# Patient Record
Sex: FEMALE | Race: WHITE | Employment: OTHER | URBAN - METROPOLITAN AREA
[De-identification: names, ages, dates, MRNs, and addresses within clinical notes are randomized per-mention and may not be internally consistent; named-entity substitution may affect disease eponyms.]

---
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
## 2017-05-30 PROBLEM — M25.562 LEFT KNEE PAIN: Status: ACTIVE | Noted: 2017-05-30

## 2017-05-30 PROBLEM — R26.89 LOSS OF BALANCE: Status: ACTIVE | Noted: 2017-05-30

## 2017-05-30 PROBLEM — Z98.1 S/P LAMINECTOMY WITH SPINAL FUSION: Status: ACTIVE | Noted: 2017-05-30

## 2017-08-31 ENCOUNTER — HOSPITAL ENCOUNTER (OUTPATIENT)
Dept: PHYSICAL THERAPY | Age: 77
Discharge: HOME | End: 2017-08-31
Payer: MEDICARE

## 2017-08-31 PROCEDURE — G8984 CARRY CURRENT STATUS: HCPCS | Performed by: PHYSICAL THERAPIST

## 2017-08-31 PROCEDURE — 97110 THERAPEUTIC EXERCISES: CPT | Performed by: PHYSICAL THERAPIST

## 2017-08-31 PROCEDURE — G8985 CARRY GOAL STATUS: HCPCS | Performed by: PHYSICAL THERAPIST

## 2017-08-31 PROCEDURE — 97161 PT EVAL LOW COMPLEX 20 MIN: CPT | Performed by: PHYSICAL THERAPIST

## 2017-08-31 NOTE — PROGRESS NOTES
Danette Barnes [place]  : 1940   [address]. [facility] [address], [facility], [address]  [phone]   [phone]         OUTPATIENT PHYSICAL THERAPY:Initial Assessment 2017    ICD-10: Treatment Diagnosis: (M54.2) Cervicalgia; (M62.81) Muscle weakness; (Z98.1) Arthrodesis L-spine,   Precautions/Allergies:   Review of patient's allergies indicates no known allergies. Fall Risk Score: 1 (? 5 = High Risk)  MD Orders: Evaluate and Treat current cervicalgia and core strengthening s/p arthrodesis MEDICAL/REFERRING DIAGNOSIS:  Arthrodesis status [Z98.1]  Cervicalgia [M54.2]   DATE OF ONSET: 2016  REFERRING PHYSICIAN: Deidre العلي MD  RETURN PHYSICIAN APPOINTMENT: None Scheduled     INITIAL ASSESSMENT:  Ms. Gustavo Fatima presents today with neck pain that she's had off and on for several years due to poor posture from chest/pec tightening s/p mastectomy and reconstruction, as well as, clavicular fracture and generalized OA/DDD. She has difficulty reaching/lifting overhead due to decreased cervical/thoracic mobility, neck pain and poor posture. Pt is anxious to begin therapy and learn exercises to improve her posture and decrease her pain. Pt reports she no longer has LBP since her surgery in , however, she would like to learn exercises that will help improve her strength and safety with performing house hold and yard work activities. Pt will benefit from skilled PT to address the following deficits. PROBLEM LIST (Impacting functional limitations):  1. Decreased Strength  2. Increased Pain  3. Decreased Pacing Skills  4. Decreased Flexibility/Joint Mobility  5. Decreased Knowledge of Precautions  6. Decreased [place] with Home Exercise Program INTERVENTIONS PLANNED:  1. Balance Exercise  2. Bed Mobility  3. Cold  4. Cryotherapy  5. Electrical Stimulation  6. Heat  7. Home Exercise Program (HEP)  8.  Manual Therapy  9. Neuromuscular Re-education/Strengthening  10. Range of Motion (ROM)  11. Therapeutic Exercise/Strengthening  12. Transcutaneous Electrical Nerve Stimulation (TENS)  13. Ultrasound (US)  14. Kinesiotaping   TREATMENT PLAN:  Effective Dates: 8/31/17 TO 11/23/17. Frequency/Duration: 2 times a week for 12 weeks  GOALS: (Goals have been discussed and agreed upon with patient.)  Short-Term Functional Goals: Time Frame: 4-6 weeks (10/12/17)  1. Pt will be compliant and independent with HEP and demonstrating correct, erect posture and log rolling without an increase in pain. 2. Pt will improve NDI score to 3/50 to increase pt's overall functional mobility. 3. Pt will improve B shoulder flex/scapt/ABD AROM to 145deg with pain 3-4/10 @ worst to increase pt's ease with reaching/lifting overhead. 4. Pt to subjectively report a decrease in pain to 3-4/10 @ worst to increase pt's ease with driving. 5. Pt will improve Cervical AROM to Flex 75%, B SB; 25%; B ROT 25% and Ext: 50% with pain 3-4/10 @ worst to increase pt's ease and safety with driving. 6. Pt will subjectively report less pain and greater ease with driving, washing dishes, doing yard work and taking care of her animals. 7. Pt will improve cervical strength to 3/5 throughout to aide with pt having correct erect posture with less pain. 8. Pt will improve core and hip strength to > 3/5 throughout to prevent LBP and improve balance. Discharge Goals: Time Frame: 8-12 weeks (11/23/17)  1. Pt will improve NDI score to 2/50 to increase pt's overall functional mobility. 2. Pt will improve FLR from  CI to G 8985 CI to increase pt's overall functional mobility. 3. Pt will improve cervical AROM to Flex 80%, B SB 50%, B ROT 50% and Ext 75% with manageable pain to increase pt's ease with driving and yard work. 9. Pt will improve B shoulder flex/scapt/ABD AROM to 155deg with manageable pain to allow pt greater ease with [place] activities.    4. Pt will subjectively report a decrease in pain to 2-3/10 @ worst to allow pt greater ease with reaching overhead. 5. Pt will no longer need to wrap a towel around her neck for support while performing yard work, cooking, cleaning and washing dishes. 6. Pt will improve core and hip strength to 3+/5 throughout to increase pt's stability, balance and ease of performing yard work and caring for her animals. 7. Pt will improve cervical strength to 3+/5 throughout to further decrease pt's pain while improving posture. 8. Pt will be discharged from PT to [facility]. Rehabilitation Potential For Stated Goals: Good  Regarding Katiana Michael Ponce's therapy, I certify that the treatment plan above will be carried out by a therapist or under their direction. Thank you for this referral,  Adiel Henriquez PT     Referring Physician Signature: Juni James MD              Date                    The information in this section was collected on 8/31/17 (except where otherwise noted). HISTORY:   History of Present Injury/Illness (Reason for Referral):  Pt reports she's had neck and back pain for years, and she had a lumbar laminectomy /arthrodesis July 2016. She states she's trying to avoid neck surgery. She reports her neck pain/pressure worsens with all of her house/yard work, ADLs, driving, and caring for her animals. She denies taking pain meds and has not had an ANA. She states she uses a towel tied very tight around her neck to aide with unloading and supporting it. She denies having had PT after her lumbar surgery, and would like to learn exercises that will help improve her posture, neck pain and aide with preventing further injury to her back. Past Medical History/Comorbidities:   Ms. Daryle Coins  has a past medical history of Arrhythmia; Blindness of right eye; Cancer ([facility] [address] (1978); Cardiac murmur; Disc degeneration, lumbar; Fibrocystic disease of breast; GERD (gastroesophageal reflux disease); Hyperlipidemia;  Hypertension; Imbalance; Osteoarthritis; Osteoporosis; Sciatica; Spinal stenosis; Squamous cell carcinoma of scalp (2014); TIA (transient ischemic attack) (2008); and Vertigo. She also has no past medical history of Adverse effect of anesthesia; Difficult intubation; Malignant hyperthermia due to anesthesia; Nausea & vomiting; or Pseudocholinesterase deficiency. Ms. Eric Kimble  has a past surgical history that includes hysterectomy (1969); other surgical (2015); knee replacement (Left, 2007); orthopaedic (Right, 2008); knee replacement (Right, 2009); orthopaedic (2016); mastectomy (Bilateral, 1978); and cataract removal (Bilateral, 2009). Social History/Living Environment:     Pt reports she lives alone in a mobile home on .[place] acres. She states she does her own house/yard work and cares for her animals, but has neck pain. She states difficulty driving and reaching/lifting overhead due to neck and upper back pain. Prior Level of Function/Work/Activity:  Pt states she's completely independent with all activities and wants to keep it that way, but does have pain. Dominant Side:         LEFT    Current Medications:       Current Outpatient Prescriptions:     quinapril (ACCUPRIL) 10 mg tablet, TAKE 1 TAB BY MOUTH EVERY MORNING. INDICATIONS: HYPERTENSION, Disp: 90 Tab, Rfl: 3    metoprolol tartrate (LOPRESSOR) 50 mg tablet, Take 1 Tab by mouth two (2) times a day., Disp: 180 Tab, Rfl: 3    lovastatin (MEVACOR) 20 mg tablet, Take 20 mg by mouth nightly., Disp: , Rfl:     aspirin (ASPIRIN) 325 mg tablet, Take 325 mg by mouth every morning. Stopped 7/8/16, Disp: , Rfl:    Date Last Reviewed:  8/31/17    Number of Personal Factors/Comorbidities that affect the Plan of Care:  0: LOW COMPLEXITY   EXAMINATION:   Observation/Orthostatic Postural Assessment:          Pt sits/stands with very forward head and rounded shoulders and posterior pelvic tilt.    Palpation:          Pt with increased tightness/tenderness noted in B cervical paraspinals, B UT, B levator scap, B anterior scalenes, B SCM and B pec major/minor contributing to her thoracic and cervical pain and lack of mobility. Trigger points: + in B UT (post metcalf), B levator scap  Flexibility: moderate to severe tightness noted in B cervical paraspinals, B UT, B levator scap, B anterior scalenes, B SCM and B pec major/minor. Cervical AROM: % of normal motion in standing  Cervical spine Date:  8/31/17 Date:   Date:     Direction  Parameters Parameters Parameters   Flexion  50% w/ pain     Extension  25% w/ pain     Rotation  R: 10% w/ pain  L: 20% w/ pain     Side bending  R: 10% w/ pain  L:20% w/ pain     Shoulder Flex/Scapt R:135deg  L:135deg     Shoulder ABD R:135deg  L:135deg     Shoulder ER/IR R:WNL B  L:WNL B         Strength Date:  8/31/17 Date:   Date:     Cervical/Shoulder Parameters Parameters Parameters   Cervical 2+ to 3-/5 due to poor posture     Shoulder Flex/Scapt R:3+/5  L:3+/5     Shoulder ABD R:3/5  L:3/5        Myotomes: Normal and equal B throughout  Diaphragm (C4):  Deltoid/Biceps (C5):  Wrist Extensors (C6):  Triceps (C7):  Flexor Profundus (C8):    Sensation: Normal and equal B throughout  Biceps (C5):   Carbone Radial (C6-C7):  Carbone Ulner (C8-T1):    Special Test/Function:  Compression:+ w/ pain at C5-C6  Distraction:+ w/ decreased pain/heaviness throughout her c-spine  Spurling's maneuver:-B  Accessory mobility:restricted throughout, especially C4-C7      Body Structures Involved:  1. Nerves  2. Bones  3. Joints  4. Muscles  5. Ligaments Body Functions Affected:  1. Sensory/Pain  2. Neuromusculoskeletal  3. Movement Related  Activities and Participation Affected:  1. Mobility  2. Self Care  3. Domestic Life  4.  Community, Social and [place]    Number of elements (examined above) that affect the Plan of Care:  1-2: LOW COMPLEXITY   CLINICAL PRESENTATION:    Presentation:  Stable and uncomplicated: LOW COMPLEXITY   CLINICAL DECISION MAKING:   Outcome Measure: Tool Used: Neck Disability Index (NDI)  Score:  Initial: 4/50 =8% disability Most Recent: X/50 (Date: -- )   Interpretation of Score: The Neck Disability Index is a revised form of the Oswestry Low Back Pain Index and is designed to measure the activities of daily living in person's with neck pain. Each section is scored on a 0-5 scale, 5 representing the greatest disability. The scores of each section are added together for a total score of 50. Score 0 1-10 11-20 21-30 31-40 41-49 50   Modifier CH CI CJ CK CL CM CN     ? Carrying, Moving, and Handling Objects:     - CURRENT STATUS: CI - 1%-19% impaired, limited or restricted    - GOAL STATUS: CI - 1%-19% impaired, limited or restricted    - D/C STATUS:  ---------------To be determined---------------       Medical Necessity:   · Patient is expected to demonstrate progress in strength, range of motion, balance, coordination and functional technique to improve safety during driving and decrease pain, as well as, increase ease with lifting/reaching overhead to increase pt's ease with house work, yard work and caring for her animals. .  Reason for Services/Other Comments:  · Patient reports she lives alone in a bad neighborhood. She reports she's completely independent with all of her ADLS, house hold and work activities. She states she has pain and difficulty completing them. She reports she currently ties a towel around her neck for support, and it helps, however she appears weak to her rough neighbors. She states she needs assistance in \"getting rid of\" her neck pain, as well as, education on how to get stronger and improve her posture so that she doesn't appear vulnerable.  .     Use of outcome tool(s) and clinical judgement create a POC that gives a: Clear prediction of patient's progress: LOW COMPLEXITY            TREATMENT:   (In addition to Assessment/Re-Assessment sessions the following treatments were rendered)  Pre-treatment Symptoms/Complaints:  Pt c/o neck pain/heaviness. Pain: Initial:     5-6/10 Post Session:  4-5/10 after traction/exercises     THERAPEUTIC EXERCISE: (30minutes minutes):  Exercises per grid below to improve mobility, strength, balance and coordination. Required moderate visual, verbal and tactile cues to promote proper body alignment, promote proper body posture, promote proper body mechanics and promote proper body breathing techniques. Progressed resistance, range, repetitions and complexity of movement as indicated. Re-assessment was performed today (see above assessment section). Separate time was dedicated today for this re-assessment. 30minutes   Date:  8/31/17 Date:   Date:     Activity/Exercise Parameters Parameters Parameters   Shoulder shrugs 10x     Posterior shoulder rolls 10x     Scapular retraction 10x     Chin tucks (sitting/supine) 10 x 5sec     Cervical rotation to the left 5x     Cervical flexion 5x     Seated UT stretch 2 x 20sec     Stdg drwy pec stretch 1 x 20sec     LTR 10x     TAs w/ pelvic tilt 10x 5sec     Cervical Traction/SOC release 5\"     Education HEP, scapular retraction/depression/TAs       Treatment/Session Assessment:  See above  · Response to Treatment:  Pt did well with above exercises, therefore Iadded those to her HEP. She subjectively reported less pain and minimally improved movement after manual cervical traction and SOC release  · Compliance with Program/Exercises: Will assess as treatment progresses. Recommendations/Intent for next treatment session: \"Next visit will focus on advancements to more challenging activities, reduction in assistance provided and manual therapy to address soft tissue, as well as, joint restrictions   \". Future Appointments  Date Time Provider Sunny Burden   9/11/2017 2:00 PM Chucho Acosta PT [facility]   9/13/2017 2:00 PM KEAGAN Tompkins [facility]   9/18/2017 2:00 PM Chucho Acosta PT [facility] 9/21/2017 2:00 PM Ruthine Shires, PT [facility]   9/26/2017 2:00 PM Ruthine Shires, PT [facility]   9/28/2017 2:00 PM Ruthine Shires, PT [facility]   10/4/2017 2:00 PM Ruthine Shires, PT [facility]   10/5/2017 10:45 AM Effie Villegas [facility]   10/9/2017 2:00 PM Ruthine Shires, PT [facility]   10/11/2017 2:00 PM Ruthine Shires, PT [facility]   10/18/2017 2:00 PM Ruthine Shires, PT [facility]   10/23/2017 2:00 PM Ruthine Shires, PT [facility]   10/25/2017 2:00 PM Ruthine Shires, PT [facility]     Total Treatment Duration:  PT Patient Time In/Time Out  Time In: 1325  Time Out: 1435    Ruthine Shires, PT

## 2017-08-31 NOTE — PROGRESS NOTES
Ambulatory/Rehab Services H2 Model Falls Risk Assessment    Risk Factor Pts. ·   Confusion/Disorientation/Impulsivity  []    4 ·   Symptomatic Depression  []   2 ·   Altered Elimination  []   1 ·   Dizziness/Vertigo  [x]   1 ·   Gender (Male)  []   1 ·   Any administered antiepileptics (anticonvulsants):  []   2 ·   Any administered benzodiazepines:  []   1 ·   Visual Impairment (specify):  []   1 ·   Portable Oxygen Use  []   1 ·   Orthostatic ? BP  []   1 ·   History of Recent Falls (within 3 mos.)  []   5     Ability to Rise from Chair (choose one) Pts. ·   Ability to rise in a single movement  [x]   0 ·   Pushes up, successful in one attempt  []   1 ·   Multiple attempts, but successful  []   3 ·   Unable to rise without assistance  []   4   Total: (5 or greater = High Risk) 1     Falls Prevention Plan:   []                Physical Limitations to Exercise (specify):   []                Mobility Assistance Device (type):   []                Exercise/Equipment Adaptation (specify):    ©2010 [facility] Patent #[id number].  Federal Law prohibits the replication, distribution or use without written permission from [facility] [employer]

## 2017-09-11 ENCOUNTER — APPOINTMENT (OUTPATIENT)
Dept: PHYSICAL THERAPY | Age: 77
End: 2017-09-11
Payer: MEDICARE

## 2017-09-13 ENCOUNTER — HOSPITAL ENCOUNTER (OUTPATIENT)
Dept: PHYSICAL THERAPY | Age: 77
Discharge: HOME | End: 2017-09-13
Payer: MEDICARE

## 2017-09-13 PROCEDURE — 97110 THERAPEUTIC EXERCISES: CPT | Performed by: PHYSICAL THERAPIST

## 2017-09-13 PROCEDURE — 97035 APP MDLTY 1+ULTRASOUND EA 15: CPT | Performed by: PHYSICAL THERAPIST

## 2017-09-13 PROCEDURE — 97140 MANUAL THERAPY 1/> REGIONS: CPT | Performed by: PHYSICAL THERAPIST

## 2017-09-13 NOTE — PROGRESS NOTES
Hilario Oxford Ponce  : 1940   [address]. [address], [place], [address] [address]  [phone]   [phone]         OUTPATIENT PHYSICAL THERAPY:Daily Note 2017    ICD-10: Treatment Diagnosis: (M54.2) Cervicalgia; (M62.81) Muscle weakness; (Z98.1) Arthrodesis L-spine,   Precautions/Allergies:   Review of patient's allergies indicates no known allergies. Fall Risk Score: 1 (? 5 = High Risk)  MD Orders: Evaluate and Treat current cervicalgia and core strengthening s/p arthrodesis MEDICAL/REFERRING DIAGNOSIS:  Arthrodesis status [Z98.1]  Cervicalgia [M54.2]   DATE OF ONSET: 2016  REFERRING PHYSICIAN: Fabian Ballard MD  RETURN PHYSICIAN APPOINTMENT: None Scheduled     INITIAL ASSESSMENT:  Ms. Janis Luther presents today with neck pain that she's had off and on for several years due to poor posture from chest/pec tightening s/p mastectomy and reconstruction, as well as, clavicular fracture and generalized OA/DDD. She has difficulty reaching/lifting overhead due to decreased cervical/thoracic mobility, neck pain and poor posture. Pt is anxious to begin therapy and learn exercises to improve her posture and decrease her pain. Pt reports she no longer has LBP since her surgery in , however, she would like to learn exercises that will help improve her strength and safety with performing house hold and yard work activities. Pt will benefit from skilled PT to address the following deficits. PROBLEM LIST (Impacting functional limitations):  1. Decreased Strength  2. Increased Pain  3. Decreased Pacing Skills  4. Decreased Flexibility/Joint Mobility  5. Decreased Knowledge of Precautions  6. Decreased Morris with Home Exercise Program INTERVENTIONS PLANNED:  1. Balance Exercise  2. Bed Mobility  3. Cold  4. Cryotherapy  5. Electrical Stimulation  6. Heat  7. Home Exercise Program (HEP)  8. Manual Therapy  9.  Neuromuscular Re-education/Strengthening  10. Range of Motion (ROM)  11. Therapeutic Exercise/Strengthening  12. Transcutaneous Electrical Nerve Stimulation (TENS)  13. Ultrasound (US)  14. Kinesiotaping   TREATMENT PLAN:  Effective Dates: 8/31/17 TO 11/23/17. Frequency/Duration: 2 times a week for 12 weeks  GOALS: (Goals have been discussed and agreed upon with patient.)  Short-Term Functional Goals: Time Frame: 4-6 weeks (10/12/17)  1. Pt will be compliant and independent with HEP and demonstrating correct, erect posture and log rolling without an increase in pain. 2. Pt will improve NDI score to 3/50 to increase pt's overall functional mobility. 3. Pt will improve B shoulder flex/scapt/ABD AROM to 145deg with pain 3-4/10 @ worst to increase pt's ease with reaching/lifting overhead. 4. Pt to subjectively report a decrease in pain to 3-4/10 @ worst to increase pt's ease with driving. 5. Pt will improve Cervical AROM to Flex 75%, B SB; 25%; B ROT 25% and Ext: 50% with pain 3-4/10 @ worst to increase pt's ease and safety with driving. 6. Pt will subjectively report less pain and greater ease with driving, washing dishes, doing yard work and taking care of her animals. 7. Pt will improve cervical strength to 3/5 throughout to aide with pt having correct erect posture with less pain. 8. Pt will improve core and hip strength to > 3/5 throughout to prevent LBP and improve balance. Discharge Goals: Time Frame: 8-12 weeks (11/23/17)  1. Pt will improve NDI score to 2/50 to increase pt's overall functional mobility. 2. Pt will improve FLR from  CI to G 8985 CI to increase pt's overall functional mobility. 3. Pt will improve cervical AROM to Flex 80%, B SB 50%, B ROT 50% and Ext 75% with manageable pain to increase pt's ease with driving and yard work. 9. Pt will improve B shoulder flex/scapt/ABD AROM to 155deg with manageable pain to allow pt greater ease with [place] activities.    4. Pt will subjectively report a decrease in pain to 2-3/10 @ worst to allow pt greater ease with reaching overhead. 5. Pt will no longer need to wrap a towel around her neck for support while performing yard work, cooking, cleaning and washing dishes. 6. Pt will improve core and hip strength to 3+/5 throughout to increase pt's stability, balance and ease of performing yard work and caring for her animals. 7. Pt will improve cervical strength to 3+/5 throughout to further decrease pt's pain while improving posture. 8. Pt will be discharged from PT to [facility]. Rehabilitation Potential For Stated Goals: Good  Regarding [facility] [place]'s therapy, I certify that the treatment plan above will be carried out by a therapist or under their direction. Thank you for this referral,  Temo Monaco PT     Referring Physician Signature: Deidre العلي MD              Date                    The information in this section was collected on 8/31/17 (except where otherwise noted). HISTORY:   History of Present Injury/Illness (Reason for Referral):  Pt reports she's had neck and back pain for years, and she had a lumbar laminectomy /arthrodesis July 2016. She states she's trying to avoid neck surgery. She reports her neck pain/pressure worsens with all of her house/yard work, ADLs, driving, and caring for her animals. She denies taking pain meds and has not had an ANA. She states she uses a towel tied very tight around her neck to aide with unloading and supporting it. She denies having had PT after her lumbar surgery, and would like to learn exercises that will help improve her posture, neck pain and aide with preventing further injury to her back. Past Medical History/Comorbidities:   Ms. Gustavo Fatima  has a past medical history of Arrhythmia; Blindness of right eye; Cancer ([facility] [address] (1978); Cardiac murmur; Disc degeneration, lumbar; Fibrocystic disease of breast; GERD (gastroesophageal reflux disease); Hyperlipidemia;  Hypertension; Imbalance; Osteoarthritis; Osteoporosis; Sciatica; Spinal stenosis; Squamous cell carcinoma of scalp (2014); TIA (transient ischemic attack) (2008); and Vertigo. She also has no past medical history of Adverse effect of anesthesia; Difficult intubation; Malignant hyperthermia due to anesthesia; Nausea & vomiting; or Pseudocholinesterase deficiency. Ms. Charla Colby  has a past surgical history that includes hysterectomy (1969); other surgical (2015); knee replacement (Left, 2007); orthopaedic (Right, 2008); knee replacement (Right, 2009); orthopaedic (2016); mastectomy (Bilateral, 1978); and cataract removal (Bilateral, 2009). Social History/Living Environment:     Pt reports she lives alone in a mobile home on .[place] acres. She states she does her own house/yard work and cares for her animals, but has neck pain. She states difficulty driving and reaching/lifting overhead due to neck and upper back pain. Prior Level of Function/Work/Activity:  Pt states she's completely independent with all activities and wants to keep it that way, but does have pain. Dominant Side:         LEFT    Current Medications:       Current Outpatient Prescriptions:     quinapril (ACCUPRIL) 10 mg tablet, TAKE 1 TAB BY MOUTH EVERY MORNING. INDICATIONS: HYPERTENSION, Disp: 90 Tab, Rfl: 3    metoprolol tartrate (LOPRESSOR) 50 mg tablet, Take 1 Tab by mouth two (2) times a day., Disp: 180 Tab, Rfl: 3    lovastatin (MEVACOR) 20 mg tablet, Take 20 mg by mouth nightly., Disp: , Rfl:     aspirin (ASPIRIN) 325 mg tablet, Take 325 mg by mouth every morning. Stopped 7/8/16, Disp: , Rfl:    Date Last Reviewed:  8/31/17   EXAMINATION:   Observation/Orthostatic Postural Assessment:          Pt sits/stands with very forward head and rounded shoulders and posterior pelvic tilt.    Palpation:          Pt with increased tightness/tenderness noted in B cervical paraspinals, B UT, B levator scap, B anterior scalenes, B SCM and B pec major/minor contributing to her thoracic and cervical pain and lack of mobility. Trigger points: + in B UT (post metcalf), B levator scap  Flexibility: moderate to severe tightness noted in B cervical paraspinals, B UT, B levator scap, B anterior scalenes, B SCM and B pec major/minor. Cervical AROM: % of normal motion in standing  Cervical spine Date:  8/31/17 Date:   Date:     Direction  Parameters Parameters Parameters   Flexion  50% w/ pain     Extension  25% w/ pain     Rotation  R: 10% w/ pain  L: 20% w/ pain     Side bending  R: 10% w/ pain  L:20% w/ pain     Shoulder Flex/Scapt R:135deg  L:135deg     Shoulder ABD R:135deg  L:135deg     Shoulder ER/IR R:WNL B  L:WNL B         Strength Date:  8/31/17 Date:   Date:     Cervical/Shoulder Parameters Parameters Parameters   Cervical 2+ to 3-/5 due to poor posture     Shoulder Flex/Scapt R:3+/5  L:3+/5     Shoulder ABD R:3/5  L:3/5        Myotomes: Normal and equal B throughout  Diaphragm (C4):  Deltoid/Biceps (C5):  Wrist Extensors (C6):  Triceps (C7):  Flexor Profundus (C8):    Sensation: Normal and equal B throughout  Biceps (C5):   Carbone Radial (C6-C7):  Carbone Ulner (C8-T1):    Special Test/Function:  Compression:+ w/ pain at C5-C6  Distraction:+ w/ decreased pain/heaviness throughout her c-spine  Spurling's maneuver:-B  Accessory mobility:restricted throughout, especially C4-C7     CLINICAL PRESENTATION:   CLINICAL DECISION MAKING:   Outcome Measure: Tool Used: Neck Disability Index (NDI)  Score:  Initial: 4/50 =8% disability Most Recent: X/50 (Date: -- )   Interpretation of Score: The Neck Disability Index is a revised form of the Oswestry Low Back Pain Index and is designed to measure the activities of daily living in person's with neck pain. Each section is scored on a 0-5 scale, 5 representing the greatest disability. The scores of each section are added together for a total score of 50. Score 0 1-10 11-20 21-30 31-40 41-49 50   Modifier CH CI CJ CK CL CM CN     ?  Carrying, Moving, and Handling Objects:     - CURRENT STATUS: CI - 1%-19% impaired, limited or restricted    - GOAL STATUS: CI - 1%-19% impaired, limited or restricted    - D/C STATUS:  ---------------To be determined---------------       Medical Necessity:   · Patient is expected to demonstrate progress in strength, range of motion, balance, coordination and functional technique to improve safety during driving and decrease pain, as well as, increase ease with lifting/reaching overhead to increase pt's ease with house work, yard work and caring for her animals. .  Reason for Services/Other Comments:  · Patient reports she lives alone in a bad neighborhood. She reports she's completely independent with all of her ADLS, house hold and work activities. She states she has pain and difficulty completing them. She reports she currently ties a towel around her neck for support, and it helps, however she appears weak to her rough neighbors. She states she needs assistance in \"getting rid of\" her neck pain, as well as, education on how to get stronger and improve her posture so that she doesn't appear vulnerable. .            TREATMENT:   (In addition to Assessment/Re-Assessment sessions the following treatments were rendered)  Pre-treatment Symptoms/Complaints:  Pt reports she still has neck pain and stiffness, but states she's had less incidence of headaches since beginning her exercises. Pain: Initial:     5/10 Post Session:  3/10 after traction/exercises     US: (10minutes): @ 1MHz, 100% . 9w/cm2 to B cervical paraspinals, B UT and B levator scap to aide with decreasing pain and tightness, followed by    MANUAL THERAPY: (30minutes): Consisting of STM/MFR to B cervical paraspinals, B UT, B levator scap and B anterior scalenes to aide with improving tissue mobility while decreasing pain. Pt also received manual cervical traction, as well as, SOC release to aide with improving joint mobility.      THERAPEUTIC EXERCISE: (20 minutes minutes):  Exercises per grid below to improve mobility, strength, balance and coordination. Required moderate visual, verbal and tactile cues to promote proper body alignment, promote proper body posture, promote proper body mechanics and promote proper body breathing techniques. Progressed resistance, range, repetitions and complexity of movement as indicated. Date:  8/31/17 Date:  9/13/17 Date:     Activity/Exercise Parameters Parameters Parameters   Shoulder shrugs 10x     Posterior shoulder rolls 10x 10x    Scapular retraction 10x 10x    Chin tucks (sitting/supine) 10 x 5sec 20 x 5 sec    Cervical rotation to the left 5x     Cervical flexion 5x     Seated UT stretch 2 x 20sec     Stdg drwy pec stretch 1 x 20sec 2 x 20sec    LTR 10x 20x    TAs w/ pelvic tilt 10x 5sec 20 x 5sec    Bridging w/ TAs, pelvic tilt and add sq  20x    Supine hip ABD w/ tband  OTB, 20x          STM/MFR to C-spine/UT  done    Cervical Traction/SOC release 5\" done    Education HEP, scapular retraction/depression/TAs TAs/glute sq w/ all standing/lifting; proper lifting techniques      Treatment/Session Assessment:  Pt with improved cervical AROM after manual therapy. Increased tightness, tissue density and positive trigger points noted in R UT and levator scap. Pt encouraged to stretch pecs daily as I believe pec tigthness is contributing to her forward head and shoulders which is increasing her pain. · Response to Treatment:  Pt with less cervical pain and her low grade headache subsided with manual cerivcal traction and SOC. · Compliance with Program/Exercises: Pt reports compliance with HEP 2-3x daily as long as she hasn't done a lot of house work, otherwise 1x daily. Recommendations/Intent for next treatment session: \"Continue with PT to improve cervical and B UE AROM and strength, as well as, core strength to decrease cervical and lumbar pain.  Manual techniques and modalities should be utilized if pt continues to respond well to them. Progress mid back and abdominal strengthening as pt is able. \".   Future Appointments  Date Time Provider Sunny Burden   9/18/2017 2:00 PM Francesco Martinez, PT [facility]   9/21/2017 2:00 PM Francesco Martinez, PT [facility]   9/26/2017 2:00 PM Francesco Martinez, PT [facility]   9/28/2017 2:00 PM Francesco Martinez, PT [facility]   10/4/2017 2:00 PM Francesco Martinez, PT [facility]   10/5/2017 10:45 AM Ronda Lehman MD [facility]   10/9/2017 2:00 PM Francesco Martinez, PT [facility]   10/11/2017 2:00 PM Francesco Martinez, PT [facility]   10/18/2017 2:00 PM Francesco Martinez, PT [facility]   10/23/2017 2:00 PM Francesco Martinez, PT [facility]   10/25/2017 2:00 PM Francesco Martinez, PT [facility]     Total Treatment Duration:  PT Patient Time In/Time Out  Time In: 1400  Time Out: Shazia [address], PT

## 2017-09-18 ENCOUNTER — HOSPITAL ENCOUNTER (OUTPATIENT)
Dept: PHYSICAL THERAPY | Age: 77
Discharge: HOME | End: 2017-09-18
Payer: MEDICARE

## 2017-09-18 PROCEDURE — 97140 MANUAL THERAPY 1/> REGIONS: CPT | Performed by: PHYSICAL THERAPIST

## 2017-09-18 PROCEDURE — 97110 THERAPEUTIC EXERCISES: CPT | Performed by: PHYSICAL THERAPIST

## 2017-09-18 PROCEDURE — 97035 APP MDLTY 1+ULTRASOUND EA 15: CPT | Performed by: PHYSICAL THERAPIST

## 2017-09-18 NOTE — PROGRESS NOTES
[place]  : 1940   Go[facility] [address] at [facility]. [address], [place], [address] [address]  [phone]   [phone]         OUTPATIENT PHYSICAL THERAPY:Daily Note 2017    ICD-10: Treatment Diagnosis: (M54.2) Cervicalgia; (M62.81) Muscle weakness; (Z98.1) Arthrodesis L-spine,   Precautions/Allergies:   Review of patient's allergies indicates no known allergies. Fall Risk Score: 1 (? 5 = High Risk)  MD Orders: Evaluate and Treat current cervicalgia and core strengthening s/p arthrodesis MEDICAL/REFERRING DIAGNOSIS:  Arthrodesis status [Z98.1]  Cervicalgia [M54.2]   DATE OF ONSET: 2016  REFERRING PHYSICIAN: Eleonora Garcia MD  RETURN PHYSICIAN APPOINTMENT: None Scheduled     INITIAL ASSESSMENT:  Ms. Talita Cotto presents today with neck pain that she's had off and on for several years due to poor posture from chest/pec tightening s/p mastectomy and reconstruction, as well as, clavicular fracture and generalized OA/DDD. She has difficulty reaching/lifting overhead due to decreased cervical/thoracic mobility, neck pain and poor posture. Pt is anxious to begin therapy and learn exercises to improve her posture and decrease her pain. Pt reports she no longer has LBP since her surgery in , however, she would like to learn exercises that will help improve her strength and safety with performing house hold and yard work activities. Pt will benefit from skilled PT to address the following deficits. PROBLEM LIST (Impacting functional limitations):  1. Decreased Strength  2. Increased Pain  3. Decreased Pacing Skills  4. Decreased Flexibility/Joint Mobility  5. Decreased Knowledge of Precautions  6. Decreased [place] with Home Exercise Program INTERVENTIONS PLANNED:  1. Balance Exercise  2. Bed Mobility  3. Cold  4. Cryotherapy  5. Electrical Stimulation  6. Heat  7. Home Exercise Program (HEP)  8. Manual Therapy  9.  Neuromuscular Re-education/Strengthening  10. Range of Motion (ROM)  11. Therapeutic Exercise/Strengthening  12. Transcutaneous Electrical Nerve Stimulation (TENS)  13. Ultrasound (US)  14. Kinesiotaping   TREATMENT PLAN:  Effective Dates: 8/31/17 TO 11/23/17. Frequency/Duration: 2 times a week for 12 weeks  GOALS: (Goals have been discussed and agreed upon with patient.)  Short-Term Functional Goals: Time Frame: 4-6 weeks (10/12/17)  1. Pt will be compliant and independent with HEP and demonstrating correct, erect posture and log rolling without an increase in pain. 2. Pt will improve NDI score to 3/50 to increase pt's overall functional mobility. 3. Pt will improve B shoulder flex/scapt/ABD AROM to 145deg with pain 3-4/10 @ worst to increase pt's ease with reaching/lifting overhead. 4. Pt to subjectively report a decrease in pain to 3-4/10 @ worst to increase pt's ease with driving. 5. Pt will improve Cervical AROM to Flex 75%, B SB; 25%; B ROT 25% and Ext: 50% with pain 3-4/10 @ worst to increase pt's ease and safety with driving. 6. Pt will subjectively report less pain and greater ease with driving, washing dishes, doing yard work and taking care of her animals. 7. Pt will improve cervical strength to 3/5 throughout to aide with pt having correct erect posture with less pain. 8. Pt will improve core and hip strength to > 3/5 throughout to prevent LBP and improve balance. Discharge Goals: Time Frame: 8-12 weeks (11/23/17)  1. Pt will improve NDI score to 2/50 to increase pt's overall functional mobility. 2. Pt will improve FLR from  CI to G 8985 CI to increase pt's overall functional mobility. 3. Pt will improve cervical AROM to Flex 80%, B SB 50%, B ROT 50% and Ext 75% with manageable pain to increase pt's ease with driving and yard work. 9. Pt will improve B shoulder flex/scapt/ABD AROM to 155deg with manageable pain to allow pt greater ease with [place] activities.    4. Pt will subjectively report a decrease in pain to 2-3/10 @ worst to allow pt greater ease with reaching overhead. 5. Pt will no longer need to wrap a towel around her neck for support while performing yard work, cooking, cleaning and washing dishes. 6. Pt will improve core and hip strength to 3+/5 throughout to increase pt's stability, balance and ease of performing yard work and caring for her animals. 7. Pt will improve cervical strength to 3+/5 throughout to further decrease pt's pain while improving posture. 8. Pt will be discharged from PT to [facility]. Rehabilitation Potential For Stated Goals: Good  Regarding [facility]'s therapy, I certify that the treatment plan above will be carried out by a therapist or under their direction. Thank you for this referral,  Nora Henderson, PT     Referring Physician Signature: Laird Litten, MD              Date                    The information in this section was collected on 8/31/17 (except where otherwise noted). HISTORY:   History of Present Injury/Illness (Reason for Referral):  Pt reports she's had neck and back pain for years, and she had a lumbar laminectomy /arthrodesis July 2016. She states she's trying to avoid neck surgery. She reports her neck pain/pressure worsens with all of her house/yard work, ADLs, driving, and caring for her animals. She denies taking pain meds and has not had an ANA. She states she uses a towel tied very tight around her neck to aide with unloading and supporting it. She denies having had PT after her lumbar surgery, and would like to learn exercises that will help improve her posture, neck pain and aide with preventing further injury to her back. Past Medical History/Comorbidities:   Ms. Calin Webster  has a past medical history of Arrhythmia; Blindness of right eye; Cancer ([facility] [address] (1978); Cardiac murmur; Disc degeneration, lumbar; Fibrocystic disease of breast; GERD (gastroesophageal reflux disease); Hyperlipidemia;  Hypertension; Imbalance; Osteoarthritis; Osteoporosis; Sciatica; Spinal stenosis; Squamous cell carcinoma of scalp (2014); TIA (transient ischemic attack) (2008); and Vertigo. She also has no past medical history of Adverse effect of anesthesia; Difficult intubation; Malignant hyperthermia due to anesthesia; Nausea & vomiting; or Pseudocholinesterase deficiency. Ms. Talita Cotto  has a past surgical history that includes hysterectomy (1969); other surgical (2015); knee replacement (Left, 2007); orthopaedic (Right, 2008); knee replacement (Right, 2009); orthopaedic (2016); mastectomy (Bilateral, 1978); and cataract removal (Bilateral, 2009). Social History/Living Environment:     Pt reports she lives alone in a mobile home on .[place] acres. She states she does her own house/yard work and cares for her animals, but has neck pain. She states difficulty driving and reaching/lifting overhead due to neck and upper back pain. Prior Level of Function/Work/Activity:  Pt states she's completely independent with all activities and wants to keep it that way, but does have pain. Dominant Side:         LEFT    Current Medications:       Current Outpatient Prescriptions:     quinapril (ACCUPRIL) 10 mg tablet, TAKE 1 TAB BY MOUTH EVERY MORNING. INDICATIONS: HYPERTENSION, Disp: 90 Tab, Rfl: 3    metoprolol tartrate (LOPRESSOR) 50 mg tablet, Take 1 Tab by mouth two (2) times a day., Disp: 180 Tab, Rfl: 3    lovastatin (MEVACOR) 20 mg tablet, Take 20 mg by mouth nightly., Disp: , Rfl:     aspirin (ASPIRIN) 325 mg tablet, Take 325 mg by mouth every morning. Stopped 7/8/16, Disp: , Rfl:    Date Last Reviewed:  8/31/17   EXAMINATION:   Observation/Orthostatic Postural Assessment:          Pt sits/stands with very forward head and rounded shoulders and posterior pelvic tilt.    Palpation:          Pt with increased tightness/tenderness noted in B cervical paraspinals, B UT, B levator scap, B anterior scalenes, B SCM and B pec major/minor contributing to her thoracic and cervical pain and lack of mobility. Trigger points: + in B UT (post metcalf), B levator scap  Flexibility: moderate to severe tightness noted in B cervical paraspinals, B UT, B levator scap, B anterior scalenes, B SCM and B pec major/minor. Cervical AROM: % of normal motion in standing  Cervical spine Date:  8/31/17 Date:   Date:     Direction  Parameters Parameters Parameters   Flexion  50% w/ pain     Extension  25% w/ pain     Rotation  R: 10% w/ pain  L: 20% w/ pain     Side bending  R: 10% w/ pain  L:20% w/ pain     Shoulder Flex/Scapt R:135deg  L:135deg     Shoulder ABD R:135deg  L:135deg     Shoulder ER/IR R:WNL B  L:WNL B         Strength Date:  8/31/17 Date:   Date:     Cervical/Shoulder Parameters Parameters Parameters   Cervical 2+ to 3-/5 due to poor posture     Shoulder Flex/Scapt R:3+/5  L:3+/5     Shoulder ABD R:3/5  L:3/5        Myotomes: Normal and equal B throughout  Diaphragm (C4):  Deltoid/Biceps (C5):  Wrist Extensors (C6):  Triceps (C7):  Flexor Profundus (C8):    Sensation: Normal and equal B throughout  Biceps (C5):   Carbone Radial (C6-C7):  Carbone Ulner (C8-T1):    Special Test/Function:  Compression:+ w/ pain at C5-C6  Distraction:+ w/ decreased pain/heaviness throughout her c-spine  Spurling's maneuver:-B  Accessory mobility:restricted throughout, especially C4-C7     CLINICAL PRESENTATION:   CLINICAL DECISION MAKING:   Outcome Measure: Tool Used: Neck Disability Index (NDI)  Score:  Initial: 4/50 =8% disability Most Recent: X/50 (Date: -- )   Interpretation of Score: The Neck Disability Index is a revised form of the Oswestry Low Back Pain Index and is designed to measure the activities of daily living in person's with neck pain. Each section is scored on a 0-5 scale, 5 representing the greatest disability. The scores of each section are added together for a total score of 50. Score 0 1-10 11-20 21-30 31-40 41-49 50   Modifier CH CI CJ CK CL CM CN     ?  Carrying, Moving, and Handling Objects:     - CURRENT STATUS: CI - 1%-19% impaired, limited or restricted    - GOAL STATUS: CI - 1%-19% impaired, limited or restricted    - D/C STATUS:  ---------------To be determined---------------       Medical Necessity:   · Patient is expected to demonstrate progress in strength, range of motion, balance, coordination and functional technique to improve safety during driving and decrease pain, as well as, increase ease with lifting/reaching overhead to increase pt's ease with house work, yard work and caring for her animals. .  Reason for Services/Other Comments:  · Patient reports she lives alone in a bad neighborhood. She reports she's completely independent with all of her ADLS, house hold and work activities. She states she has pain and difficulty completing them. She reports she currently ties a towel around her neck for support, and it helps, however she appears weak to her rough neighbors. She states she needs assistance in \"getting rid of\" her neck pain, as well as, education on how to get stronger and improve her posture so that she doesn't appear vulnerable. .            TREATMENT:   (In addition to Assessment/Re-Assessment sessions the following treatments were rendered)  Pre-treatment Symptoms/Complaints:  Pt reports her neck pain and stiffness continues, especially today after having to care for 8 puppies over the weekend. She admits that she did notice a significant decrease in neck pain and tightness for several days after her last visit. Pain: Initial:     3-4/10 Post Session:  2/10 after traction/exercises     US: (10minutes): @ 1MHz, 100% . 9w/cm2 to B cervical paraspinals, B UT and B levator scap to aide with decreasing pain and tightness, followed by    MANUAL THERAPY: (30minutes): Consisting of STM/MFR to B cervical paraspinals, B UT, B levator scap and B anterior scalenes to aide with improving tissue mobility while decreasing pain.  Pt also received manual cervical traction, as well as, SOC release to aide with improving joint mobility. THERAPEUTIC EXERCISE: (15 minutes minutes):  Exercises per grid below to improve mobility, strength, balance and coordination. Required moderate visual, verbal and tactile cues to promote proper body alignment, promote proper body posture, promote proper body mechanics and promote proper body breathing techniques. Progressed resistance, range, repetitions and complexity of movement as indicated. Date:  8/31/17 Date:  9/13/17 Date:  9/18/17   Activity/Exercise Parameters Parameters Parameters   Shoulder shrugs 10x     Posterior shoulder rolls 10x 10x    Scapular retraction 10x 10x    Chin tucks (sitting/supine) 10 x 5sec 20 x 5 sec    Cervical rotation to the left 5x     Cervical flexion 5x     Seated UT stretch 2 x 20sec     Stdg drwy pec stretch 1 x 20sec 2 x 20sec    LTR 10x 20x    TAs w/ pelvic tilt 10x 5sec 20 x 5sec    Bridging w/ TAs, pelvic tilt and add sq  20x    Supine hip ABD w/ tband  OTB, 20x    Stdg B shoulder extension w/tband   YTB, 20x   Stdg B rowing w/ tband   YTB, 20x   Stdg B horizontal ABD w/tbans   YTB, 20x         STM/MFR to C-spine/UT  done done   Cervical Traction/SOC release 5\" done done   Education HEP, scapular retraction/depression/TAs TAs/glute sq w/ all standing/lifting; proper lifting techniques HEP for proper posture with scap retraction and depression     Treatment/Session Assessment:  Increased tightness and tissue density remain along B cervical paraspinals, B UT, B levator scap and B anterior scalenes today. She was tender along R side that improved with increased STM/MFR. She required verbal, visual and tactile cueing to avoid shoulder elevation. · Response to Treatment:  Pt with mild cervical pain with manual traction that decreased after SOC. She fatigued quickly with postural exercises.    · Compliance with Program/Exercises: Pt reports noncompliance with HEP since her last visit due to being so busy with the 8 puppies. Recommendations/Intent for next treatment session: \"Continue with PT to improve cervical and B UE AROM and strength, as well as, core strength to decrease cervical and lumbar pain. Manual techniques and modalities should be utilized if pt continues to respond well to them. Progress mid back and abdominal strengthening as pt is able. \".   Future Appointments  Date Time Provider Sunny Burden   9/21/2017 2:00 PM Denise Morris PT [facility]   9/26/2017 2:00 PM Denise Morris PT [facility]   9/28/2017 2:00 PM Denise Morris PT [facility]   10/4/2017 2:00 PM Denise Morris, PT [facility]   10/5/2017 10:45 AM Bebeto Taveras MD [facility]   10/9/2017 2:00 PM Denise Morris PT [facility]   10/11/2017 2:00 PM Denise Morris PT [facility]   10/18/2017 2:00 PM Denise Morris, PT [facility]   10/23/2017 2:00 PM Denise Morris, PT [facility]   10/25/2017 2:00 PM Denise Morris, PT [facility]     Total Treatment Duration:  PT Patient Time In/Time Out  Time In: 1400  Time Out: [address], PT

## 2017-09-21 ENCOUNTER — HOSPITAL ENCOUNTER (OUTPATIENT)
Dept: PHYSICAL THERAPY | Age: 77
Discharge: HOME | End: 2017-09-21
Payer: MEDICARE

## 2017-09-21 PROCEDURE — 97035 APP MDLTY 1+ULTRASOUND EA 15: CPT | Performed by: PHYSICAL THERAPIST

## 2017-09-21 PROCEDURE — 97110 THERAPEUTIC EXERCISES: CPT | Performed by: PHYSICAL THERAPIST

## 2017-09-21 PROCEDURE — 97140 MANUAL THERAPY 1/> REGIONS: CPT | Performed by: PHYSICAL THERAPIST

## 2017-09-21 NOTE — PROGRESS NOTES
Luis Morales Ponce  : 1940   [address]. [address] [place] [address], [facility], [address] [place] [address] Road  [phone]   [phone]         OUTPATIENT PHYSICAL THERAPY:Daily Note 2017    ICD-10: Treatment Diagnosis: (M54.2) Cervicalgia; (M62.81) Muscle weakness; (Z98.1) Arthrodesis L-spine,   Precautions/Allergies:   Review of patient's allergies indicates no known allergies. Fall Risk Score: 1 (? 5 = High Risk)  MD Orders: Evaluate and Treat current cervicalgia and core strengthening s/p arthrodesis MEDICAL/REFERRING DIAGNOSIS:  Arthrodesis status [Z98.1]  Cervicalgia [M54.2]   DATE OF ONSET: 2016  REFERRING PHYSICIAN: Freddy Stearns MD  RETURN PHYSICIAN APPOINTMENT: None Scheduled     INITIAL ASSESSMENT:  Ms. Vanessa Gutierrez presents today with neck pain that she's had off and on for several years due to poor posture from chest/pec tightening s/p mastectomy and reconstruction, as well as, clavicular fracture and generalized OA/DDD. She has difficulty reaching/lifting overhead due to decreased cervical/thoracic mobility, neck pain and poor posture. Pt is anxious to begin therapy and learn exercises to improve her posture and decrease her pain. Pt reports she no longer has LBP since her surgery in , however, she would like to learn exercises that will help improve her strength and safety with performing house hold and yard work activities. Pt will benefit from skilled PT to address the following deficits. PROBLEM LIST (Impacting functional limitations):  1. Decreased Strength  2. Increased Pain  3. Decreased Pacing Skills  4. Decreased Flexibility/Joint Mobility  5. Decreased Knowledge of Precautions  6. Decreased [place] with Home Exercise Program INTERVENTIONS PLANNED:  1. Balance Exercise  2. Bed Mobility  3. Cold  4. Cryotherapy  5. Electrical Stimulation  6. Heat  7. Home Exercise Program (HEP)  8. Manual Therapy  9.  Neuromuscular Re-education/Strengthening  10. Range of Motion (ROM)  11. Therapeutic Exercise/Strengthening  12. Transcutaneous Electrical Nerve Stimulation (TENS)  13. Ultrasound (US)  14. Kinesiotaping   TREATMENT PLAN:  Effective Dates: 8/31/17 TO 11/23/17. Frequency/Duration: 2 times a week for 12 weeks  GOALS: (Goals have been discussed and agreed upon with patient.)  Short-Term Functional Goals: Time Frame: 4-6 weeks (10/12/17)  1. Pt will be compliant and independent with HEP and demonstrating correct, erect posture and log rolling without an increase in pain. 2. Pt will improve NDI score to 3/50 to increase pt's overall functional mobility. 3. Pt will improve B shoulder flex/scapt/ABD AROM to 145deg with pain 3-4/10 @ worst to increase pt's ease with reaching/lifting overhead. 4. Pt to subjectively report a decrease in pain to 3-4/10 @ worst to increase pt's ease with driving. 5. Pt will improve Cervical AROM to Flex 75%, B SB; 25%; B ROT 25% and Ext: 50% with pain 3-4/10 @ worst to increase pt's ease and safety with driving. 6. Pt will subjectively report less pain and greater ease with driving, washing dishes, doing yard work and taking care of her animals. 7. Pt will improve cervical strength to 3/5 throughout to aide with pt having correct erect posture with less pain. 8. Pt will improve core and hip strength to > 3/5 throughout to prevent LBP and improve balance. Discharge Goals: Time Frame: 8-12 weeks (11/23/17)  1. Pt will improve NDI score to 2/50 to increase pt's overall functional mobility. 2. Pt will improve FLR from  CI to G 8985 CI to increase pt's overall functional mobility. 3. Pt will improve cervical AROM to Flex 80%, B SB 50%, B ROT 50% and Ext 75% with manageable pain to increase pt's ease with driving and yard work. 9. Pt will improve B shoulder flex/scapt/ABD AROM to 155deg with manageable pain to allow pt greater ease with [place] activities.    4. Pt will subjectively report a decrease in pain to 2-3/10 @ worst to allow pt greater ease with reaching overhead. 5. Pt will no longer need to wrap a towel around her neck for support while performing yard work, cooking, cleaning and washing dishes. 6. Pt will improve core and hip strength to 3+/5 throughout to increase pt's stability, balance and ease of performing yard work and caring for her animals. 7. Pt will improve cervical strength to 3+/5 throughout to further decrease pt's pain while improving posture. 8. Pt will be discharged from PT to [facility]. Rehabilitation Potential For Stated Goals: Good  Regarding Glynn Ponce's therapy, I certify that the treatment plan above will be carried out by a therapist or under their direction. Thank you for this referral,  Alivia Ibrahim PT     Referring Physician Signature: Kiley Sosa MD              Date                    The information in this section was collected on 8/31/17 (except where otherwise noted). HISTORY:   History of Present Injury/Illness (Reason for Referral):  Pt reports she's had neck and back pain for years, and she had a lumbar laminectomy /arthrodesis July 2016. She states she's trying to avoid neck surgery. She reports her neck pain/pressure worsens with all of her house/yard work, ADLs, driving, and caring for her animals. She denies taking pain meds and has not had an ANA. She states she uses a towel tied very tight around her neck to aide with unloading and supporting it. She denies having had PT after her lumbar surgery, and would like to learn exercises that will help improve her posture, neck pain and aide with preventing further injury to her back. Past Medical History/Comorbidities:   Ms. Eleonora Hernandez  has a past medical history of Arrhythmia; Blindness of right eye; Cancer ([facility] [address] (1978); Cardiac murmur; Disc degeneration, lumbar; Fibrocystic disease of breast; GERD (gastroesophageal reflux disease); Hyperlipidemia;  Hypertension; Imbalance; Osteoarthritis; Osteoporosis; Sciatica; Spinal stenosis; Squamous cell carcinoma of scalp (2014); TIA (transient ischemic attack) (2008); and Vertigo. She also has no past medical history of Adverse effect of anesthesia; Difficult intubation; Malignant hyperthermia due to anesthesia; Nausea & vomiting; or Pseudocholinesterase deficiency. Ms. Ivy Garcia  has a past surgical history that includes hysterectomy (1969); other surgical (2015); knee replacement (Left, 2007); orthopaedic (Right, 2008); knee replacement (Right, 2009); orthopaedic (2016); mastectomy (Bilateral, 1978); and cataract removal (Bilateral, 2009). Social History/Living Environment:     Pt reports she lives alone in a mobile home on .[place] acres. She states she does her own house/yard work and cares for her animals, but has neck pain. She states difficulty driving and reaching/lifting overhead due to neck and upper back pain. Prior Level of Function/Work/Activity:  Pt states she's completely independent with all activities and wants to keep it that way, but does have pain. Dominant Side:         LEFT    Current Medications:       Current Outpatient Prescriptions:     quinapril (ACCUPRIL) 10 mg tablet, TAKE 1 TAB BY MOUTH EVERY MORNING. INDICATIONS: HYPERTENSION, Disp: 90 Tab, Rfl: 3    metoprolol tartrate (LOPRESSOR) 50 mg tablet, Take 1 Tab by mouth two (2) times a day., Disp: 180 Tab, Rfl: 3    lovastatin (MEVACOR) 20 mg tablet, Take 20 mg by mouth nightly., Disp: , Rfl:     aspirin (ASPIRIN) 325 mg tablet, Take 325 mg by mouth every morning. Stopped 7/8/16, Disp: , Rfl:    Date Last Reviewed:  8/31/17   EXAMINATION:   Observation/Orthostatic Postural Assessment:          Pt sits/stands with very forward head and rounded shoulders and posterior pelvic tilt.    Palpation:          Pt with increased tightness/tenderness noted in B cervical paraspinals, B UT, B levator scap, B anterior scalenes, B SCM and B pec major/minor contributing to her thoracic and cervical pain and lack of mobility. Trigger points: + in B UT (post metcalf), B levator scap  Flexibility: moderate to severe tightness noted in B cervical paraspinals, B UT, B levator scap, B anterior scalenes, B SCM and B pec major/minor. Cervical AROM: % of normal motion in standing  Cervical spine Date:  8/31/17 Date:   Date:     Direction  Parameters Parameters Parameters   Flexion  50% w/ pain     Extension  25% w/ pain     Rotation  R: 10% w/ pain  L: 20% w/ pain     Side bending  R: 10% w/ pain  L:20% w/ pain     Shoulder Flex/Scapt R:135deg  L:135deg     Shoulder ABD R:135deg  L:135deg     Shoulder ER/IR R:WNL B  L:WNL B         Strength Date:  8/31/17 Date:   Date:     Cervical/Shoulder Parameters Parameters Parameters   Cervical 2+ to 3-/5 due to poor posture     Shoulder Flex/Scapt R:3+/5  L:3+/5     Shoulder ABD R:3/5  L:3/5        Myotomes: Normal and equal B throughout  Diaphragm (C4):  Deltoid/Biceps (C5):  Wrist Extensors (C6):  Triceps (C7):  Flexor Profundus (C8):    Sensation: Normal and equal B throughout  Biceps (C5):   Carbone Radial (C6-C7):  Carbone Ulner (C8-T1):    Special Test/Function:  Compression:+ w/ pain at C5-C6  Distraction:+ w/ decreased pain/heaviness throughout her c-spine  Spurling's maneuver:-B  Accessory mobility:restricted throughout, especially C4-C7     CLINICAL PRESENTATION:   CLINICAL DECISION MAKING:   Outcome Measure: Tool Used: Neck Disability Index (NDI)  Score:  Initial: 4/50 =8% disability Most Recent: X/50 (Date: -- )   Interpretation of Score: The Neck Disability Index is a revised form of the Oswestry Low Back Pain Index and is designed to measure the activities of daily living in person's with neck pain. Each section is scored on a 0-5 scale, 5 representing the greatest disability. The scores of each section are added together for a total score of 50. Score 0 1-10 11-20 21-30 31-40 41-49 50   Modifier CH CI CJ CK CL CM CN     ?  Carrying, Moving, and Handling Objects:     - CURRENT STATUS: CI - 1%-19% impaired, limited or restricted    - GOAL STATUS: CI - 1%-19% impaired, limited or restricted    - D/C STATUS:  ---------------To be determined---------------       Medical Necessity:   · Patient is expected to demonstrate progress in strength, range of motion, balance, coordination and functional technique to improve safety during driving and decrease pain, as well as, increase ease with lifting/reaching overhead to increase pt's ease with house work, yard work and caring for her animals. .  Reason for Services/Other Comments:  · Patient reports she lives alone in a bad neighborhood. She reports she's completely independent with all of her ADLS, house hold and work activities. She states she has pain and difficulty completing them. She reports she currently ties a towel around her neck for support, and it helps, however she appears weak to her rough neighbors. She states she needs assistance in \"getting rid of\" her neck pain, as well as, education on how to get stronger and improve her posture so that she doesn't appear vulnerable. .            TREATMENT:   (In addition to Assessment/Re-Assessment sessions the following treatments were rendered)  Pre-treatment Symptoms/Complaints:  Pt reports less neck pain and tightness this week, and after mowing her large back yard. Pain: Initial:     2-3/10 Post Session:  1/10 after traction/exercises     US: (10minutes): @ 1MHz, 100% . 9w/cm2 to B cervical paraspinals, B UT and B levator scap to aide with decreasing pain and tightness, followed by    MANUAL THERAPY: (30minutes): Consisting of STM/MFR to B cervical paraspinals, B UT, B levator scap and B anterior scalenes to aide with improving tissue mobility while decreasing pain. Pt also received manual cervical traction, as well as, SOC release to aide with improving joint mobility.      THERAPEUTIC EXERCISE: (15 minutes minutes):  Exercises per grid below to improve mobility, strength, balance and coordination. Required moderate visual, verbal and tactile cues to promote proper body alignment, promote proper body posture, promote proper body mechanics and promote proper body breathing techniques. Progressed resistance, range, repetitions and complexity of movement as indicated. Date:  8/31/17 Date:  9/13/17 Date:  9/18/17 Date:   9/21/17   Activity/Exercise Parameters Parameters Parameters    Shoulder shrugs 10x   10x   Posterior shoulder rolls 10x 10x  10x   Scapular retraction 10x 10x  10x   Chin tucks (sitting/supine) 10 x 5sec 20 x 5 sec     Cervical rotation to the left 5x      Cervical flexion 5x      Seated UT stretch 2 x 20sec      Stdg drwy pec stretch 1 x 20sec 2 x 20sec     LTR 10x 20x     TAs w/ pelvic tilt 10x 5sec 20 x 5sec     Bridging w/ TAs, pelvic tilt and add sq  20x     Supine hip ABD w/ tband  OTB, 20x     Stdg B shoulder extension w/tband   YTB, 20x YTB, 20x   Stdg B rowing w/ tband   YTB, 20x YTB, 20x   Stdg B horizontal ABD w/tbans   YTB, 20x YTB, 20x          STM/MFR to C-spine/UT  done done done   Cervical Traction/SOC release 5\" done done done   Education HEP, scapular retraction/depression/TAs TAs/glute sq w/ all standing/lifting; proper lifting techniques HEP for proper posture with scap retraction and depression HEP for proper posture w/ scap retraction and depression     Treatment/Session Assessment: B cervical paraspinal, B UT and B levator scap tightness is improving with improved cervical AROM today. She was much more tolerant of manual therapy today. · Response to Treatment:  Pt did well with above tband exercises, therefore I added those to her HEP. · Compliance with Program/Exercises: Pt reports compliance with HEP 1x daily since last PT visit. Recommendations/Intent for next treatment session: \"Continue with PT to improve cervical and B UE AROM and strength, as well as, core strength to decrease cervical and lumbar pain.  Manual techniques and modalities should be utilized if pt continues to respond well to them. Progress mid back and abdominal strengthening as pt is able. \".   Future Appointments  Date Time Provider Sunny Burden   9/26/2017 2:00 PM [address], [facility]   9/28/2017 2:00 PM [address], [facility]   10/4/2017 2:00 PM [address], [facility]   10/5/2017 10:45 AM Jimmy Burns MD [facility]   10/9/2017 2:00 PM [address], [facility]   10/11/2017 2:00 PM [address], [facility]   10/18/2017 2:00 PM [address], [facility]   10/23/2017 2:00 PM [address], [facility]   10/25/2017 2:00 PM [address], [facility]     Total Treatment Duration:  PT Patient Time In/Time Out  Time In: 1400  Time Out: [address], PT

## 2017-09-26 ENCOUNTER — HOSPITAL ENCOUNTER (OUTPATIENT)
Dept: PHYSICAL THERAPY | Age: 77
Discharge: HOME | End: 2017-09-26
Payer: MEDICARE

## 2017-09-26 PROCEDURE — 97035 APP MDLTY 1+ULTRASOUND EA 15: CPT | Performed by: PHYSICAL THERAPIST

## 2017-09-26 PROCEDURE — 97140 MANUAL THERAPY 1/> REGIONS: CPT | Performed by: PHYSICAL THERAPIST

## 2017-09-26 NOTE — PROGRESS NOTES
Gabriella Alonso Ponce  : 1940   [address]. [address] [place] [address], [facility], [address] [place] [address] Road  [phone]   [phone]         OUTPATIENT PHYSICAL THERAPY:Daily Note 2017    ICD-10: Treatment Diagnosis: (M54.2) Cervicalgia; (M62.81) Muscle weakness; (Z98.1) Arthrodesis L-spine,   Precautions/Allergies:   Review of patient's allergies indicates no known allergies. Fall Risk Score: 1 (? 5 = High Risk)  MD Orders: Evaluate and Treat current cervicalgia and core strengthening s/p arthrodesis MEDICAL/REFERRING DIAGNOSIS:  Arthrodesis status [Z98.1]  Cervicalgia [M54.2]   DATE OF ONSET: 2016  REFERRING PHYSICIAN: Shelly Jain MD  RETURN PHYSICIAN APPOINTMENT: None Scheduled     INITIAL ASSESSMENT:  Ms. Ivory Martinez presents today with neck pain that she's had off and on for several years due to poor posture from chest/pec tightening s/p mastectomy and reconstruction, as well as, clavicular fracture and generalized OA/DDD. She has difficulty reaching/lifting overhead due to decreased cervical/thoracic mobility, neck pain and poor posture. Pt is anxious to begin therapy and learn exercises to improve her posture and decrease her pain. Pt reports she no longer has LBP since her surgery in , however, she would like to learn exercises that will help improve her strength and safety with performing house hold and yard work activities. Pt will benefit from skilled PT to address the following deficits. PROBLEM LIST (Impacting functional limitations):  1. Decreased Strength  2. Increased Pain  3. Decreased Pacing Skills  4. Decreased Flexibility/Joint Mobility  5. Decreased Knowledge of Precautions  6. Decreased [place] with Home Exercise Program INTERVENTIONS PLANNED:  1. Balance Exercise  2. Bed Mobility  3. Cold  4. Cryotherapy  5. Electrical Stimulation  6. Heat  7. Home Exercise Program (HEP)  8. Manual Therapy  9.  Neuromuscular Re-education/Strengthening  10. Range of Motion (ROM)  11. Therapeutic Exercise/Strengthening  12. Transcutaneous Electrical Nerve Stimulation (TENS)  13. Ultrasound (US)  14. Kinesiotaping   TREATMENT PLAN:  Effective Dates: 8/31/17 TO 11/23/17. Frequency/Duration: 2 times a week for 12 weeks  GOALS: (Goals have been discussed and agreed upon with patient.)  Short-Term Functional Goals: Time Frame: 4-6 weeks (10/12/17)  1. Pt will be compliant and independent with HEP and demonstrating correct, erect posture and log rolling without an increase in pain. 2. Pt will improve NDI score to 3/50 to increase pt's overall functional mobility. 3. Pt will improve B shoulder flex/scapt/ABD AROM to 145deg with pain 3-4/10 @ worst to increase pt's ease with reaching/lifting overhead. 4. Pt to subjectively report a decrease in pain to 3-4/10 @ worst to increase pt's ease with driving. 5. Pt will improve Cervical AROM to Flex 75%, B SB; 25%; B ROT 25% and Ext: 50% with pain 3-4/10 @ worst to increase pt's ease and safety with driving. 6. Pt will subjectively report less pain and greater ease with driving, washing dishes, doing yard work and taking care of her animals. 7. Pt will improve cervical strength to 3/5 throughout to aide with pt having correct erect posture with less pain. 8. Pt will improve core and hip strength to > 3/5 throughout to prevent LBP and improve balance. Discharge Goals: Time Frame: 8-12 weeks (11/23/17)  1. Pt will improve NDI score to 2/50 to increase pt's overall functional mobility. 2. Pt will improve FLR from  CI to G 8985 CI to increase pt's overall functional mobility. 3. Pt will improve cervical AROM to Flex 80%, B SB 50%, B ROT 50% and Ext 75% with manageable pain to increase pt's ease with driving and yard work. 9. Pt will improve B shoulder flex/scapt/ABD AROM to 155deg with manageable pain to allow pt greater ease with [place] activities.    4. Pt will subjectively report a decrease in pain to 2-3/10 @ worst to allow pt greater ease with reaching overhead. 5. Pt will no longer need to wrap a towel around her neck for support while performing yard work, cooking, cleaning and washing dishes. 6. Pt will improve core and hip strength to 3+/5 throughout to increase pt's stability, balance and ease of performing yard work and caring for her animals. 7. Pt will improve cervical strength to 3+/5 throughout to further decrease pt's pain while improving posture. 8. Pt will be discharged from PT to [facility]. Rehabilitation Potential For Stated Goals: Good  Regarding Glynn Ponce's therapy, I certify that the treatment plan above will be carried out by a therapist or under their direction. Thank you for this referral,  Alivia Ibrahim PT     Referring Physician Signature: Kiley Sosa MD              Date                    The information in this section was collected on 8/31/17 (except where otherwise noted). HISTORY:   History of Present Injury/Illness (Reason for Referral):  Pt reports she's had neck and back pain for years, and she had a lumbar laminectomy /arthrodesis July 2016. She states she's trying to avoid neck surgery. She reports her neck pain/pressure worsens with all of her house/yard work, ADLs, driving, and caring for her animals. She denies taking pain meds and has not had an ANA. She states she uses a towel tied very tight around her neck to aide with unloading and supporting it. She denies having had PT after her lumbar surgery, and would like to learn exercises that will help improve her posture, neck pain and aide with preventing further injury to her back. Past Medical History/Comorbidities:   Ms. Eleonora Hernandez  has a past medical history of Arrhythmia; Blindness of right eye; Cancer ([facility] [address] (1978); Cardiac murmur; Disc degeneration, lumbar; Fibrocystic disease of breast; GERD (gastroesophageal reflux disease); Hyperlipidemia;  Hypertension; Imbalance; Osteoarthritis; Osteoporosis; Sciatica; Spinal stenosis; Squamous cell carcinoma of scalp (2014); TIA (transient ischemic attack) (2008); and Vertigo. She also has no past medical history of Adverse effect of anesthesia; Difficult intubation; Malignant hyperthermia due to anesthesia; Nausea & vomiting; or Pseudocholinesterase deficiency. Ms. Keli Jones  has a past surgical history that includes hysterectomy (1969); other surgical (2015); knee replacement (Left, 2007); orthopaedic (Right, 2008); knee replacement (Right, 2009); orthopaedic (2016); mastectomy (Bilateral, 1978); and cataract removal (Bilateral, 2009). Social History/Living Environment:     Pt reports she lives alone in a mobile home on .[place] acres. She states she does her own house/yard work and cares for her animals, but has neck pain. She states difficulty driving and reaching/lifting overhead due to neck and upper back pain. Prior Level of Function/Work/Activity:  Pt states she's completely independent with all activities and wants to keep it that way, but does have pain. Dominant Side:         LEFT    Current Medications:       Current Outpatient Prescriptions:     quinapril (ACCUPRIL) 10 mg tablet, TAKE 1 TAB BY MOUTH EVERY MORNING. INDICATIONS: HYPERTENSION, Disp: 90 Tab, Rfl: 3    metoprolol tartrate (LOPRESSOR) 50 mg tablet, Take 1 Tab by mouth two (2) times a day., Disp: 180 Tab, Rfl: 3    lovastatin (MEVACOR) 20 mg tablet, Take 20 mg by mouth nightly., Disp: , Rfl:     aspirin (ASPIRIN) 325 mg tablet, Take 325 mg by mouth every morning. Stopped 7/8/16, Disp: , Rfl:    Date Last Reviewed:  8/31/17   EXAMINATION:   Observation/Orthostatic Postural Assessment:          Pt sits/stands with very forward head and rounded shoulders and posterior pelvic tilt.    Palpation:          Pt with increased tightness/tenderness noted in B cervical paraspinals, B UT, B levator scap, B anterior scalenes, B SCM and B pec major/minor contributing to her thoracic and cervical pain and lack of mobility. Trigger points: + in B UT (post metcalf), B levator scap  Flexibility: moderate to severe tightness noted in B cervical paraspinals, B UT, B levator scap, B anterior scalenes, B SCM and B pec major/minor. Cervical AROM: % of normal motion in standing  Cervical spine Date:  8/31/17 Date:   Date:     Direction  Parameters Parameters Parameters   Flexion  50% w/ pain     Extension  25% w/ pain     Rotation  R: 10% w/ pain  L: 20% w/ pain     Side bending  R: 10% w/ pain  L:20% w/ pain     Shoulder Flex/Scapt R:135deg  L:135deg     Shoulder ABD R:135deg  L:135deg     Shoulder ER/IR R:WNL B  L:WNL B         Strength Date:  8/31/17 Date:   Date:     Cervical/Shoulder Parameters Parameters Parameters   Cervical 2+ to 3-/5 due to poor posture     Shoulder Flex/Scapt R:3+/5  L:3+/5     Shoulder ABD R:3/5  L:3/5        Myotomes: Normal and equal B throughout  Diaphragm (C4):  Deltoid/Biceps (C5):  Wrist Extensors (C6):  Triceps (C7):  Flexor Profundus (C8):    Sensation: Normal and equal B throughout  Biceps (C5):   Carbone Radial (C6-C7):  Carbone Ulner (C8-T1):    Special Test/Function:  Compression:+ w/ pain at C5-C6  Distraction:+ w/ decreased pain/heaviness throughout her c-spine  Spurling's maneuver:-B  Accessory mobility:restricted throughout, especially C4-C7     CLINICAL PRESENTATION:   CLINICAL DECISION MAKING:   Outcome Measure: Tool Used: Neck Disability Index (NDI)  Score:  Initial: 4/50 =8% disability Most Recent: X/50 (Date: -- )   Interpretation of Score: The Neck Disability Index is a revised form of the Oswestry Low Back Pain Index and is designed to measure the activities of daily living in person's with neck pain. Each section is scored on a 0-5 scale, 5 representing the greatest disability. The scores of each section are added together for a total score of 50. Score 0 1-10 11-20 21-30 31-40 41-49 50   Modifier CH CI CJ CK CL CM CN     ?  Carrying, Moving, and Handling Objects:     - CURRENT STATUS: CI - 1%-19% impaired, limited or restricted    - GOAL STATUS: CI - 1%-19% impaired, limited or restricted    - D/C STATUS:  ---------------To be determined---------------       Medical Necessity:   · Patient is expected to demonstrate progress in strength, range of motion, balance, coordination and functional technique to improve safety during driving and decrease pain, as well as, increase ease with lifting/reaching overhead to increase pt's ease with house work, yard work and caring for her animals. .  Reason for Services/Other Comments:  · Patient reports she lives alone in a bad neighborhood. She reports she's completely independent with all of her ADLS, house hold and work activities. She states she has pain and difficulty completing them. She reports she currently ties a towel around her neck for support, and it helps, however she appears weak to her rough neighbors. She states she needs assistance in \"getting rid of\" her neck pain, as well as, education on how to get stronger and improve her posture so that she doesn't appear vulnerable. .            TREATMENT:   (In addition to Assessment/Re-Assessment sessions the following treatments were rendered)  Pre-treatment Symptoms/Complaints:  Pt reports her neck pain and tightness continues to improve, however, she's a little more tight today due to over doing it with her HEP using bands over the weekend. Pain: Initial:     3/10 Post Session:  1-2/10 after traction/exercises     US: (10minutes): @ 1MHz, 100% . 9w/cm2 to B cervical paraspinals, B UT and B levator scap to aide with decreasing pain and tightness, followed by    MANUAL THERAPY: (45minutes): Consisting of STM/MFR to B cervical paraspinals, B UT, B levator scap and B anterior scalenes to aide with improving tissue mobility while decreasing pain. Pt also received manual cervical traction, as well as, SOC release to aide with improving joint mobility. Kinesiotape to B UT for inhibition (\"I\" strips). THERAPEUTIC EXERCISE: (0 minutes minutes):  Exercises per grid below to improve mobility, strength, balance and coordination. Required moderate visual, verbal and tactile cues to promote proper body alignment, promote proper body posture, promote proper body mechanics and promote proper body breathing techniques. Progressed resistance, range, repetitions and complexity of movement as indicated. Date:  8/31/17 Date:  9/13/17 Date:  9/18/17 Date:   9/21/17 Date:  9/26/17   Activity/Exercise Parameters Parameters Parameters     Shoulder shrugs 10x   10x    Posterior shoulder rolls 10x 10x  10x    Scapular retraction 10x 10x  10x    Chin tucks (sitting/supine) 10 x 5sec 20 x 5 sec      Cervical rotation to the left 5x       Cervical flexion 5x       Seated UT stretch 2 x 20sec       Stdg drwy pec stretch 1 x 20sec 2 x 20sec      LTR 10x 20x      TAs w/ pelvic tilt 10x 5sec 20 x 5sec      Bridging w/ TAs, pelvic tilt and add sq  20x      Supine hip ABD w/ tband  OTB, 20x      Stdg B shoulder extension w/tband   YTB, 20x YTB, 20x    Stdg B rowing w/ tband   YTB, 20x YTB, 20x    Stdg B horizontal ABD w/tbans   YTB, 20x YTB, 20x    Kinesiotape     done   STM/MFR to C-spine/UT  done done done done   Cervical Traction/SOC release 5\" done done done done   Education HEP, scapular retraction/depression/TAs TAs/glute sq w/ all standing/lifting; proper lifting techniques HEP for proper posture with scap retraction and depression HEP for proper posture w/ scap retraction and depression HEP for posture     Treatment/Session Assessment: R cervical paraspinal and UT tightness and tenderness remains present today due to a mechanical rotation at C5-C6. She subjectively reported less pain and tightness after manual traction SOC. · Response to Treatment:  Min increase in tenderness after STM/MFR, but it decreased after manual traction and SOC.    · Compliance with Program/Exercises: Pt reports compliance with HEP 1x daily since last PT visit. Recommendations/Intent for next treatment session: \"Continue with PT to improve cervical and B UE AROM and strength, as well as, core strength to decrease cervical and lumbar pain. Manual techniques and modalities should be utilized if pt continues to respond well to them. Progress mid back and abdominal strengthening as pt is able. \".   Future Appointments  Date Time Provider Sunny Burden   9/28/2017 2:00 PM [address], PT [facility]   10/4/2017 2:00 PM [address], PT [facility]   10/5/2017 10:45 AM Mariel Rodrigez MD [facility]   10/9/2017 2:00 PM [address], PT [facility]   10/11/2017 2:00 PM [address], PT [facility]   10/18/2017 2:00 PM [address], PT [facility]   10/23/2017 2:00 PM [address], PT [facility]   10/25/2017 2:00 PM [address], PT [facility]     Total Treatment Duration:  PT Patient Time In/Time Out  Time In: 1410  Time Out: [address], PT

## 2017-09-28 ENCOUNTER — HOSPITAL ENCOUNTER (OUTPATIENT)
Dept: PHYSICAL THERAPY | Age: 77
Discharge: HOME | End: 2017-09-28
Payer: MEDICARE

## 2017-09-28 PROCEDURE — 97035 APP MDLTY 1+ULTRASOUND EA 15: CPT | Performed by: PHYSICAL THERAPIST

## 2017-09-28 PROCEDURE — 97110 THERAPEUTIC EXERCISES: CPT | Performed by: PHYSICAL THERAPIST

## 2017-09-28 PROCEDURE — 97140 MANUAL THERAPY 1/> REGIONS: CPT | Performed by: PHYSICAL THERAPIST

## 2017-10-02 ENCOUNTER — APPOINTMENT (OUTPATIENT)
Dept: PHYSICAL THERAPY | Age: 77
End: 2017-10-02
Payer: MEDICARE

## 2017-10-04 ENCOUNTER — APPOINTMENT (OUTPATIENT)
Dept: PHYSICAL THERAPY | Age: 77
End: 2017-10-04
Payer: MEDICARE

## 2017-10-09 ENCOUNTER — HOSPITAL ENCOUNTER (OUTPATIENT)
Dept: PHYSICAL THERAPY | Age: 77
Discharge: HOME | End: 2017-10-09
Payer: MEDICARE

## 2017-10-09 NOTE — PROGRESS NOTES
Pt called to cancel her PT appointment this am due to not feeling well as she's been on meds from her cataract surgery that has upset her stomach. She is scheduled to be seen by PT later this week.

## 2017-10-11 ENCOUNTER — HOSPITAL ENCOUNTER (OUTPATIENT)
Dept: PHYSICAL THERAPY | Age: 77
Discharge: HOME | End: 2017-10-11
Payer: MEDICARE

## 2017-10-11 PROCEDURE — 97035 APP MDLTY 1+ULTRASOUND EA 15: CPT | Performed by: PHYSICAL THERAPIST

## 2017-10-11 PROCEDURE — 97140 MANUAL THERAPY 1/> REGIONS: CPT | Performed by: PHYSICAL THERAPIST

## 2017-10-11 NOTE — PROGRESS NOTES
Peter Middleton [place]  : 1940   Goes [address] at [facility]. [address], [facility], [id number] [place] [address] Road  [phone]   [phone]         OUTPATIENT PHYSICAL THERAPY:Daily Note 10/11/2017    ICD-10: Treatment Diagnosis: (M54.2) Cervicalgia; (M62.81) Muscle weakness; (Z98.1) Arthrodesis L-spine,   Precautions/Allergies:   Review of patient's allergies indicates no known allergies. Fall Risk Score: 1 (? 5 = High Risk)  MD Orders: Evaluate and Treat current cervicalgia and core strengthening s/p arthrodesis MEDICAL/REFERRING DIAGNOSIS:  Arthrodesis status [Z98.1]  Cervicalgia [M54.2]   DATE OF ONSET: 2016  REFERRING PHYSICIAN: Yamilet George MD  RETURN PHYSICIAN APPOINTMENT: None Scheduled     INITIAL ASSESSMENT:  Ms. Kenna Guzman presents today with neck pain that she's had off and on for several years due to poor posture from chest/pec tightening s/p mastectomy and reconstruction, as well as, clavicular fracture and generalized OA/DDD. She has difficulty reaching/lifting overhead due to decreased cervical/thoracic mobility, neck pain and poor posture. Pt is anxious to begin therapy and learn exercises to improve her posture and decrease her pain. Pt reports she no longer has LBP since her surgery in , however, she would like to learn exercises that will help improve her strength and safety with performing house hold and yard work activities. Pt will benefit from skilled PT to address the following deficits. PROBLEM LIST (Impacting functional limitations):  1. Decreased Strength  2. Increased Pain  3. Decreased Pacing Skills  4. Decreased Flexibility/Joint Mobility  5. Decreased Knowledge of Precautions  6. Decreased [place] with Home Exercise Program INTERVENTIONS PLANNED:  1. Balance Exercise  2. Bed Mobility  3. Cold  4. Cryotherapy  5. Electrical Stimulation  6. Heat  7. Home Exercise Program (HEP)  8. Manual Therapy  9.  Neuromuscular Re-education/Strengthening  10. Range of Motion (ROM)  11. Therapeutic Exercise/Strengthening  12. Transcutaneous Electrical Nerve Stimulation (TENS)  13. Ultrasound (US)  14. Kinesiotaping   TREATMENT PLAN:  Effective Dates: 8/31/17 TO 11/23/17. Frequency/Duration: 2 times a week for 12 weeks  GOALS: (Goals have been discussed and agreed upon with patient.)  Short-Term Functional Goals: Time Frame: 4-6 weeks (10/12/17)  1. Pt will be compliant and independent with HEP and demonstrating correct, erect posture and log rolling without an increase in pain. 2. Pt will improve NDI score to 3/50 to increase pt's overall functional mobility. 3. Pt will improve B shoulder flex/scapt/ABD AROM to 145deg with pain 3-4/10 @ worst to increase pt's ease with reaching/lifting overhead. 4. Pt to subjectively report a decrease in pain to 3-4/10 @ worst to increase pt's ease with driving. 5. Pt will improve Cervical AROM to Flex 75%, B SB; 25%; B ROT 25% and Ext: 50% with pain 3-4/10 @ worst to increase pt's ease and safety with driving. 6. Pt will subjectively report less pain and greater ease with driving, washing dishes, doing yard work and taking care of her animals. 7. Pt will improve cervical strength to 3/5 throughout to aide with pt having correct erect posture with less pain. 8. Pt will improve core and hip strength to > 3/5 throughout to prevent LBP and improve balance. Discharge Goals: Time Frame: 8-12 weeks (11/23/17)  1. Pt will improve NDI score to 2/50 to increase pt's overall functional mobility. 2. Pt will improve FLR from  CI to G 8985 CI to increase pt's overall functional mobility. 3. Pt will improve cervical AROM to Flex 80%, B SB 50%, B ROT 50% and Ext 75% with manageable pain to increase pt's ease with driving and yard work. 9. Pt will improve B shoulder flex/scapt/ABD AROM to 155deg with manageable pain to allow pt greater ease with [place] activities.    4. Pt will subjectively report a decrease in pain to 2-3/10 @ worst to allow pt greater ease with reaching overhead. 5. Pt will no longer need to wrap a towel around her neck for support while performing yard work, cooking, cleaning and washing dishes. 6. Pt will improve core and hip strength to 3+/5 throughout to increase pt's stability, balance and ease of performing yard work and caring for her animals. 7. Pt will improve cervical strength to 3+/5 throughout to further decrease pt's pain while improving posture. 8. Pt will be discharged from PT to [facility]. Rehabilitation Potential For Stated Goals: Good  Regarding Felecia Mancera Ponce's therapy, I certify that the treatment plan above will be carried out by a therapist or under their direction. Thank you for this referral,  Ranjit Vargas, PT     Referring Physician Signature: Moshe Rodgers MD              Date                    The information in this section was collected on 8/31/17 (except where otherwise noted). HISTORY:   History of Present Injury/Illness (Reason for Referral):  Pt reports she's had neck and back pain for years, and she had a lumbar laminectomy /arthrodesis July 2016. She states she's trying to avoid neck surgery. She reports her neck pain/pressure worsens with all of her house/yard work, ADLs, driving, and caring for her animals. She denies taking pain meds and has not had an ANA. She states she uses a towel tied very tight around her neck to aide with unloading and supporting it. She denies having had PT after her lumbar surgery, and would like to learn exercises that will help improve her posture, neck pain and aide with preventing further injury to her back. Past Medical History/Comorbidities:   Ms. Madelin House  has a past medical history of Arrhythmia; Blindness of right eye; Cancer ([facility] [address] (1978); Cardiac murmur; Disc degeneration, lumbar; Fibrocystic disease of breast; GERD (gastroesophageal reflux disease); Hyperlipidemia;  Hypertension; Imbalance; Osteoarthritis; Osteoporosis; Sciatica; Spinal stenosis; Squamous cell carcinoma of scalp (2014); TIA (transient ischemic attack) (2008); and Vertigo. She also has no past medical history of Adverse effect of anesthesia; Difficult intubation; Malignant hyperthermia due to anesthesia; Nausea & vomiting; or Pseudocholinesterase deficiency. Ms. Julia Gauthier  has a past surgical history that includes hysterectomy (1969); other surgical (2015); knee replacement (Left, 2007); orthopaedic (Right, 2008); knee replacement (Right, 2009); orthopaedic (2016); mastectomy (Bilateral, 1978); and cataract removal (Bilateral, 2009). Social History/Living Environment:     Pt reports she lives alone in a mobile home on .[place] acres. She states she does her own house/yard work and cares for her animals, but has neck pain. She states difficulty driving and reaching/lifting overhead due to neck and upper back pain. Prior Level of Function/Work/Activity:  Pt states she's completely independent with all activities and wants to keep it that way, but does have pain. Dominant Side:         LEFT    Current Medications:       Current Outpatient Prescriptions:     quinapril (ACCUPRIL) 10 mg tablet, TAKE 1 TAB BY MOUTH EVERY MORNING. INDICATIONS: HYPERTENSION, Disp: 90 Tab, Rfl: 3    metoprolol tartrate (LOPRESSOR) 50 mg tablet, Take 1 Tab by mouth two (2) times a day., Disp: 180 Tab, Rfl: 3    lovastatin (MEVACOR) 20 mg tablet, Take 20 mg by mouth nightly., Disp: , Rfl:     aspirin (ASPIRIN) 325 mg tablet, Take 325 mg by mouth every morning. Stopped 7/8/16, Disp: , Rfl:    Date Last Reviewed:  8/31/17   EXAMINATION:   Observation/Orthostatic Postural Assessment:          Pt sits/stands with very forward head and rounded shoulders and posterior pelvic tilt.    Palpation:          Pt with increased tightness/tenderness noted in B cervical paraspinals, B UT, B levator scap, B anterior scalenes, B SCM and B pec major/minor contributing to her thoracic and cervical pain and lack of mobility. Trigger points: + in B UT (post metcalf), B levator scap  Flexibility: moderate to severe tightness noted in B cervical paraspinals, B UT, B levator scap, B anterior scalenes, B SCM and B pec major/minor. Cervical AROM: % of normal motion in standing  Cervical spine Date:  8/31/17 Date:   Date:     Direction  Parameters Parameters Parameters   Flexion  50% w/ pain     Extension  25% w/ pain     Rotation  R: 10% w/ pain  L: 20% w/ pain     Side bending  R: 10% w/ pain  L:20% w/ pain     Shoulder Flex/Scapt R:135deg  L:135deg     Shoulder ABD R:135deg  L:135deg     Shoulder ER/IR R:WNL B  L:WNL B         Strength Date:  8/31/17 Date:   Date:     Cervical/Shoulder Parameters Parameters Parameters   Cervical 2+ to 3-/5 due to poor posture     Shoulder Flex/Scapt R:3+/5  L:3+/5     Shoulder ABD R:3/5  L:3/5        Myotomes: Normal and equal B throughout  Diaphragm (C4):  Deltoid/Biceps (C5):  Wrist Extensors (C6):  Triceps (C7):  Flexor Profundus (C8):    Sensation: Normal and equal B throughout  Biceps (C5):   Carbone Radial (C6-C7):  Carbone Ulner (C8-T1):    Special Test/Function:  Compression:+ w/ pain at C5-C6  Distraction:+ w/ decreased pain/heaviness throughout her c-spine  Spurling's maneuver:-B  Accessory mobility:restricted throughout, especially C4-C7     CLINICAL PRESENTATION:   CLINICAL DECISION MAKING:   Outcome Measure: Tool Used: Neck Disability Index (NDI)  Score:  Initial: 4/50 =8% disability Most Recent: X/50 (Date: -- )   Interpretation of Score: The Neck Disability Index is a revised form of the Oswestry Low Back Pain Index and is designed to measure the activities of daily living in person's with neck pain. Each section is scored on a 0-5 scale, 5 representing the greatest disability. The scores of each section are added together for a total score of 50. Score 0 1-10 11-20 21-30 31-40 41-49 50   Modifier CH CI CJ CK CL CM CN     ?  Carrying, Moving, and Handling Objects:     - CURRENT STATUS: CI - 1%-19% impaired, limited or restricted    - GOAL STATUS: CI - 1%-19% impaired, limited or restricted    - D/C STATUS:  ---------------To be determined---------------       Medical Necessity:   · Patient is expected to demonstrate progress in strength, range of motion, balance, coordination and functional technique to improve safety during driving and decrease pain, as well as, increase ease with lifting/reaching overhead to increase pt's ease with house work, yard work and caring for her animals. .  Reason for Services/Other Comments:  · Patient reports she lives alone in a bad neighborhood. She reports she's completely independent with all of her ADLS, house hold and work activities. She states she has pain and difficulty completing them. She reports she currently ties a towel around her neck for support, and it helps, however she appears weak to her rough neighbors. She states she needs assistance in \"getting rid of\" her neck pain, as well as, education on how to get stronger and improve her posture so that she doesn't appear vulnerable. .            TREATMENT:     Pre-treatment Symptoms/Complaints:  Pt reports she's missed PT x 2 weeks due to having skin cancer removed from her nose. She states the surgery, and after effects were much more involved than she originally thought. She c/o moderate neck pain and stiffness this pm due to not being able to move much over the last two weeks. She states \" I feel like I've gone backwards. \"  Pain: Initial:     6-7/10 Post Session:  3-4/10 after traction/exercises     US: (10minutes): @ 1MHz, 100% ,1.0w/cm2 to B cervical paraspinals, B UT and B levator scap to aide with decreasing pain and tightness, followed by    MANUAL THERAPY: (30minutes): Consisting of STM/MFR to B cervical paraspinals, B UT, B levator scap and B anterior scalenes to aide with improving tissue mobility while decreasing pain.  Pt also received seated manual cervical traction as she ccan not yet lie supine due to her nose surgery. THERAPEUTIC EXERCISE: (0 minutes minutes):  Exercises per grid below to improve mobility, strength, balance and coordination. Required moderate visual, verbal and tactile cues to promote proper body alignment, promote proper body posture, promote proper body mechanics and promote proper body breathing techniques. Progressed resistance, range, repetitions and complexity of movement as indicated.      Date:  8/31/17 Date:  9/13/17 Date:  9/18/17 Date:   9/21/17 Date:  9/26/17 Date:  9/28/17 Date:  10/11/17   Activity/Exercise Parameters Parameters Parameters       Shoulder shrugs 10x   10x      Posterior shoulder rolls 10x 10x  10x      Scapular retraction 10x 10x  10x      Chin tucks (sitting/supine) 10 x 5sec 20 x 5 sec        Cervical rotation to the left 5x         Cervical flexion 5x         Seated UT stretch 2 x 20sec         Stdg drwy pec stretch 1 x 20sec 2 x 20sec        LTR 10x 20x        TAs w/ pelvic tilt 10x 5sec 20 x 5sec        Bridging w/ TAs, pelvic tilt and add sq  20x        Supine hip ABD w/ tband  OTB, 20x        Stdg B shoulder extension w/tband   YTB, 20x YTB, 20x  YTB, 15x    Stdg B rowing w/ tband   YTB, 20x YTB, 20x  YTB, 15x    Stdg B horizontal ABD w/tbans   YTB, 20x YTB, 20x  YTB, 15x    Stdg flex AROM      15x    Stdg scapt AROM      15x    Stdg ABD AROM      15x    Kinesiotape     done     STM/MFR to C-spine/UT  done done done done Done as above Done as above   Cervical Traction/SOC release 5\" done done done done Done as above Done as above   Education HEP, scapular retraction/depression/TAs TAs/glute sq w/ all standing/lifting; proper lifting techniques HEP for proper posture with scap retraction and depression HEP for proper posture w/ scap retraction and depression HEP for posture HEP for AROM, stretching and posture HEP for AROM, stretching nd posture as able     Treatment/Session Assessment: Pt declined exercises today due to an increase in neck pain, as well as, per recommendation from MD due to nose surgery. She was more palpably tight and tender with STM/MFR today. She was able to allow trigger point release in B UT, L >R. R was too painful. · Response to Treatment:  Pt subjectively reported less pain/tightness and improved cervical motion after US and manual therapy. · Compliance with Program/Exercises: Pt denies compliance with HEP over the last 2 weeks. She was encouraged to return to basic ROM and stretching exercises. Recommendations/Intent for next treatment session: \"Continue with PT to improve cervical and B UE AROM and strength, as well as, core strength to decrease cervical and lumbar pain. Manual techniques and modalities should be utilized if pt continues to respond well to them. Progress mid back and abdominal strengthening as pt is able. Pt given NDI to fill out and return at next visit. Re-assess next week. \".   Future Appointments  Date Time Provider Sunny Jenise   10/18/2017 2:00 PM [address] , PT [facility]   10/23/2017 2:00 PM [address], PT [facility]   10/25/2017 2:00 PM [address], PT [facility]   1/19/2018 10:45 AM Radha Sanders MD [facility]     Total Treatment Duration:  PT Patient Time In/Time Out  Time In: 7600  Time Out: 419 [facility], PT

## 2017-10-16 ENCOUNTER — APPOINTMENT (OUTPATIENT)
Dept: PHYSICAL THERAPY | Age: 77
End: 2017-10-16
Payer: MEDICARE

## 2017-10-18 ENCOUNTER — HOSPITAL ENCOUNTER (OUTPATIENT)
Dept: PHYSICAL THERAPY | Age: 77
Discharge: HOME | End: 2017-10-18
Payer: MEDICARE

## 2017-10-18 PROCEDURE — G8985 CARRY GOAL STATUS: HCPCS | Performed by: PHYSICAL THERAPIST

## 2017-10-18 PROCEDURE — 97110 THERAPEUTIC EXERCISES: CPT | Performed by: PHYSICAL THERAPIST

## 2017-10-18 PROCEDURE — 97035 APP MDLTY 1+ULTRASOUND EA 15: CPT | Performed by: PHYSICAL THERAPIST

## 2017-10-18 PROCEDURE — G8984 CARRY CURRENT STATUS: HCPCS | Performed by: PHYSICAL THERAPIST

## 2017-10-18 PROCEDURE — 97140 MANUAL THERAPY 1/> REGIONS: CPT | Performed by: PHYSICAL THERAPIST

## 2017-10-18 NOTE — PROGRESS NOTES
Ivory Hayes Wall  : 1940   [address]es [address]. [address], [place], [address] [address]  [phone]   [phone]         OUTPATIENT PHYSICAL THERAPY:Progress Report 10/18/2017    ICD-10: Treatment Diagnosis: (M54.2) Cervicalgia; (M62.81) Muscle weakness; (Z98.1) Arthrodesis L-spine,   Precautions/Allergies:   Review of patient's allergies indicates no known allergies. Fall Risk Score: 1 (? 5 = High Risk)  MD Orders: Evaluate and Treat current cervicalgia and core strengthening s/p arthrodesis MEDICAL/REFERRING DIAGNOSIS:  Arthrodesis status [Z98.1]  Cervicalgia [M54.2]   DATE OF ONSET: 2016  REFERRING PHYSICIAN: Alf Palma MD  RETURN PHYSICIAN APPOINTMENT: None Scheduled      ASSESSMENT:  Ms. Dima Lundberg has done well with PT, meeting almost all short term goals listed below. Her average pain has decreased from 6/10 to 1-2/10, and she has improved cervical and B shoulder AROM as noted below. She subjectively reports greater ease with mowing her grass and taking care of her animals. Her NDI score, however, worsened since her initial evaluation. The patient and I agree that her original scores were likely skewed due to how long she had dealt with the pain, but continued manage her house hold. Today, pt c/o more R hip/leg pain. SHe demonstrates signs and symptoms consistent with a tight R ITB. She admits noncompliance with her core trunk and hip strengthening, however, does report she's consistently performed her exercises for neck motion, stretching and posture. I will continue to progress pt toward her unmet short and long term goals as they remain appropriate. PROBLEM LIST (Impacting functional limitations):  1. Decreased Strength  2. Increased Pain  3. Decreased Pacing Skills  4. Decreased Flexibility/Joint Mobility  5. Decreased Knowledge of Precautions  6.  Decreased [place] with Home Exercise Program INTERVENTIONS PLANNED:  1. Balance Exercise  2. Bed Mobility  3. Cold  4. Cryotherapy  5. Electrical Stimulation  6. Heat  7. Home Exercise Program (HEP)  8. Manual Therapy  9. Neuromuscular Re-education/Strengthening  10. Range of Motion (ROM)  11. Therapeutic Exercise/Strengthening  12. Transcutaneous Electrical Nerve Stimulation (TENS)  13. Ultrasound (US)  14. Kinesiotaping   TREATMENT PLAN:  Effective Dates: 8/31/17 TO 11/23/17. Frequency/Duration: 2 times a week for 12 weeks  GOALS: (Goals have been discussed and agreed upon with patient.)  Short-Term Functional Goals: Time Frame: 4-6 weeks (10/12/17)  1. Pt will be compliant and independent with HEP and demonstrating correct, erect posture and log rolling without an increase in pain.--------------------------------------------------------MET  2. Pt will improve NDI score to 3/50 to increase pt's overall functional mobility.-----------------------------------------------------------------------------------------NOT MET, pt scored 7/50 today  3. Pt will improve B shoulder flex/scapt/ABD AROM to 145deg with pain 3-4/10 @ worst to increase pt's ease with reaching/lifting overhead. --------------------------------------------------MET  4. Pt to subjectively report a decrease in pain to 3-4/10 @ worst to increase pt's ease with driving.--------------------------------------------------------------------------------------------------------------MET  5. Pt will improve Cervical AROM to Flex 75%, B SB; 25%; B ROT 25% and Ext: 50% with pain 3-4/10 @ worst to increase pt's ease and safety with driving. MET  6. Pt will subjectively report less pain and greater ease with driving, washing dishes, doing yard work and taking care of her animals. --------------------------------MET  7. Pt will improve cervical strength to 3/5 throughout to aide with pt having correct erect posture with less pain. ----------------------------------------------------------MET  8.  Pt will improve core and hip strength to > 3/5 throughout to prevent LBP and improve balance. ----------------------------------------------------------------------------MET  Discharge Goals: Time Frame: 8-12 weeks (11/23/17)  1. Pt will improve NDI score to 2/50 to increase pt's overall functional mobility. 2. Pt will improve FLR from  CI to G 8985 CI to increase pt's overall functional mobility. 3. Pt will improve cervical AROM to Flex 80%, B SB 50%, B ROT 50% and Ext 75% with manageable pain to increase pt's ease with driving and yard work. 9. Pt will improve B shoulder flex/scapt/ABD AROM to 155deg with manageable pain to allow pt greater ease with [place] activities. 4. Pt will subjectively report a decrease in pain to 2-3/10 @ worst to allow pt greater ease with reaching overhead. 5. Pt will no longer need to wrap a towel around her neck for support while performing yard work, cooking, cleaning and washing dishes. 6. Pt will improve core and hip strength to 3+/5 throughout to increase pt's stability, balance and ease of performing yard work and caring for her animals. 7. Pt will improve cervical strength to 3+/5 throughout to further decrease pt's pain while improving posture. 8. Pt will be discharged from PT to [facility]. The information in this section was collected on 8/31/17 (except where otherwise noted). HISTORY:   History of Present Injury/Illness (Reason for Referral):  Pt reports she's had neck and back pain for years, and she had a lumbar laminectomy /arthrodesis July 2016. She states she's trying to avoid neck surgery. She reports her neck pain/pressure worsens with all of her house/yard work, ADLs, driving, and caring for her animals. She denies taking pain meds and has not had an ANA. She states she uses a towel tied very tight around her neck to aide with unloading and supporting it.  She denies having had PT after her lumbar surgery, and would like to learn exercises that will help improve her posture, neck pain and aide with preventing further injury to her back. Past Medical History/Comorbidities:   Ms. Elizabeth Jarrett  has a past medical history of Arrhythmia; Blindness of right eye; Cancer ([address] (1978); Cardiac murmur; Disc degeneration, lumbar; Fibrocystic disease of breast; GERD (gastroesophageal reflux disease); Hyperlipidemia; Hypertension; Imbalance; Osteoarthritis; Osteoporosis; Sciatica; Spinal stenosis; Squamous cell carcinoma of scalp (2014); TIA (transient ischemic attack) (2008); and Vertigo. She also has no past medical history of Adverse effect of anesthesia; Difficult intubation; Malignant hyperthermia due to anesthesia; Nausea & vomiting; or Pseudocholinesterase deficiency. Ms. Elizabeth Jarrett  has a past surgical history that includes hysterectomy (1969); other surgical (2015); knee replacement (Left, 2007); orthopaedic (Right, 2008); knee replacement (Right, 2009); orthopaedic (2016); mastectomy (Bilateral, 1978); and cataract removal (Bilateral, 2009). Social History/Living Environment:     Pt reports she lives alone in a mobile home on .[place] acres. She states she does her own house/yard work and cares for her animals, but has neck pain. She states difficulty driving and reaching/lifting overhead due to neck and upper back pain. Prior Level of Function/Work/Activity:  Pt states she's completely independent with all activities and wants to keep it that way, but does have pain. Dominant Side:         LEFT    Current Medications:       Current Outpatient Prescriptions:     quinapril (ACCUPRIL) 10 mg tablet, TAKE 1 TAB BY MOUTH EVERY MORNING. INDICATIONS: HYPERTENSION, Disp: 90 Tab, Rfl: 3    metoprolol tartrate (LOPRESSOR) 50 mg tablet, Take 1 Tab by mouth two (2) times a day., Disp: 180 Tab, Rfl: 3    lovastatin (MEVACOR) 20 mg tablet, Take 20 mg by mouth nightly., Disp: , Rfl:     aspirin (ASPIRIN) 325 mg tablet, Take 325 mg by mouth every morning.  Stopped 7/8/16, Disp: , Rfl:    Date Last Reviewed:  8/31/17   EXAMINATION:   Observation/Orthostatic Postural Assessment:          Pt sits/stands with very forward head and rounded shoulders and posterior pelvic tilt. Palpation:          Pt with increased tightness/tenderness noted in B cervical paraspinals, B UT, B levator scap, B anterior scalenes, B SCM and B pec major/minor contributing to her thoracic and cervical pain and lack of mobility. Trigger points: + in B UT (post metcalf), B levator scap  Flexibility: moderate to severe tightness noted in B cervical paraspinals, B UT, B levator scap, B anterior scalenes, B SCM and B pec major/minor. Cervical AROM: % of normal motion in standing  Cervical spine Date:  8/31/17 Date:  10/18/17 Date:     Direction  Parameters Parameters Parameters   Flexion  50% w/ pain 75% no pain    Extension  25% w/ pain 50%  No pain    Rotation  R: 10% w/ pain  L: 20% w/ pain R: 25% no pain  L:50% no pain    Side bending  R: 10% w/ pain  L:20% w/ pain 25% B no pain    Shoulder Flex/Scapt R:135deg  L:135deg 145deg B    Shoulder ABD R:135deg  L:135deg     Shoulder ER/IR R:WNL B  L:WNL B         Strength Date:  8/31/17 Date:  10/18/17 Date:     Cervical/Shoulder Parameters Parameters Parameters   Cervical 2+ to 3-/5 due to poor posture 3/5    Shoulder Flex/Scapt R:3+/5  L:3+/5 3+/5 B    Shoulder ABD R:3/5  L:3/5 3/5 B       Myotomes: Normal and equal B throughout  Diaphragm (C4):  Deltoid/Biceps (C5):  Wrist Extensors (C6):  Triceps (C7):  Flexor Profundus (C8):    Sensation: Normal and equal B throughout  Biceps (C5):   Carbone Radial (C6-C7):  Carbone Ulner (C8-T1):    Special Test/Function:  Compression:+ w/ pain at C5-C6  Distraction:+ w/ decreased pain/heaviness throughout her c-spine  Spurling's maneuver:-B  Accessory mobility:restricted throughout, especially C4-C7     CLINICAL PRESENTATION:   CLINICAL DECISION MAKING:   Outcome Measure:    Tool Used: Neck Disability Index (NDI)  Score:  Initial: 4/50 =8% disability Most Recent: 7/50=14%disability (Date: 10/18/17 )   Interpretation of Score: The Neck Disability Index is a revised form of the Oswestry Low Back Pain Index and is designed to measure the activities of daily living in person's with neck pain. Each section is scored on a 0-5 scale, 5 representing the greatest disability. The scores of each section are added together for a total score of 50. Score 0 1-10 11-20 21-30 31-40 41-49 50   Modifier CH CI CJ CK CL CM CN     ? Carrying, Moving, and Handling Objects:     - CURRENT STATUS: CI - 1%-19% impaired, limited or restricted    - GOAL STATUS: CI - 1%-19% impaired, limited or restricted    - D/C STATUS:  ---------------To be determined---------------       Medical Necessity:   · Patient is expected to demonstrate progress in strength, range of motion, balance, coordination and functional technique to improve safety during driving and decrease pain, as well as, increase ease with lifting/reaching overhead to increase pt's ease with house work, yard work and caring for her animals. .  Reason for Services/Other Comments:  · Patient reports she lives alone in a bad neighborhood. She reports she's completely independent with all of her ADLS, house hold and work activities. She states she has pain and difficulty completing them. She reports she currently ties a towel around her neck for support, and it helps, however she appears weak to her rough neighbors. She states she needs assistance in \"getting rid of\" her neck pain, as well as, education on how to get stronger and improve her posture so that she doesn't appear vulnerable. .            TREATMENT:     Pre-treatment Symptoms/Complaints:  Pt reports little to no neck pain today or over the last week. She c/o R hip soreness and tightness today.    Pain: Initial:     0-1/10neck; 5/10 R ITB Post Session:  0/10 neck; 3/10 R ITB     US: (10minutes): @ 1MHz, 100% ,1.0w/cm2 to B cervical paraspinals, B UT and B levator scap to aide with decreasing pain and tightness, followed by    MANUAL THERAPY: (20minutes): Consisting of STM/MFR to B cervical paraspinals, B UT, B levator scap and B anterior scalenes to aide with improving tissue mobility while decreasing pain. Pt also received seated manual cervical traction as she ccan not yet lie supine due to her nose surgery. THERAPEUTIC EXERCISE: (20 minutes minutes):  Exercises per grid below to improve mobility, strength, balance and coordination. Required moderate visual, verbal and tactile cues to promote proper body alignment, promote proper body posture, promote proper body mechanics and promote proper body breathing techniques. Progressed resistance, range, repetitions and complexity of movement as indicated.      Date:  8/31/17 Date:  9/13/17 Date:  9/18/17 Date:   9/21/17 Date:  9/26/17 Date:  9/28/17 Date:  10/11/17 Date:  10/18/17   Activity/Exercise Parameters Parameters Parameters        Shoulder shrugs 10x   10x       Posterior shoulder rolls 10x 10x  10x       Scapular retraction 10x 10x  10x       Chin tucks (sitting/supine) 10 x 5sec 20 x 5 sec         Cervical rotation to the left 5x          Cervical flexion 5x          Seated UT stretch 2 x 20sec          Stdg drwy pec stretch 1 x 20sec 2 x 20sec         LTR 10x 20x      20x   SKTC        5x B   Dural stretches/nerve flossing        5x B   TAs w/ pelvic tilt 10x 5sec 20 x 5sec      20 x 8sec   Bridging w/ TAs, pelvic tilt and add sq  20x      20 x 5sec   Supine hip ABD w/ tband  OTB, 20x         Stdg B shoulder extension w/tband   YTB, 20x YTB, 20x  YTB, 15x     Stdg B rowing w/ tband   YTB, 20x YTB, 20x  YTB, 15x     Stdg B horizontal ABD w/tbans   YTB, 20x YTB, 20x  YTB, 15x     Stdg flex AROM      15x     Stdg scapt AROM      15x     Stdg ABD AROM      15x     Kinesiotape     done      Stick to R ITB        6\"   STM/MFR to C-spine/UT  done done done done Done as above Done as above Done as above   Cervical Traction/SOC release 5\" done done done done Done as above Done as above Done as above   Education HEP, scapular retraction/depression/TAs TAs/glute sq w/ all standing/lifting; proper lifting techniques HEP for proper posture with scap retraction and depression HEP for proper posture w/ scap retraction and depression HEP for posture HEP for AROM, stretching and posture HEP for AROM, stretching nd posture as able HEP for log rolling and core strengthening and stretching     Treatment/Session Assessment: see above  · Response to Treatment:  Pt did well with above exercises, therefore I added those to her HEP. · Compliance with Program/Exercises: Pt reports compliance with cervical exercises, but not lumbar exercises. She was encouraged to try lumbar exercises prior to returning to PT due to R hip/leg pain. Recommendations/Intent for next treatment session: \"Continue with PT to improve cervical and B UE AROM and strength, as well as, core strength to decrease cervical and lumbar pain. Manual techniques and modalities should be utilized if pt continues to respond well to them. Progress mid back and abdominal strengthening as pt is willing and able. \".   Future Appointments  Date Time Provider Sunny Burden   10/23/2017 2:00 PM [address], PT [facility]   10/25/2017 2:00 PM [address], PT [facility]   1/19/2018 10:45 AM Marco Bar MD [facility]     Total Treatment Duration:  PT Patient Time In/Time Out  Time In: 0710  Time Out: [address], PT

## 2017-10-23 ENCOUNTER — HOSPITAL ENCOUNTER (OUTPATIENT)
Dept: PHYSICAL THERAPY | Age: 77
Discharge: HOME | End: 2017-10-23
Payer: MEDICARE

## 2017-10-23 PROCEDURE — 97140 MANUAL THERAPY 1/> REGIONS: CPT | Performed by: PHYSICAL THERAPIST

## 2017-10-23 PROCEDURE — 97035 APP MDLTY 1+ULTRASOUND EA 15: CPT | Performed by: PHYSICAL THERAPIST

## 2017-10-23 PROCEDURE — 97110 THERAPEUTIC EXERCISES: CPT | Performed by: PHYSICAL THERAPIST

## 2017-10-23 NOTE — PROGRESS NOTES
Tasneem Coleman Mulliken  : 1940   [facility] [address] at [facility]. [address], [place], [address] [address]  [phone]   [phone]         OUTPATIENT PHYSICAL THERAPY:Daily Note 10/23/2017    ICD-10: Treatment Diagnosis: (M54.2) Cervicalgia; (M62.81) Muscle weakness; (Z98.1) Arthrodesis L-spine,   Precautions/Allergies:   Review of patient's allergies indicates no known allergies. Fall Risk Score: 1 (? 5 = High Risk)  MD Orders: Evaluate and Treat current cervicalgia and core strengthening s/p arthrodesis MEDICAL/REFERRING DIAGNOSIS:  Arthrodesis status [Z98.1]  Cervicalgia [M54.2]   DATE OF ONSET: 2016  REFERRING PHYSICIAN: Becky Denis MD  RETURN PHYSICIAN APPOINTMENT: None Scheduled      ASSESSMENT:  Ms. Marsh Mohs has done well with PT, meeting almost all short term goals listed below. Her average pain has decreased from 6/10 to 1-2/10, and she has improved cervical and B shoulder AROM as noted below. She subjectively reports greater ease with mowing her grass and taking care of her animals. Her NDI score, however, worsened since her initial evaluation. The patient and I agree that her original scores were likely skewed due to how long she had dealt with the pain, but continued manage her house hold. Today, pt c/o more R hip/leg pain. SHe demonstrates signs and symptoms consistent with a tight R ITB. She admits noncompliance with her core trunk and hip strengthening, however, does report she's consistently performed her exercises for neck motion, stretching and posture. I will continue to progress pt toward her unmet short and long term goals as they remain appropriate. PROBLEM LIST (Impacting functional limitations):  1. Decreased Strength  2. Increased Pain  3. Decreased Pacing Skills  4. Decreased Flexibility/Joint Mobility  5. Decreased Knowledge of Precautions  6.  Decreased [place] with Home Exercise Program INTERVENTIONS PLANNED:  1. Balance Exercise  2. Bed Mobility  3. Cold  4. Cryotherapy  5. Electrical Stimulation  6. Heat  7. Home Exercise Program (HEP)  8. Manual Therapy  9. Neuromuscular Re-education/Strengthening  10. Range of Motion (ROM)  11. Therapeutic Exercise/Strengthening  12. Transcutaneous Electrical Nerve Stimulation (TENS)  13. Ultrasound (US)  14. Kinesiotaping   TREATMENT PLAN:  Effective Dates: 8/31/17 TO 11/23/17. Frequency/Duration: 2 times a week for 12 weeks  GOALS: (Goals have been discussed and agreed upon with patient.)  Short-Term Functional Goals: Time Frame: 4-6 weeks (10/12/17)  1. Pt will be compliant and independent with HEP and demonstrating correct, erect posture and log rolling without an increase in pain.--------------------------------------------------------MET  2. Pt will improve NDI score to 3/50 to increase pt's overall functional mobility.-----------------------------------------------------------------------------------------NOT MET, pt scored 7/50 today  3. Pt will improve B shoulder flex/scapt/ABD AROM to 145deg with pain 3-4/10 @ worst to increase pt's ease with reaching/lifting overhead. --------------------------------------------------MET  4. Pt to subjectively report a decrease in pain to 3-4/10 @ worst to increase pt's ease with driving.--------------------------------------------------------------------------------------------------------------MET  5. Pt will improve Cervical AROM to Flex 75%, B SB; 25%; B ROT 25% and Ext: 50% with pain 3-4/10 @ worst to increase pt's ease and safety with driving. MET  6. Pt will subjectively report less pain and greater ease with driving, washing dishes, doing yard work and taking care of her animals. --------------------------------MET  7. Pt will improve cervical strength to 3/5 throughout to aide with pt having correct erect posture with less pain. ----------------------------------------------------------MET  8.  Pt will improve core and hip strength to > 3/5 throughout to prevent LBP and improve balance. ----------------------------------------------------------------------------MET  Discharge Goals: Time Frame: 8-12 weeks (11/23/17)  1. Pt will improve NDI score to 2/50 to increase pt's overall functional mobility. 2. Pt will improve FLR from  CI to G 8985 CI to increase pt's overall functional mobility. 3. Pt will improve cervical AROM to Flex 80%, B SB 50%, B ROT 50% and Ext 75% with manageable pain to increase pt's ease with driving and yard work. 9. Pt will improve B shoulder flex/scapt/ABD AROM to 155deg with manageable pain to allow pt greater ease with [place] activities. 4. Pt will subjectively report a decrease in pain to 2-3/10 @ worst to allow pt greater ease with reaching overhead. 5. Pt will no longer need to wrap a towel around her neck for support while performing yard work, cooking, cleaning and washing dishes. 6. Pt will improve core and hip strength to 3+/5 throughout to increase pt's stability, balance and ease of performing yard work and caring for her animals. 7. Pt will improve cervical strength to 3+/5 throughout to further decrease pt's pain while improving posture. 8. Pt will be discharged from PT to [facility]. The information in this section was collected on 8/31/17 (except where otherwise noted). HISTORY:   History of Present Injury/Illness (Reason for Referral):  Pt reports she's had neck and back pain for years, and she had a lumbar laminectomy /arthrodesis July 2016. She states she's trying to avoid neck surgery. She reports her neck pain/pressure worsens with all of her house/yard work, ADLs, driving, and caring for her animals. She denies taking pain meds and has not had an ANA. She states she uses a towel tied very tight around her neck to aide with unloading and supporting it.  She denies having had PT after her lumbar surgery, and would like to learn exercises that will help improve her posture, neck pain and aide with preventing further injury to her back. Past Medical History/Comorbidities:   Ms. Arianna Bill  has a past medical history of Arrhythmia; Blindness of right eye; Cancer ([address] (1978); Cardiac murmur; Disc degeneration, lumbar; Fibrocystic disease of breast; GERD (gastroesophageal reflux disease); Hyperlipidemia; Hypertension; Imbalance; Osteoarthritis; Osteoporosis; Sciatica; Spinal stenosis; Squamous cell carcinoma of scalp (2014); TIA (transient ischemic attack) (2008); and Vertigo. She also has no past medical history of Adverse effect of anesthesia; Difficult intubation; Malignant hyperthermia due to anesthesia; Nausea & vomiting; or Pseudocholinesterase deficiency. Ms. Arianna Bill  has a past surgical history that includes hysterectomy (1969); other surgical (2015); knee replacement (Left, 2007); orthopaedic (Right, 2008); knee replacement (Right, 2009); orthopaedic (2016); mastectomy (Bilateral, 1978); and cataract removal (Bilateral, 2009). Social History/Living Environment:     Pt reports she lives alone in a mobile home on .[place] acres. She states she does her own house/yard work and cares for her animals, but has neck pain. She states difficulty driving and reaching/lifting overhead due to neck and upper back pain. Prior Level of Function/Work/Activity:  Pt states she's completely independent with all activities and wants to keep it that way, but does have pain. Dominant Side:         LEFT    Current Medications:       Current Outpatient Prescriptions:     quinapril (ACCUPRIL) 10 mg tablet, TAKE 1 TAB BY MOUTH EVERY MORNING. INDICATIONS: HYPERTENSION, Disp: 90 Tab, Rfl: 3    metoprolol tartrate (LOPRESSOR) 50 mg tablet, Take 1 Tab by mouth two (2) times a day., Disp: 180 Tab, Rfl: 3    lovastatin (MEVACOR) 20 mg tablet, Take 20 mg by mouth nightly., Disp: , Rfl:     aspirin (ASPIRIN) 325 mg tablet, Take 325 mg by mouth every morning.  Stopped 7/8/16, Disp: , Rfl:    Date Last Reviewed:  8/31/17   EXAMINATION:   Observation/Orthostatic Postural Assessment:          Pt sits/stands with very forward head and rounded shoulders and posterior pelvic tilt. Palpation:          Pt with increased tightness/tenderness noted in B cervical paraspinals, B UT, B levator scap, B anterior scalenes, B SCM and B pec major/minor contributing to her thoracic and cervical pain and lack of mobility. Trigger points: + in B UT (post metcalf), B levator scap  Flexibility: moderate to severe tightness noted in B cervical paraspinals, B UT, B levator scap, B anterior scalenes, B SCM and B pec major/minor. Cervical AROM: % of normal motion in standing  Cervical spine Date:  8/31/17 Date:  10/18/17 Date:     Direction  Parameters Parameters Parameters   Flexion  50% w/ pain 75% no pain    Extension  25% w/ pain 50%  No pain    Rotation  R: 10% w/ pain  L: 20% w/ pain R: 25% no pain  L:50% no pain    Side bending  R: 10% w/ pain  L:20% w/ pain 25% B no pain    Shoulder Flex/Scapt R:135deg  L:135deg 145deg B    Shoulder ABD R:135deg  L:135deg     Shoulder ER/IR R:WNL B  L:WNL B         Strength Date:  8/31/17 Date:  10/18/17 Date:     Cervical/Shoulder Parameters Parameters Parameters   Cervical 2+ to 3-/5 due to poor posture 3/5    Shoulder Flex/Scapt R:3+/5  L:3+/5 3+/5 B    Shoulder ABD R:3/5  L:3/5 3/5 B       Myotomes: Normal and equal B throughout  Diaphragm (C4):  Deltoid/Biceps (C5):  Wrist Extensors (C6):  Triceps (C7):  Flexor Profundus (C8):    Sensation: Normal and equal B throughout  Biceps (C5):   Carbone Radial (C6-C7):  Carbone Ulner (C8-T1):    Special Test/Function:  Compression:+ w/ pain at C5-C6  Distraction:+ w/ decreased pain/heaviness throughout her c-spine  Spurling's maneuver:-B  Accessory mobility:restricted throughout, especially C4-C7     CLINICAL PRESENTATION:   CLINICAL DECISION MAKING:   Outcome Measure:    Tool Used: Neck Disability Index (NDI)  Score:  Initial: 4/50 =8% disability Most Recent: 7/50=14%disability (Date: 10/18/17 )   Interpretation of Score: The Neck Disability Index is a revised form of the Oswestry Low Back Pain Index and is designed to measure the activities of daily living in person's with neck pain. Each section is scored on a 0-5 scale, 5 representing the greatest disability. The scores of each section are added together for a total score of 50. Score 0 1-10 11-20 21-30 31-40 41-49 50   Modifier CH CI CJ CK CL CM CN     ? Carrying, Moving, and Handling Objects:     - CURRENT STATUS: CI - 1%-19% impaired, limited or restricted    - GOAL STATUS: CI - 1%-19% impaired, limited or restricted    - D/C STATUS:  ---------------To be determined---------------       Medical Necessity:   · Patient is expected to demonstrate progress in strength, range of motion, balance, coordination and functional technique to improve safety during driving and decrease pain, as well as, increase ease with lifting/reaching overhead to increase pt's ease with house work, yard work and caring for her animals. .  Reason for Services/Other Comments:  · Patient reports she lives alone in a bad neighborhood. She reports she's completely independent with all of her ADLS, house hold and work activities. She states she has pain and difficulty completing them. She reports she currently ties a towel around her neck for support, and it helps, however she appears weak to her rough neighbors. She states she needs assistance in \"getting rid of\" her neck pain, as well as, education on how to get stronger and improve her posture so that she doesn't appear vulnerable. .            TREATMENT:     Pre-treatment Symptoms/Complaints:  Pt reports no neck pain and only minimal R lateral upper leg and hip pain today.    Pain: Initial:     1-2/10 R ITB Post Session:  1/10 R ITB     US: (10minutes): @ 1MHz, 100% ,1.0w/cm2 to B cervical paraspinals, B UT and B levator scap to aide with decreasing pain and tightness, followed by    MANUAL THERAPY: (30minutes): Consisting of STM/MFR to B cervical paraspinals, B UT, B levator scap and B anterior scalenes to aide with improving tissue mobility while decreasing pain. Pt also received seated manual cervical traction as she ccan not yet lie supine due to her nose surgery. THERAPEUTIC EXERCISE: (15 minutes minutes):  Exercises per grid below to improve mobility, strength, balance and coordination. Required moderate visual, verbal and tactile cues to promote proper body alignment, promote proper body posture, promote proper body mechanics and promote proper body breathing techniques. Progressed resistance, range, repetitions and complexity of movement as indicated.      Date:   9/21/17 Date:  9/26/17 Date:  9/28/17 Date:  10/11/17 Date:  10/18/17 Date:  10/23/17   Activity/Exercise         Shoulder shrugs 10x        Posterior shoulder rolls 10x        Scapular retraction 10x        Chin tucks (sitting/supine)         Cervical rotation to the left         Cervical flexion         Seated UT stretch         Stdg drwy pec stretch         LTR     20x 20x   SKTC     5x B 5x B   Dural stretches/nerve flossing     5x B 5x B   TAs w/ pelvic tilt     20 x 8sec 20x 8sec   Bridging w/ TAs, pelvic tilt and add sq     20 x 5sec    Supine hip ABD w/ tband         Stdg B shoulder extension w/tband YTB, 20x  YTB, 15x   YTB, 30x   Stdg B rowing w/ tband YTB, 20x  YTB, 15x   YTB, 30x   Stdg B horizontal ABD w/tbans YTB, 20x  YTB, 15x   YTB, 30x   Stdg flex AROM   15x      Stdg scapt AROM   15x      Stdg ABD AROM   15x      Kinesiotape  done       Stick to R ITB     6\" 4\"   STM/MFR to C-spine/UT done done Done as above Done as above Done as above Done as noted above   Cervical Traction/SOC release done done Done as above Done as above Done as above Done as above   Education HEP for proper posture w/ scap retraction and depression HEP for posture HEP for AROM, stretching and posture HEP for AROM, stretching nd posture as able HEP for log rolling and core strengthening and stretching HEP for posture and log rolling. Treatment/Session Assessment: Pt with tightness and increased tissue density in mid R ITB with use of the stick, however, she subjectively reported less pain with use of the stick today. R side cervical and UT tightness remains , however, she was significantly less painful with manual therapy (STM/MFR) and SOC release today. · Response to Treatment:  Pt did well with addition of bridging w/ TAs and ball sq, therefore I added those to her HEP. · Compliance with Program/Exercises: Pt reports compliance with both cervical and lumbar exercises 1x daily since last PT visit. Recommendations/Intent for next treatment session: \"Continue with PT to improve cervical and B UE AROM and strength, as well as, core strength to decrease cervical and lumbar pain. Manual techniques and modalities should be utilized if pt continues to respond well to them. Progress mid back and abdominal strengthening as pt is willing and able.      Future Appointments  Date Time Provider Sunny Burden   10/25/2017 2:00 PM [address] [facility]   1/19/2018 10:45 AM Raina Zuniga MD [facility]     Total Treatment Duration:  PT Patient Time In/Time Out  Time In: 1405  Time Out: [address], PT

## 2017-10-25 ENCOUNTER — HOSPITAL ENCOUNTER (OUTPATIENT)
Dept: PHYSICAL THERAPY | Age: 77
Discharge: HOME | End: 2017-10-25
Payer: MEDICARE

## 2017-10-25 PROCEDURE — 97140 MANUAL THERAPY 1/> REGIONS: CPT | Performed by: PHYSICAL THERAPIST

## 2017-10-25 PROCEDURE — G8986 CARRY D/C STATUS: HCPCS | Performed by: PHYSICAL THERAPIST

## 2017-10-25 PROCEDURE — G8985 CARRY GOAL STATUS: HCPCS | Performed by: PHYSICAL THERAPIST

## 2017-10-25 PROCEDURE — 97035 APP MDLTY 1+ULTRASOUND EA 15: CPT | Performed by: PHYSICAL THERAPIST

## 2017-10-25 NOTE — PROGRESS NOTES
[facility]  : 1940   [address]. [address], [place], [address] [address]  [phone]   [phone]         OUTPATIENT PHYSICAL THERAPY:Daily Note and Discharge 10/25/2017    ICD-10: Treatment Diagnosis: (M54.2) Cervicalgia; (M62.81) Muscle weakness; (Z98.1) Arthrodesis L-spine,   Precautions/Allergies:   Review of patient's allergies indicates no known allergies. Fall Risk Score: 1 (? 5 = High Risk)  MD Orders: Evaluate and Treat current cervicalgia and core strengthening s/p arthrodesis MEDICAL/REFERRING DIAGNOSIS:  Arthrodesis status [Z98.1]  Cervicalgia [M54.2]   DATE OF ONSET: 2016  REFERRING PHYSICIAN: Madison Cuello MD  RETURN PHYSICIAN APPOINTMENT: None Scheduled      ASSESSMENT: Severa Lee has been seen in physical therapy from 17 to 10/25/17 for 10 visits. Treatment has been discontinued at this time due to pt feeling like she is doing well enough to continue on her own. .  The below goals were met prior to discontinuation. Some goals were not met due to pt discontinuing treatment ~ 4 weeks prior to her POC expiration. Her cervical and B UE AROM, strength and pain have improved tremendously, and she is compliant and independent with her HEP. Her NDI score did not improve, and in fact worsened, however, I believe it's more due to improved body awareness with ADLs and house hold activities. She admits greater ease with performing her yard work, as well as, caring for her animals. She is now also compliant with a core trunk AROM and strengthening program as well. She feels confident that she can continue on her own at this point. Pt is DC'd from PT to HEP. Thank you for this referral.      PROBLEM LIST (Impacting functional limitations):  1. Decreased Strength  2. Increased Pain  3. Decreased Pacing Skills  4. Decreased Flexibility/Joint Mobility  5.  Decreased Knowledge of Precautions  6. Decreased [place] with Home Exercise Program INTERVENTIONS PLANNED:  1. Balance Exercise  2. Bed Mobility  3. Cold  4. Cryotherapy  5. Electrical Stimulation  6. Heat  7. Home Exercise Program (HEP)  8. Manual Therapy  9. Neuromuscular Re-education/Strengthening  10. Range of Motion (ROM)  11. Therapeutic Exercise/Strengthening  12. Transcutaneous Electrical Nerve Stimulation (TENS)  13. Ultrasound (US)  14. Kinesiotaping   TREATMENT PLAN:  Effective Dates: 8/31/17 TO 11/23/17. Frequency/Duration: 2 times a week for 12 weeks  GOALS: (Goals have been discussed and agreed upon with patient.)  Short-Term Functional Goals: Time Frame: 4-6 weeks (10/12/17)  1. Pt will be compliant and independent with HEP and demonstrating correct, erect posture and log rolling without an increase in pain.--------------------------------------------------------MET  2. Pt will improve NDI score to 3/50 to increase pt's overall functional mobility.-----------------------------------------------------------------------------------------NOT MET, pt scored 7/50 today  3. Pt will improve B shoulder flex/scapt/ABD AROM to 145deg with pain 3-4/10 @ worst to increase pt's ease with reaching/lifting overhead. --------------------------------------------------MET  4. Pt to subjectively report a decrease in pain to 3-4/10 @ worst to increase pt's ease with driving.--------------------------------------------------------------------------------------------------------------MET  5. Pt will improve Cervical AROM to Flex 75%, B SB; 25%; B ROT 25% and Ext: 50% with pain 3-4/10 @ worst to increase pt's ease and safety with driving. MET  6. Pt will subjectively report less pain and greater ease with driving, washing dishes, doing yard work and taking care of her animals. --------------------------------MET  7. Pt will improve cervical strength to 3/5 throughout to aide with pt having correct erect posture with less pain. ----------------------------------------------------------MET  8. Pt will improve core and hip strength to > 3/5 throughout to prevent LBP and improve balance. ----------------------------------------------------------------------------MET  Discharge Goals: Time Frame: 8-12 weeks (11/23/17)  1. Pt will improve NDI score to 2/50 to increase pt's overall functional mobility.--------------------------------------------------------------------------------------------------NOT MET, see below  2. Pt will improve FLR from  CI to G 8985 CI to increase pt's overall functional mobility.-----------------------------------------------------------------------------MET  3. Pt will improve cervical AROM to Flex 80%, B SB 50%, B ROT 50% and Ext 75% with manageable pain to increase pt's ease with driving and yard work. Partially MET, see below  9. Pt will improve B shoulder flex/scapt/ABD AROM to 155deg with manageable pain to allow pt greater ease with [place] activities. -------MET  4. Pt will subjectively report a decrease in pain to 2-3/10 @ worst to allow pt greater ease with reaching overhead. --------------------MET  5. Pt will no longer need to wrap a towel around her neck for support while performing yard work, cooking, cleaning and washing dishes. --------------------------------MET  6. Pt will improve core and hip strength to 3+/5 throughout to increase pt's stability, balance and ease of performing yard work and caring for her animals. ---------------------------MET  7. Pt will improve cervical strength to 3+/5 throughout to further decrease pt's pain while improving posture. ------------------------------------------------------------------------------------------------------MET  8.  Pt will be discharged from PT to HEP.---------------------------------------------------------------------------------------------------------------------------------------------------------------------------NOT MET              The information in this section was collected on 8/31/17 (except where otherwise noted). HISTORY:   History of Present Injury/Illness (Reason for Referral):  Pt reports she's had neck and back pain for years, and she had a lumbar laminectomy /arthrodesis July 2016. She states she's trying to avoid neck surgery. She reports her neck pain/pressure worsens with all of her house/yard work, ADLs, driving, and caring for her animals. She denies taking pain meds and has not had an ANA. She states she uses a towel tied very tight around her neck to aide with unloading and supporting it. She denies having had PT after her lumbar surgery, and would like to learn exercises that will help improve her posture, neck pain and aide with preventing further injury to her back. Past Medical History/Comorbidities:   Ms. Bryn Cranker  has a past medical history of Arrhythmia; Blindness of right eye; Cancer ([facility] [address] (1978); Cardiac murmur; Disc degeneration, lumbar; Fibrocystic disease of breast; GERD (gastroesophageal reflux disease); Hyperlipidemia; Hypertension; Imbalance; Osteoarthritis; Osteoporosis; Sciatica; Spinal stenosis; Squamous cell carcinoma of scalp (2014); TIA (transient ischemic attack) (2008); and Vertigo. She also has no past medical history of Adverse effect of anesthesia; Difficult intubation; Malignant hyperthermia due to anesthesia; Nausea & vomiting; or Pseudocholinesterase deficiency. Ms. Bryn Cranker  has a past surgical history that includes hysterectomy (1969); other surgical (2015); knee replacement (Left, 2007); orthopaedic (Right, 2008); knee replacement (Right, 2009); orthopaedic (2016); mastectomy (Bilateral, 1978); and cataract removal (Bilateral, 2009). Social History/Living Environment:     Pt reports she lives alone in a mobile home on .[place] acres. She states she does her own house/yard work and cares for her animals, but has neck pain.  She states difficulty driving and reaching/lifting overhead due to neck and upper back pain.   Prior Level of Function/Work/Activity:  Pt states she's completely independent with all activities and wants to keep it that way, but does have pain. Dominant Side:         LEFT    Current Medications:       Current Outpatient Prescriptions:     quinapril (ACCUPRIL) 10 mg tablet, TAKE 1 TAB BY MOUTH EVERY MORNING. INDICATIONS: HYPERTENSION, Disp: 90 Tab, Rfl: 3    metoprolol tartrate (LOPRESSOR) 50 mg tablet, Take 1 Tab by mouth two (2) times a day., Disp: 180 Tab, Rfl: 3    lovastatin (MEVACOR) 20 mg tablet, Take 20 mg by mouth nightly., Disp: , Rfl:     aspirin (ASPIRIN) 325 mg tablet, Take 325 mg by mouth every morning. Stopped 7/8/16, Disp: , Rfl:    Date Last Reviewed:  8/31/17   EXAMINATION:   Observation/Orthostatic Postural Assessment:          Pt sits/stands with very forward head and rounded shoulders and posterior pelvic tilt. Palpation:          Pt with increased tightness/tenderness noted in B cervical paraspinals, B UT, B levator scap, B anterior scalenes, B SCM and B pec major/minor contributing to her thoracic and cervical pain and lack of mobility. Trigger points: + in B UT (post metcalf), B levator scap  Flexibility: moderate to severe tightness noted in B cervical paraspinals, B UT, B levator scap, B anterior scalenes, B SCM and B pec major/minor.    Cervical AROM: % of normal motion in standing  Cervical spine Date:  8/31/17 Date:  10/18/17 Date:  10/25/17   Direction  Parameters Parameters Parameters   Flexion  50% w/ pain 75% no pain 80% no pain   Extension  25% w/ pain 50%  No pain 75% no pain   Rotation  R: 10% w/ pain  L: 20% w/ pain R: 25% no pain  L:50% no pain 50% B no pain   Side bending  R: 10% w/ pain  L:20% w/ pain 25% B no pain 35% no pain   Shoulder Flex/Scapt R:135deg  L:135deg 145deg B 155deg B   Shoulder ABD R:135deg  L:135deg     Shoulder ER/IR R:WNL B  L:WNL B         Strength Date:  8/31/17 Date:  10/18/17 Date:  10/25/17   Cervical/Shoulder Parameters Parameters Parameters   Cervical 2+ to 3-/5 due to poor posture 3/5    Shoulder Flex/Scapt R:3+/5  L:3+/5 3+/5 B    Shoulder ABD R:3/5  L:3/5 3/5 B       Myotomes: Normal and equal B throughout  Diaphragm (C4):  Deltoid/Biceps (C5):  Wrist Extensors (C6):  Triceps (C7):  Flexor Profundus (C8):    Sensation: Normal and equal B throughout  Biceps (C5):   Carbone Radial (C6-C7):  Carbone Ulner (C8-T1):    Special Test/Function:  Compression:+ w/ pain at C5-C6  Distraction:+ w/ decreased pain/heaviness throughout her c-spine  Spurling's maneuver:-B  Accessory mobility:restricted throughout, especially C4-C7     CLINICAL PRESENTATION:   CLINICAL DECISION MAKING:   Outcome Measure: Tool Used: Neck Disability Index (NDI)  Score:  Initial: 4/50 =8% disability Most Recent: 7/50=14%disability (Date: 10/18/17 )   Interpretation of Score: The Neck Disability Index is a revised form of the Oswestry Low Back Pain Index and is designed to measure the activities of daily living in person's with neck pain. Each section is scored on a 0-5 scale, 5 representing the greatest disability. The scores of each section are added together for a total score of 50. Score 0 1-10 11-20 21-30 31-40 41-49 50   Modifier CH CI CJ CK CL CM CN     ? Carrying, Moving, and Handling Objects:     - CURRENT STATUS:     - GOAL STATUS: CI - 1%-19% impaired, limited or restricted    - D/C STATUS:  CI - 1%-19% impaired, limited or restricted       Medical Necessity:   · Patient is expected to demonstrate progress in strength, range of motion, balance, coordination and functional technique to improve safety during driving and decrease pain, as well as, increase ease with lifting/reaching overhead to increase pt's ease with house work, yard work and caring for her animals. .  Reason for Services/Other Comments:  · Patient reports she lives alone in a bad neighborhood.  She reports she's completely independent with all of her ADLS, house hold and work activities. She states she has pain and difficulty completing them. She reports she currently ties a towel around her neck for support, and it helps, however she appears weak to her rough neighbors. She states she needs assistance in \"getting rid of\" her neck pain, as well as, education on how to get stronger and improve her posture so that she doesn't appear vulnerable. .            TREATMENT:     Pre-treatment Symptoms/Complaints:  Pt c/o R side neck and UT tightness, as well as, R ITB tightness today, but no pain. Pain: Initial:     0/10 Post Session:  0/10     US: (10minutes): @ 1MHz, 100% ,1.0w/cm2 to B cervical paraspinals, B UT and B levator scap to aide with decreasing pain and tightness, followed by    MANUAL THERAPY: (35minutes): Consisting of STM/MFR to B cervical paraspinals, B UT, B levator scap and B anterior scalenes to aide with improving tissue mobility while decreasing pain. Pt also received seated manual cervical traction as she ccan not yet lie supine due to her nose surgery. THERAPEUTIC EXERCISE: (5 minutes minutes):  Exercises per grid below to improve mobility, strength, balance and coordination. Required moderate visual, verbal and tactile cues to promote proper body alignment, promote proper body posture, promote proper body mechanics and promote proper body breathing techniques. Progressed resistance, range, repetitions and complexity of movement as indicated.      Date:   9/21/17 Date:  9/26/17 Date:  9/28/17 Date:  10/11/17 Date:  10/18/17 Date:  10/23/17 Date:  10/25/17   Activity/Exercise          Shoulder shrugs 10x         Posterior shoulder rolls 10x         Scapular retraction 10x         Chin tucks (sitting/supine)          Cervical rotation to the left          Cervical flexion          Seated UT stretch          Stdg drwy pec stretch          LTR     20x 20x    SKTC     5x B 5x B    Dural stretches/nerve flossing     5x B 5x B    TAs w/ pelvic tilt     20 x 8sec 20x 8sec Bridging w/ TAs, pelvic tilt and add sq     20 x 5sec     Supine hip ABD w/ tband          Stdg B shoulder extension w/tband YTB, 20x  YTB, 15x   YTB, 30x    Stdg B rowing w/ tband YTB, 20x  YTB, 15x   YTB, 30x    Stdg B horizontal ABD w/tbans YTB, 20x  YTB, 15x   YTB, 30x    Stdg flex AROM   15x       Stdg scapt AROM   15x       Stdg ABD AROM   15x       Kinesiotape  done        Review of HEP       5\"   Stick to R ITB     6\" 4\" 6\"   STM/MFR to C-spine/UT done done Done as above Done as above Done as above Done as noted above Done as noted above   Cervical Traction/SOC release done done Done as above Done as above Done as above Done as above Done as noted above   Education HEP for proper posture w/ scap retraction and depression HEP for posture HEP for AROM, stretching and posture HEP for AROM, stretching nd posture as able HEP for log rolling and core strengthening and stretching HEP for posture and log rolling. HEP for posture/log rolling     Treatment/Session Assessment: see above  · Response to Treatment:  Pt subjectively reported less R side neck and ITB tightness after treatment today. · Compliance with Program/Exercises: Pt reports compliance with both cervical and lumbar exercises 1x daily since last PT visit. Recommendations: Pt is DC'd from PT to HEP at this time.     Future Appointments  Date Time Provider Sunny Jenise   1/19/2018 10:45 AM Brice Stevenson MD [facility]     Total Treatment Duration:  PT Patient Time In/Time Out  Time In: 1400  Time Out: [address] [facility] [address], PT

## 2018-07-28 PROBLEM — Z91.81 RISK FOR FALLS: Status: ACTIVE | Noted: 2018-07-28

## 2018-07-28 PROBLEM — G89.29 CHRONIC PAIN OF RIGHT KNEE: Status: ACTIVE | Noted: 2018-07-28

## 2018-07-28 PROBLEM — R01.1 CARDIAC MURMUR: Status: ACTIVE | Noted: 2018-07-28

## 2018-07-28 PROBLEM — M25.561 CHRONIC PAIN OF RIGHT KNEE: Status: ACTIVE | Noted: 2018-07-28

## 2019-03-07 ENCOUNTER — HOSPITAL ENCOUNTER (OUTPATIENT)
Dept: MRI IMAGING | Age: 79
Discharge: HOME | End: 2019-03-07
Attending: NURSE PRACTITIONER
Payer: MEDICARE

## 2019-03-07 DIAGNOSIS — G45.9 TIA (TRANSIENT ISCHEMIC ATTACK): ICD-10-CM

## 2019-03-07 DIAGNOSIS — H53.9 VISUAL CHANGES: ICD-10-CM

## 2019-03-07 DIAGNOSIS — R51.9 ACUTE INTRACTABLE HEADACHE, UNSPECIFIED HEADACHE TYPE: ICD-10-CM

## 2019-03-07 DIAGNOSIS — R53.83 FATIGUE, UNSPECIFIED TYPE: ICD-10-CM

## 2019-03-07 DIAGNOSIS — H53.8 BLURRED VISION: ICD-10-CM

## 2019-03-07 PROBLEM — R42 DIZZINESSES: Status: ACTIVE | Noted: 2019-03-07

## 2019-03-07 PROCEDURE — 70551 MRI BRAIN STEM W/O DYE: CPT

## 2019-03-08 NOTE — PROGRESS NOTES
Spoke with patient this morning. States she is feeling \"normal.\" No headache, no fatigue or strange sensations down body. Plans to follow-up with Dr. Oneil Cedeño on Tues March 12. Rescheduled cardiology appoint until March 18.

## 2019-10-08 ENCOUNTER — HOSPITAL ENCOUNTER (OUTPATIENT)
Dept: GENERAL RADIOLOGY | Age: 79
Discharge: HOME | End: 2019-10-08
Attending: INTERNAL MEDICINE

## 2019-10-08 DIAGNOSIS — M25.551 PAIN OF RIGHT HIP JOINT: ICD-10-CM

## 2019-10-12 PROBLEM — R41.3 MEMORY CHANGES: Status: ACTIVE | Noted: 2019-10-12

## 2019-10-12 PROBLEM — M25.551 PAIN OF RIGHT HIP JOINT: Status: ACTIVE | Noted: 2019-10-12

## 2019-10-12 PROBLEM — G25.2 TREMOR, COARSE: Status: ACTIVE | Noted: 2019-10-12

## 2019-10-12 PROBLEM — H93.19 TINNITUS: Status: ACTIVE | Noted: 2019-10-12

## 2019-12-21 ENCOUNTER — HOSPITAL ENCOUNTER (EMERGENCY)
Age: 79
Discharge: HOME | End: 2019-12-21
Attending: EMERGENCY MEDICINE
Payer: MEDICARE

## 2019-12-21 VITALS
DIASTOLIC BLOOD PRESSURE: 91 MMHG | BODY MASS INDEX: 24.75 KG/M2 | HEIGHT: 66 IN | TEMPERATURE: 98 F | WEIGHT: 154 LBS | SYSTOLIC BLOOD PRESSURE: 173 MMHG | OXYGEN SATURATION: 100 % | HEART RATE: 74 BPM | RESPIRATION RATE: 16 BRPM

## 2019-12-21 DIAGNOSIS — M79.604 PAIN OF RIGHT LOWER EXTREMITY: Primary | ICD-10-CM

## 2019-12-21 PROCEDURE — 99283 EMERGENCY DEPT VISIT LOW MDM: CPT | Performed by: EMERGENCY MEDICINE

## 2019-12-21 PROCEDURE — 74011250637 HC RX REV CODE- 250/637: Performed by: EMERGENCY MEDICINE

## 2019-12-21 RX ORDER — NAPROXEN 250 MG/1
250 TABLET ORAL
Status: COMPLETED | OUTPATIENT
Start: 2019-12-21 | End: 2019-12-21

## 2019-12-21 RX ORDER — NAPROXEN 250 MG/1
250 TABLET ORAL 2 TIMES DAILY WITH MEALS
Qty: 14 TAB | Refills: 0 | Status: SHIPPED | OUTPATIENT
Start: 2019-12-21 | End: 2019-12-31 | Stop reason: SDUPTHER

## 2019-12-21 RX ADMIN — NAPROXEN 250 MG: 250 TABLET ORAL at 15:08

## 2019-12-21 NOTE — ED NOTES
I have reviewed discharge instructions with the patient. The patient verbalized understanding. Patient left ED via Discharge Method: ambulatory to Home. Opportunity for questions and clarification provided. Patient given 1 scripts. To continue your aftercare when you leave the hospital, you may receive an automated call from our care team to check in on how you are doing. This is a free service and part of our promise to provide the best care and service to meet your aftercare needs.  If you have questions, or wish to unsubscribe from this service please call [phone]. Thank you for Choosing our [facility] Emergency Department.

## 2019-12-21 NOTE — ED PROVIDER NOTES
Arpit Schafer is a [de-identified] y.o. female who presents to the ED with a chief complaint of right leg pain for the last couple months. She states she has been [facility] emergency department for this and had a negative ultrasound. She is also had blood work done and just had a nerve conduction study done 2 days ago. She called her PCPs office but have been unable to get the results. She was hoping to get the results today. She intermittently has a lot of calf pain and numbness and tingling. At the moment pain is currently subsided.              Past Medical History:   Diagnosis Date    Arrhythmia     Heart murmer    Blindness of right eye     Cancer ([facility] [address] 1978    bilat breast---- surg only    Cardiac murmur     per pt-- never been evaluated-- echo ordered by dr Heather Mcdaniel prior to surg    Disc degeneration, lumbar     Dizziness     Fibrocystic disease of breast     GERD (gastroesophageal reflux disease)     Hyperlipidemia     Hypertension     controlled with med    Imbalance     Osteoarthritis     generalized    Osteoporosis     Sciatica     Spinal stenosis     Squamous cell carcinoma of scalp 2014    TIA (transient ischemic attack) 2008    lost sight in right eye    Tinnitus     Vertigo        Past Surgical History:   Procedure Laterality Date    HX CATARACT REMOVAL Bilateral 2009    with lens implanted    HX HYSTERECTOMY  1969    HX KNEE REPLACEMENT Left 2007    HX KNEE REPLACEMENT Right 2009    HX MASTECTOMY Bilateral 1978    then implants then renoved    HX ORTHOPAEDIC Right 2008    knee scope    HX ORTHOPAEDIC  2016    fusion L3-5    HX OTHER SURGICAL  2015    skin bx---         Family History:   Problem Relation Age of Onset    Cancer Mother     Heart Disease Father     Cancer Father     Cancer Sister     Heart Disease Sister     Cancer Brother     Heart Disease Brother        Social History     Socioeconomic History    Marital status:      Spouse name: Not on file    Number of children: Not on file    Years of education: Not on file    Highest education level: Not on file   Occupational History    Not on file   Social Needs    Financial resource strain: Not on file    Food insecurity:     Worry: Not on file     Inability: Not on file    Transportation needs:     Medical: Not on file     Non-medical: Not on file   Tobacco Use    Smoking status: Former Smoker     Packs/day: 0.25     Years: 50.00     Pack years: 12.50     Types: Cigarettes     Last attempt to quit: 10/30/2008     Years since quittin.1    Smokeless tobacco: Never Used   Substance and Sexual Activity    Alcohol use: No     Alcohol/week: 0.0 standard drinks    Drug use: No    Sexual activity: Not on file   Lifestyle    Physical activity:     Days per week: Not on file     Minutes per session: Not on file    Stress: Not on file   Relationships    Social connections:     Talks on phone: Not on file     Gets together: Not on file     Attends [employer] service: Not on file     Active member of club or organization: Not on file     Attends meetings of clubs or organizations: Not on file     Relationship status: Not on file    Intimate partner violence:     Fear of current or ex partner: Not on file     Emotionally abused: Not on file     Physically abused: Not on file     Forced sexual activity: Not on file   Other Topics Concern    Not on file   Social History Narrative    Not on file         ALLERGIES: Patient has no known allergies. Review of Systems   Constitutional: Negative for appetite change and fever. Respiratory: Negative for cough, chest tightness, shortness of breath, wheezing and stridor. Cardiovascular: Negative for chest pain and palpitations. Gastrointestinal: Negative for abdominal pain, diarrhea, nausea and vomiting. Musculoskeletal: Positive for arthralgias. Negative for neck pain and neck stiffness. Skin: Negative for color change, rash and wound.    All other systems reviewed and are negative. Vitals:    12/21/19 1320   BP: 187/82   Pulse: 78   Resp: 16   Temp: 98 °F (36.7 °C)   SpO2: 99%   Weight: 69.9 kg (154 lb)   Height: 5' 6\" (1.676 m)            Physical Exam  Vitals signs and nursing note reviewed. Constitutional:       General: She is not in acute distress. Appearance: Normal appearance. She is not ill-appearing, toxic-appearing or diaphoretic. HENT:      Head: Normocephalic and atraumatic. Neck:      Musculoskeletal: Normal range of motion and neck supple. Cardiovascular:      Rate and Rhythm: Normal rate and regular rhythm. Pulses: Normal pulses. Heart sounds: Normal heart sounds. No murmur. No friction rub. No gallop. Comments: Intact pulses to foot no cool skin. Pulmonary:      Effort: Pulmonary effort is normal. No respiratory distress. Breath sounds: Normal breath sounds. No stridor. No wheezing, rhonchi or rales. Chest:      Chest wall: No tenderness. Abdominal:      General: Abdomen is flat. There is no distension. Tenderness: There is no tenderness. There is no guarding or rebound. Hernia: No hernia is present. Skin:     General: Skin is warm. Capillary Refill: Capillary refill takes less than 2 seconds. Coloration: Skin is not jaundiced or pale. Findings: No bruising, erythema, lesion or rash. Comments: No calf swelling redness or signs of infection. Neurological:      General: No focal deficit present. Mental Status: She is alert and oriented to person, place, and time. Psychiatric:         Mood and Affect: Mood normal.         Behavior: Behavior normal.          MDM  Number of Diagnoses or Management Options  Pain of right lower extremity:   Diagnosis management comments: Patient has chronic leg pain I have attempted to find a nerve conduction study but it does not appear to be scanned into the record as indicated from their note.   Patient really want to get these results today. As their office is not open today will likely be Monday before they are able to see these results. Patient has only been taking an aspirin for the pain I will try her on some naproxen to see if this helps. I have reviewed her labs and ultrasound previously. External leg exam is normal.      Nikunj Santiago MD; 12/21/2019 @3:12 PM Voice dictation software was used during the making of this note. This software is not perfect and grammatical and other typographical errors may be present.   This note has not been proofread for errors.  ===================================================================            Procedures

## 2019-12-21 NOTE — ED TRIAGE NOTES
Pt with right leg pain below knee. Pt states she feels like right lower leg is like \"pins and needles\" and calf pain. Pt states she had ultrasound to rule out blood clot. Pt states she is having continued symptoms.     Bridget Fuller RN

## 2019-12-21 NOTE — DISCHARGE INSTRUCTIONS
Patient Education        Leg Pain: Care Instructions  Your Care Instructions  Many things can cause leg pain. Too much exercise or overuse can cause a muscle cramp (or charley horse). You can get leg cramps from not eating a balanced diet that has enough potassium, calcium, and other minerals. If you do not drink enough fluids or are taking certain medicines, you may develop leg cramps. Other causes of leg pain include injuries, blood flow problems, nerve damage, and twisted and enlarged veins (varicose veins). You can usually ease pain with self-care. Your doctor may recommend that you rest your leg and keep it elevated. Follow-up care is a key part of your treatment and safety. Be sure to make and go to all appointments, and call your doctor if you are having problems. It's also a good idea to know your test results and keep a list of the medicines you take. How can you care for yourself at home? · Take pain medicines exactly as directed. ? If the doctor gave you a prescription medicine for pain, take it as prescribed. ? If you are not taking a prescription pain medicine, ask your doctor if you can take an over-the-counter medicine. · Take any other medicines exactly as prescribed. Call your doctor if you think you are having a problem with your medicine. · Rest your leg while you have pain, and avoid standing for long periods of time. · Prop up your leg at or above the level of your heart when possible. · Make sure you are eating a balanced diet that is rich in calcium, potassium, and magnesium, especially if you are pregnant. · If directed by your doctor, put ice or a cold pack on the area for 10 to 20 minutes at a time. Put a thin cloth between the ice and your skin. · Your leg may be in a splint, a brace, or an elastic bandage, and you may have crutches to help you walk. Follow your doctor's directions about how long to wear supports and how to use the crutches.   When should you call for help?  Call 911 anytime you think you may need emergency care. For example, call if:    · You have sudden chest pain and shortness of breath, or you cough up blood.     · Your leg is cool or pale or changes color.    Call your doctor now or seek immediate medical care if:    · You have increasing or severe pain.     · Your leg suddenly feels weak and you cannot move it.     · You have signs of a blood clot, such as:  ? Pain in your calf, back of the knee, thigh, or groin. ? Redness and swelling in your leg or groin.     · You have signs of infection, such as:  ? Increased pain, swelling, warmth, or redness. ? Red streaks leading from the sore area. ? Pus draining from a place on your leg. ? A fever.     · You cannot bear weight on your leg.    Watch closely for changes in your health, and be sure to contact your doctor if:    · You do not get better as expected. Where can you learn more? Go to http://dianne-simeon.info/. Enter E922 in the search box to learn more about \"Leg Pain: Care Instructions. \"  Current as of: June 26, 2019  Content Version: 12.2  © 9583-5077 [employer]. Care instructions adapted under license by [employer] (which disclaims liability or warranty for this information). If you have questions about a medical condition or this instruction, always ask your healthcare professional. [place] [address] any warranty or liability for your use of this information.

## 2020-01-23 ENCOUNTER — PATIENT OUTREACH (OUTPATIENT)
Dept: CASE MANAGEMENT | Age: 80
End: 2020-01-23

## 2020-01-23 NOTE — PROGRESS NOTES
CCM outreached to patient. No answer at this time, message left. If no response, [facility] will outreach on 1/24/20. This note will not be viewable in [address]

## 2020-01-24 ENCOUNTER — PATIENT OUTREACH (OUTPATIENT)
Dept: CASE MANAGEMENT | Age: 80
End: 2020-01-24

## 2020-01-24 NOTE — PROGRESS NOTES
Complex Case Management  Initial Assessment  Addendum to Connect Care  Note  Utilize questions pertinent to patient    Referral Source & Reason Referral via [address] Assignment. Pt ED admission risk 67. Primary concerns per patient and/or pts family/caregiver CCM outreached to patient. Discussed CCM services. Patient agreeable to call. Patient states she is doing well at this time. Patient did state she has been having issues with her right leg pain that consists of cramping and pins and needles down her right leg. Pt states she has had recent EMG which showed possible issue with C1. Pt continues to undergo workup. Recent Hospitalization(s)/ED Visits Last Ed visit was 12/21/19. Current with Home Health?  - Agency? Patient currently is not setup with [facility] services. HH not needed at this time. Social Needs:  - Able to afford medications? - Financial assessment?  - Access to care? Transportation? Patient states she is able to afford all medications. No financial issues at this time. Patient states that she currently still drives. Nutritional Assessment  - Appetite? - Obesity?  - Failure to Thrive? - Bowels ? Patient states her appetite remains good. Patient is continent of bowl and bladder. No issues at this time. Cognitive Assessment  - History of dementia  - Health literacy Pt has no dx of dementia listed and is aware of medical needs and issues. Patient did state that she is scheduled in march to go for some testing to test for mild dementia. Mobility/Activity Assessment  - Bed/chair bound? - Make use of assistive devices? - Does patient still drive? Patient states she is independent with adls, has been using cane to ambulate. Discussed with patient importance of using cane when ambulating for safety measures. Pt verbalized understanding. Patient currently still drives.      Plan/Interventions/Education  - Follow up Appointments  - Referrals Discussed with patient CCM services. Pt states she is agreeable to outreach. Patient states overall she is doing well. Very appreciative of call. Patient states she has been undergoing testing for right leg cramping and pins and needles feeling. Patient has had recent EMG that showed possible issue with C1 nerve. Patient states she takes Neurontin and aspirin and that has been managing pain. Pt states she recently bought a camper and when that is setup she will be moving to her daughters property so she has more assistance when needed. Patient has no issues or concerns at this time. discussed with patient that CCM would outreach next week. Patient agreeable. Patient states she has a follow up appointment with Jong King on 2/14/20. Pt also has follow up appointment on 3/19/20 with Dr. Kathie Pacheco. This note will not be viewable in [address]

## 2020-01-29 ENCOUNTER — PATIENT OUTREACH (OUTPATIENT)
Dept: CASE MANAGEMENT | Age: 80
End: 2020-01-29

## 2020-01-29 NOTE — PROGRESS NOTES
CCM outreached to patient. Patient states she continues to do well. Patient stated she is currently moving some of her things into her new camper. Patient states to call her on [phone]. CCM discussed with patient that I would outreach next week for follow up. Patient agreeable. This note will not be viewable in [address]

## 2020-02-06 ENCOUNTER — PATIENT OUTREACH (OUTPATIENT)
Dept: CASE MANAGEMENT | Age: 80
End: 2020-02-06

## 2020-02-06 NOTE — PROGRESS NOTES
CCM outreached to patient. Patient states she is doing well. Patient states no issues or concerns at this time. CCM discussed with patient that I would outreach again next week. Patient agreeable. This note will not be viewable in [address]

## 2020-02-13 ENCOUNTER — PATIENT OUTREACH (OUTPATIENT)
Dept: CASE MANAGEMENT | Age: 80
End: 2020-02-13

## 2020-02-13 NOTE — PROGRESS NOTES
CCM outreached to patient. Patient states she is doing well at this time. Patient states she is in the process of moving so she has been very busy. Patient states she has a follow up appointment tomorrow for leg issue but needs to cancel d/t moving. CCM offered to reschedule appointment, however patient states she will do that. CCM discussed with patient that I would follow up next week. Patient agreeable. This note will not be viewable in [address]

## 2020-02-19 ENCOUNTER — PATIENT OUTREACH (OUTPATIENT)
Dept: CASE MANAGEMENT | Age: 80
End: 2020-02-19

## 2020-02-19 NOTE — PROGRESS NOTES
CCM outreached to patient. Patient states she is doing well. Patient states she has a follow up with Dr. Casper Canavan on 3/19/20. Patient states no questions or concerns. CCM discussed with patient that I would follow up in 2 weeks. Patient agreeable. This note will not be viewable in [address]

## 2020-03-05 ENCOUNTER — PATIENT OUTREACH (OUTPATIENT)
Dept: CASE MANAGEMENT | Age: 80
End: 2020-03-05

## 2020-03-05 NOTE — PROGRESS NOTES
CCM outreached to patient. Patient states she is doing well at this time. Patient is taking aspirin for pain relief to RLE. Patient states aspirin has positive effect for pain control. Patient states she has a follow up with pcp with Dr. Imelda Ferreira on 3/19/20. No questions or concerns at this time. CCM discussed with patient that I would follow up next week. Patient agreeable. This note will not be viewable in [address]

## 2020-03-11 ENCOUNTER — PATIENT OUTREACH (OUTPATIENT)
Dept: CASE MANAGEMENT | Age: 80
End: 2020-03-11

## 2020-03-11 NOTE — PROGRESS NOTES
CCM outreached to patient. Patient continues to have some pain to RLE. Pain is being managed with medication. No questions or concerns at this time. CCM discussed I would follow up in 2 weeks. Patient agreeable. This note will not be viewable in [address]

## 2020-03-25 ENCOUNTER — PATIENT OUTREACH (OUTPATIENT)
Dept: CASE MANAGEMENT | Age: 80
End: 2020-03-25

## 2020-03-25 NOTE — PROGRESS NOTES
CCM received call from patient. Patient states she is doing well at this time. Patient states no questions or concerns at this time. CCM discussed with patient that I would graduate patient from services at this time. Patient agreeable. CCM will close case at this time, but remain available if needed. This note will not be viewable in [address]

## 2020-03-25 NOTE — PROGRESS NOTES
[facility] outreached to patient. No answer at this time, message left. Awaiting response. If no response, [facility] will outreach on 4/1/20. This note will not be viewable in [address]

## 2020-08-13 RX ORDER — CEFAZOLIN SODIUM/WATER 2 G/20 ML
2 SYRINGE (ML) INTRAVENOUS ONCE
Status: CANCELLED | OUTPATIENT
Start: 2020-08-13 | End: 2020-08-13

## 2020-08-17 ENCOUNTER — HOSPITAL ENCOUNTER (OUTPATIENT)
Dept: SURGERY | Age: 80
Discharge: HOME | End: 2020-08-17
Payer: MEDICARE

## 2020-08-17 VITALS
BODY MASS INDEX: 23.51 KG/M2 | TEMPERATURE: 98.1 F | SYSTOLIC BLOOD PRESSURE: 202 MMHG | WEIGHT: 141.1 LBS | HEART RATE: 56 BPM | DIASTOLIC BLOOD PRESSURE: 77 MMHG | RESPIRATION RATE: 16 BRPM | HEIGHT: 65 IN | OXYGEN SATURATION: 100 %

## 2020-08-17 LAB
BACTERIA SPEC CULT: NORMAL
SERVICE CMNT-IMP: NORMAL

## 2020-08-17 PROCEDURE — 87641 MR-STAPH DNA AMP PROBE: CPT

## 2020-08-17 PROCEDURE — 93005 ELECTROCARDIOGRAM TRACING: CPT | Performed by: ANESTHESIOLOGY

## 2020-08-17 NOTE — PERIOP NOTES
Patient verified name & . Order to obtain consent  found in EHR  and matches case posting. TYPE  CASE:1B              Orders:  received  Labs per Spine protocol:  MRSA/ MSSA swab   Labs per anesthesia protocol: none  EKG/cardiac records  :  Completed today and reviewed results with Dr. Deep Almanza. No orders given. Glucose: not indicated    Dr. Deep Almanza aware patient has previously documented heart murmur: (ECHO 3/28/19 EF 66% -RVSP 37 \"mild mitral regurgitation, physiologic TC and MV regurgitation\"). She denies dizziness, CP, SOB or any changes in activity tolerance (states cuts own grass with push mower without difficultly). Can climb flight of stairs without SOB. Dr. Deep Almanza aware BP elevated (202/77). Pt states \"it's always high at the doctor but is normal at home\". States she keeps home log for PCP. HR low 50's at today's appointment. On beta blocker. States is normal HR for her and denies dizziness or weakness. Reports \"mini stroke\" in . States surgeon instructed to hold 325 mg aspirin 5 days for procedure. Dr. Deep Almanza approved EKG. Gives orders for patient to bring BP log with her DOS. No further orders given. Patient verbalizes understanding to bring BP log DOS and these instructions are provided on her medication instruction sheet. Medication list visualized today. Medication instructions provided per PTA med instruction handout and reviewed with patient. She provides teach back of instructions and verbalizes understanding. Pt was provided with antibacterial soap, Hibiclens. Handouts and all Surgery instructions provided to pt and pt verbalizes understanding. Patient Guide to Surgery Packet provided to patient. Packet includes Patient Guide to surgery handout, Facts about Pain Management handout, Hand Hygiene handout, Patient Education and Teaching Sheet -Transfusion of Blood and Blood Products handout, and  [facility] frequent question and answer sheet.  Guide reviewed with the patient and all questions answered to the satisfaction of the patient. Pt advised to visit [email or web address]. [email or web address] for more educational information regarding anesthesia and to record any additional questions that arise so that it can be addressed by the anesthesiologist on the morning of surgery. Patient instructed on the following and verbalized understanding:  Arrive at Main entrance, time of arrival to be called the day before by 1700. Responsible adult must drive patient to and from hospital, and patient must have responsible adult present for 24 hours postoperatively. Npo after midnight including gum, mints and ice chips. Shower using hibiclens the night before and the morning of surgery. Hibiclens provided to the patient with handout and verbal instructions for use. Leave all valuables at home. Instructed on no jewelry or body piercings on the dos. Bring insurance card and ID. No perfumes, oil, powder, colognes, makeup or  lotions on the skin. One visitor policy discussed. Patient verbalized understanding of all instructions and provided all medical/health information to the best of their ability.

## 2020-08-18 LAB
ATRIAL RATE: 49 BPM
CALCULATED P AXIS, ECG09: 21 DEGREES
CALCULATED R AXIS, ECG10: 28 DEGREES
CALCULATED T AXIS, ECG11: 150 DEGREES
DIAGNOSIS, 93000: NORMAL
P-R INTERVAL, ECG05: 126 MS
Q-T INTERVAL, ECG07: 470 MS
QRS DURATION, ECG06: 84 MS
QTC CALCULATION (BEZET), ECG08: 424 MS
VENTRICULAR RATE, ECG03: 49 BPM

## 2020-08-21 NOTE — H&P
Allergies:NKDA  Medications:Aspirin; Lovastatin;Metoprolol Tartrate;Quinapril HCl    CC: LOW BACK AND RIGHT LEG PAIN    HPI:  Recheck: We had done an L3 to L5 laminectomy and fusion 8 months ago and she did great. We had gotten rid of the pain she had had for years. But then she developed this right leg pain that was acute in onset. She has an EMG nerve conduction study that shows a left S1 radiculopathy. That disc is very degenerated, so we got an MRI scan to evaluate. It shows lateral recess stenosis on the right at L5-S1, some combination of disc and facet and ligamentum flavum that is really compressed in the nerve. We can see the left nerve. So this would fit with her pain. Her pain is only from the knee down to the foot with some numbness and tingling. She feels like she is getting some symptoms in the left leg because she is really favoring the right leg and putting all the pressure on the left. The MRI scan fits well what her symptoms are and what the EMG shows. I did show her that she had some mild stenosis up above the fusion at L2-3 but as best I can tell, she is asymptomatic from that. The patient does not want to do injections. She wants to go straight to surgery. She wanted to know whether we should do L2-3 and extend the fusion but I think that we should try to keep this small and just do the 5-1 unilateral decompression. She is good with that. We went through her medical history. The patient is not diabetic. She does take aspirin. There are no other medical problems. She knows to stop the aspirin a week in advance. PE:  On exam she is thin but healthy. She is alert and oriented x3. She is very pleasant. Pupils equal, round, reactive to light. Oropharynx is clear. Neck is without adenopathy or bruits. The lungs are clear to auscultation bilaterally. Heart has a regular rate and rhythm without murmur. The abdomen is soft and nontender.       ASSESSMENT/PLAN: I went through the procedure again. I told her this is outpatient. We just need to trim away the affecting agents on the right side at the bottom level. The pain should be a lot less and recovery should be a lot quicker. The  risks do include  death, nerve injury,  bleeding,  heart attack, stroke, clot in leg, clot in lung, dural tear, recurrent back problems  and failure to get adequate pain relief. She understands and she wants to proceed. We will schedule the patient for a right-sided L5-S1 laminectomy.                 Electronically Signed By Svetlana Price MD

## 2020-08-23 ENCOUNTER — ANESTHESIA EVENT (OUTPATIENT)
Dept: SURGERY | Age: 80
End: 2020-08-23
Payer: MEDICARE

## 2020-08-24 ENCOUNTER — APPOINTMENT (OUTPATIENT)
Dept: GENERAL RADIOLOGY | Age: 80
End: 2020-08-24
Attending: ORTHOPAEDIC SURGERY
Payer: MEDICARE

## 2020-08-24 ENCOUNTER — ANESTHESIA (OUTPATIENT)
Dept: SURGERY | Age: 80
End: 2020-08-24
Payer: MEDICARE

## 2020-08-24 ENCOUNTER — HOSPITAL ENCOUNTER (OUTPATIENT)
Age: 80
Setting detail: OUTPATIENT SURGERY
Discharge: HOME | End: 2020-08-24
Attending: ORTHOPAEDIC SURGERY | Admitting: ORTHOPAEDIC SURGERY
Payer: MEDICARE

## 2020-08-24 VITALS
HEIGHT: 65 IN | OXYGEN SATURATION: 95 % | TEMPERATURE: 98.1 F | RESPIRATION RATE: 15 BRPM | WEIGHT: 141 LBS | BODY MASS INDEX: 23.49 KG/M2 | DIASTOLIC BLOOD PRESSURE: 81 MMHG | SYSTOLIC BLOOD PRESSURE: 175 MMHG | HEART RATE: 53 BPM

## 2020-08-24 DIAGNOSIS — M48.062 SPINAL STENOSIS, LUMBAR REGION, WITH NEUROGENIC CLAUDICATION: Primary | ICD-10-CM

## 2020-08-24 PROCEDURE — 77030037088 HC TUBE ENDOTRACH ORAL NSL COVD-A: Performed by: NURSE ANESTHETIST, CERTIFIED REGISTERED

## 2020-08-24 PROCEDURE — 77030019908 HC STETH ESOPH SIMS -A: Performed by: NURSE ANESTHETIST, CERTIFIED REGISTERED

## 2020-08-24 PROCEDURE — 74011250637 HC RX REV CODE- 250/637: Performed by: ANESTHESIOLOGY

## 2020-08-24 PROCEDURE — 74011000250 HC RX REV CODE- 250: Performed by: NURSE ANESTHETIST, CERTIFIED REGISTERED

## 2020-08-24 PROCEDURE — 74011250636 HC RX REV CODE- 250/636: Performed by: ORTHOPAEDIC SURGERY

## 2020-08-24 PROCEDURE — 74011250636 HC RX REV CODE- 250/636: Performed by: ANESTHESIOLOGY

## 2020-08-24 PROCEDURE — 74011000250 HC RX REV CODE- 250: Performed by: ORTHOPAEDIC SURGERY

## 2020-08-24 PROCEDURE — 77030040922 HC BLNKT HYPOTHRM STRY -A: Performed by: NURSE ANESTHETIST, CERTIFIED REGISTERED

## 2020-08-24 PROCEDURE — 76060000034 HC ANESTHESIA 1.5 TO 2 HR: Performed by: ORTHOPAEDIC SURGERY

## 2020-08-24 PROCEDURE — 76210000021 HC REC RM PH II 0.5 TO 1 HR: Performed by: ORTHOPAEDIC SURGERY

## 2020-08-24 PROCEDURE — 74011000272 HC RX REV CODE- 272: Performed by: ORTHOPAEDIC SURGERY

## 2020-08-24 PROCEDURE — 74011250636 HC RX REV CODE- 250/636

## 2020-08-24 PROCEDURE — 77030012894: Performed by: ORTHOPAEDIC SURGERY

## 2020-08-24 PROCEDURE — 76210000006 HC OR PH I REC 0.5 TO 1 HR: Performed by: ORTHOPAEDIC SURGERY

## 2020-08-24 PROCEDURE — 77030029099 HC BN WAX SSPC -A: Performed by: ORTHOPAEDIC SURGERY

## 2020-08-24 PROCEDURE — 77030039425 HC BLD LARYNG TRULITE DISP TELE -A: Performed by: NURSE ANESTHETIST, CERTIFIED REGISTERED

## 2020-08-24 PROCEDURE — 77030031139 HC SUT VCRL2 J&J -A: Performed by: ORTHOPAEDIC SURGERY

## 2020-08-24 PROCEDURE — 76010000162 HC OR TIME 1.5 TO 2 HR INTENSV-TIER 1: Performed by: ORTHOPAEDIC SURGERY

## 2020-08-24 PROCEDURE — 74011250637 HC RX REV CODE- 250/637

## 2020-08-24 PROCEDURE — 77030025623 HC BUR RND PRECIS STRY -D: Performed by: ORTHOPAEDIC SURGERY

## 2020-08-24 PROCEDURE — 72020 X-RAY EXAM OF SPINE 1 VIEW: CPT

## 2020-08-24 PROCEDURE — 74011250636 HC RX REV CODE- 250/636: Performed by: NURSE ANESTHETIST, CERTIFIED REGISTERED

## 2020-08-24 PROCEDURE — 77030003028 HC SUT VCRL J&J -A: Performed by: ORTHOPAEDIC SURGERY

## 2020-08-24 PROCEDURE — 77030028270 HC SRGFL HEMSTAT MTRX J&J -C: Performed by: ORTHOPAEDIC SURGERY

## 2020-08-24 PROCEDURE — 77030040361 HC SLV COMPR DVT MDII -B: Performed by: ORTHOPAEDIC SURGERY

## 2020-08-24 RX ORDER — GLYCOPYRROLATE 0.2 MG/ML
INJECTION INTRAMUSCULAR; INTRAVENOUS AS NEEDED
Status: DISCONTINUED | OUTPATIENT
Start: 2020-08-24 | End: 2020-08-24 | Stop reason: HOSPADM

## 2020-08-24 RX ORDER — CEPHALEXIN 500 MG/1
250 CAPSULE ORAL 4 TIMES DAILY
Qty: 12 CAP | Refills: 0 | Status: SHIPPED | OUTPATIENT
Start: 2020-08-24 | End: 2020-11-18 | Stop reason: ALTCHOICE

## 2020-08-24 RX ORDER — SODIUM CHLORIDE 0.9 % (FLUSH) 0.9 %
5-40 SYRINGE (ML) INJECTION AS NEEDED
Status: DISCONTINUED | OUTPATIENT
Start: 2020-08-24 | End: 2020-08-24 | Stop reason: HOSPADM

## 2020-08-24 RX ORDER — BUPIVACAINE HYDROCHLORIDE AND EPINEPHRINE 5; 5 MG/ML; UG/ML
INJECTION, SOLUTION EPIDURAL; INTRACAUDAL; PERINEURAL AS NEEDED
Status: DISCONTINUED | OUTPATIENT
Start: 2020-08-24 | End: 2020-08-24 | Stop reason: HOSPADM

## 2020-08-24 RX ORDER — NEOSTIGMINE METHYLSULFATE 1 MG/ML
INJECTION, SOLUTION INTRAVENOUS AS NEEDED
Status: DISCONTINUED | OUTPATIENT
Start: 2020-08-24 | End: 2020-08-24 | Stop reason: HOSPADM

## 2020-08-24 RX ORDER — SODIUM CHLORIDE 0.9 % (FLUSH) 0.9 %
5-40 SYRINGE (ML) INJECTION EVERY 8 HOURS
Status: DISCONTINUED | OUTPATIENT
Start: 2020-08-24 | End: 2020-08-24 | Stop reason: HOSPADM

## 2020-08-24 RX ORDER — VANCOMYCIN HYDROCHLORIDE 1 G/20ML
INJECTION, POWDER, LYOPHILIZED, FOR SOLUTION INTRAVENOUS AS NEEDED
Status: DISCONTINUED | OUTPATIENT
Start: 2020-08-24 | End: 2020-08-24 | Stop reason: HOSPADM

## 2020-08-24 RX ORDER — CEFAZOLIN SODIUM/WATER 2 G/20 ML
2 SYRINGE (ML) INTRAVENOUS ONCE
Status: COMPLETED | OUTPATIENT
Start: 2020-08-24 | End: 2020-08-24

## 2020-08-24 RX ORDER — ONDANSETRON 2 MG/ML
INJECTION INTRAMUSCULAR; INTRAVENOUS AS NEEDED
Status: DISCONTINUED | OUTPATIENT
Start: 2020-08-24 | End: 2020-08-24 | Stop reason: HOSPADM

## 2020-08-24 RX ORDER — HYDROMORPHONE HYDROCHLORIDE 2 MG/ML
0.5 INJECTION, SOLUTION INTRAMUSCULAR; INTRAVENOUS; SUBCUTANEOUS
Status: DISCONTINUED | OUTPATIENT
Start: 2020-08-24 | End: 2020-08-24 | Stop reason: HOSPADM

## 2020-08-24 RX ORDER — FENTANYL CITRATE 50 UG/ML
100 INJECTION, SOLUTION INTRAMUSCULAR; INTRAVENOUS ONCE
Status: DISCONTINUED | OUTPATIENT
Start: 2020-08-24 | End: 2020-08-24 | Stop reason: HOSPADM

## 2020-08-24 RX ORDER — SODIUM CHLORIDE, SODIUM LACTATE, POTASSIUM CHLORIDE, CALCIUM CHLORIDE 600; 310; 30; 20 MG/100ML; MG/100ML; MG/100ML; MG/100ML
100 INJECTION, SOLUTION INTRAVENOUS CONTINUOUS
Status: DISCONTINUED | OUTPATIENT
Start: 2020-08-24 | End: 2020-08-24 | Stop reason: HOSPADM

## 2020-08-24 RX ORDER — LIDOCAINE HYDROCHLORIDE 20 MG/ML
INJECTION, SOLUTION EPIDURAL; INFILTRATION; INTRACAUDAL; PERINEURAL AS NEEDED
Status: DISCONTINUED | OUTPATIENT
Start: 2020-08-24 | End: 2020-08-24 | Stop reason: HOSPADM

## 2020-08-24 RX ORDER — LIDOCAINE HYDROCHLORIDE 10 MG/ML
0.1 INJECTION INFILTRATION; PERINEURAL AS NEEDED
Status: DISCONTINUED | OUTPATIENT
Start: 2020-08-24 | End: 2020-08-24 | Stop reason: HOSPADM

## 2020-08-24 RX ORDER — DEXAMETHASONE SODIUM PHOSPHATE 4 MG/ML
INJECTION, SOLUTION INTRA-ARTICULAR; INTRALESIONAL; INTRAMUSCULAR; INTRAVENOUS; SOFT TISSUE AS NEEDED
Status: DISCONTINUED | OUTPATIENT
Start: 2020-08-24 | End: 2020-08-24 | Stop reason: HOSPADM

## 2020-08-24 RX ORDER — ROCURONIUM BROMIDE 10 MG/ML
INJECTION, SOLUTION INTRAVENOUS AS NEEDED
Status: DISCONTINUED | OUTPATIENT
Start: 2020-08-24 | End: 2020-08-24 | Stop reason: HOSPADM

## 2020-08-24 RX ORDER — HYDROCODONE BITARTRATE AND ACETAMINOPHEN 5; 325 MG/1; MG/1
1 TABLET ORAL
Qty: 20 TAB | Refills: 0 | Status: SHIPPED | OUTPATIENT
Start: 2020-08-24 | End: 2020-08-29

## 2020-08-24 RX ORDER — FENTANYL CITRATE 50 UG/ML
INJECTION, SOLUTION INTRAMUSCULAR; INTRAVENOUS AS NEEDED
Status: DISCONTINUED | OUTPATIENT
Start: 2020-08-24 | End: 2020-08-24 | Stop reason: HOSPADM

## 2020-08-24 RX ORDER — SODIUM CHLORIDE 9 MG/ML
25 INJECTION, SOLUTION INTRAVENOUS CONTINUOUS
Status: DISCONTINUED | OUTPATIENT
Start: 2020-08-24 | End: 2020-08-24 | Stop reason: HOSPADM

## 2020-08-24 RX ORDER — FAMOTIDINE 20 MG/1
20 TABLET, FILM COATED ORAL ONCE
Status: COMPLETED | OUTPATIENT
Start: 2020-08-24 | End: 2020-08-24

## 2020-08-24 RX ORDER — NALOXONE HYDROCHLORIDE 0.4 MG/ML
0.1 INJECTION, SOLUTION INTRAMUSCULAR; INTRAVENOUS; SUBCUTANEOUS AS NEEDED
Status: DISCONTINUED | OUTPATIENT
Start: 2020-08-24 | End: 2020-08-24 | Stop reason: HOSPADM

## 2020-08-24 RX ORDER — HYDROCODONE BITARTRATE AND ACETAMINOPHEN 7.5; 325 MG/1; MG/1
1 TABLET ORAL AS NEEDED
Status: DISCONTINUED | OUTPATIENT
Start: 2020-08-24 | End: 2020-08-24 | Stop reason: HOSPADM

## 2020-08-24 RX ORDER — MIDAZOLAM HYDROCHLORIDE 1 MG/ML
2 INJECTION, SOLUTION INTRAMUSCULAR; INTRAVENOUS
Status: DISCONTINUED | OUTPATIENT
Start: 2020-08-24 | End: 2020-08-24 | Stop reason: HOSPADM

## 2020-08-24 RX ORDER — PROPOFOL 10 MG/ML
INJECTION, EMULSION INTRAVENOUS AS NEEDED
Status: DISCONTINUED | OUTPATIENT
Start: 2020-08-24 | End: 2020-08-24 | Stop reason: HOSPADM

## 2020-08-24 RX ORDER — OXYCODONE HYDROCHLORIDE 5 MG/1
5 TABLET ORAL
Status: DISCONTINUED | OUTPATIENT
Start: 2020-08-24 | End: 2020-08-24 | Stop reason: HOSPADM

## 2020-08-24 RX ADMIN — FENTANYL CITRATE 100 MCG: 50 INJECTION INTRAMUSCULAR; INTRAVENOUS at 09:34

## 2020-08-24 RX ADMIN — PROPOFOL 150 MCG: 10 INJECTION, EMULSION INTRAVENOUS at 09:34

## 2020-08-24 RX ADMIN — PHENYLEPHRINE HYDROCHLORIDE 100 MCG: 10 INJECTION INTRAVENOUS at 10:15

## 2020-08-24 RX ADMIN — LIDOCAINE HYDROCHLORIDE 80 MG: 20 INJECTION, SOLUTION EPIDURAL; INFILTRATION; INTRACAUDAL; PERINEURAL at 09:34

## 2020-08-24 RX ADMIN — PROPOFOL 20 MCG: 10 INJECTION, EMULSION INTRAVENOUS at 10:41

## 2020-08-24 RX ADMIN — PHENYLEPHRINE HYDROCHLORIDE 100 MCG: 10 INJECTION INTRAVENOUS at 10:26

## 2020-08-24 RX ADMIN — PHENYLEPHRINE HYDROCHLORIDE 100 MCG: 10 INJECTION INTRAVENOUS at 09:51

## 2020-08-24 RX ADMIN — DEXAMETHASONE SODIUM PHOSPHATE 4 MG: 4 INJECTION, SOLUTION INTRAMUSCULAR; INTRAVENOUS at 09:44

## 2020-08-24 RX ADMIN — FAMOTIDINE 20 MG: 20 TABLET, FILM COATED ORAL at 08:31

## 2020-08-24 RX ADMIN — Medication 2 G: at 09:43

## 2020-08-24 RX ADMIN — ROCURONIUM BROMIDE 40 MG: 10 INJECTION, SOLUTION INTRAVENOUS at 09:35

## 2020-08-24 RX ADMIN — ONDANSETRON 4 MG: 2 INJECTION INTRAMUSCULAR; INTRAVENOUS at 09:44

## 2020-08-24 RX ADMIN — Medication 3 AMPULE: at 08:31

## 2020-08-24 RX ADMIN — GLYCOPYRROLATE 0.8 MG: 0.2 INJECTION, SOLUTION INTRAMUSCULAR; INTRAVENOUS at 10:43

## 2020-08-24 RX ADMIN — HYDROCODONE BITARTRATE AND ACETAMINOPHEN 1 TABLET: 7.5; 325 TABLET ORAL at 11:57

## 2020-08-24 RX ADMIN — Medication 4.5 MG: at 10:43

## 2020-08-24 RX ADMIN — SODIUM CHLORIDE, SODIUM LACTATE, POTASSIUM CHLORIDE, AND CALCIUM CHLORIDE 100 ML/HR: 600; 310; 30; 20 INJECTION, SOLUTION INTRAVENOUS at 08:31

## 2020-08-24 NOTE — OP NOTES
[address] [place] [address]  OPERATIVE REPORT    Name:  Aubree Cortez  MR#:  [id number]  :  1940  ACCOUNT #:  [de-identified]  DATE OF SERVICE:  2020    PREOPERATIVE DIAGNOSIS:  Bilateral L5-S1 lateral recess stenosis. POSTOPERATIVE DIAGNOSIS:  Bilateral L5-S1 lateral recess stenosis. PROCEDURE PERFORMED:  Bilateral L5-S1 laminectomy, medial partial facetectomy and foraminotomy, CPT code 61144, 83223. SURGEON:  Ernst Longoria. Loan Arce MD    ASSISTANT:  Staff. ANESTHESIA:  GETA. COMPLICATIONS:  None. SPECIMENS REMOVED:  None. IMPLANTS:  None. ESTIMATED BLOOD LOSS:  50 mL. FLUIDS:  300 mL crystalloid. DRAINS:  None. INDICATION:  This patient is an 65-year-old female who previously had an L3-5 laminectomy and fusion and done well but developed leg pain from the knee down to the foot, mainly S1 distribution and somewhat in L5 distribution. MRI scan showed the L5-S1 disc very degenerated and collapsed. It was stable but she developed because of the collapse significant lateral recess stenosis and foraminal narrowing. She got improvement with epidural blocks. Initially, the pain was just on the right side but prior to surgery, she developed left leg pain as well and plan was to bring her to surgery and decompress both the right and left side of L4-S1. PROCEDURE:  The patient was brought to the operating room. After administration of anesthesia, IV antibiotic and placement of monitoring lines, she was positioned prone on the [facility] frame with a sling. Her back was prepped with Betadine and sterilely draped. At this point, surgeon called a time-out and confirmed the procedure to be performed, her allergy history and the fact she had received preoperative IV antibiotics. At this point, we brought the C-arm in and identified the L5-S1 disc space and we made incision over this linking up with the previous incision above.   We dissected down through the subcutaneous tissue with electrocautery to the deep fascia. Deep fascia was incised in the middle and we dissected down identifying the spinous processes of L5 and S1. We dissected down to better visualize this. We placed an angled curette in the interlaminar space, took a lateral C-arm x-ray to confirm we are at L5-S1. After we dissected out to the facet joint, we placed self-retaining retractors and we removed the interspinous ligament as well as a portion of the spinous processes with a rongeur. Once that was done, we used a high-speed bur to bur down the lamina bilaterally in the medial aspect of the facet joint bilaterally. Once we encountered the ligamentum, we used a Kerrison punch to remove the ligamentum flavum, continued the laminectomy bilaterally at L5 and S1, and undercut the facet joint. We identified the exiting L5 and S1 nerves and performed foraminotomies. On the left side, there was more spur coming off the facet joint. On the right side, it was more of bulging disc and facet hypertrophy that were pinching the nerve. We made sure this was all freed up and after we had completed the decompression, the thecal sac was free. The L5 and S1 nerves were free bilaterally. There was epidural bleeding, which we tried to cauterize as best we could with the bipolar. We cauterized bleeders in the soft tissue with the high-speed bur and we placed bone wax over the bleeding bone areas. We placed FloSeal and cottonoids in the decompression site and thoroughly irrigated the wound multiple times. We let the cottonoids sit for approximately 4 minutes and then they were removed. There was still some oozing blood, could not tell if it was bone or epidural veins that were not easily accessible, but it was notably slowed, so we just placed more FloSeal over the decompression site. We placed some vancomycin powder and elected just close the wound. We cauterized all the bleeders we could see in the soft tissue.   We closed the deep fascia muscle layer with interrupted figure-of-eight stitches of #1 Vicryl. We placed some more vancomycin powder on top of this. We anesthetized the skin and subcutaneous tissue with 20 mL 0.5% Marcaine with epinephrine. Subcutaneous tissue was reapproximated with interrupted stitches of 2-0 Vicryl and the skin was closed with a running subcuticular stitch of 3-0 Vicryl. Steri-Strips and dry sterile dressings were applied. The patient was rolled supine onto the recovery room bed having tolerated the procedure well.       Ifrah Rubin MD      [id number]  D:  08/24/2020 11:21  T:  08/24/2020 15:38  JOB #:  [id number]  CC:  Counts include [facility] beds at the [facility]

## 2020-08-24 NOTE — DISCHARGE INSTRUCTIONS
Lumbar Laminectomy for Spinal Stenosis: What to Expect at [address] can expect your back to feel stiff or sore after surgery. This should improve in the weeks after surgery. You may have trouble sitting or standing in one position for very long and may need pain medicine in the weeks after your surgery. Your doctor may advise you to work with a physical therapist to strengthen the muscles around your spine and trunk. You will need to learn how to lift, twist, and bend so that you do not put too much strain on your back. This care sheet gives you a general idea about how long it will take for you to recover. But each person recovers at a different pace. Follow the steps below to get better as quickly as possible. How can you care for yourself at home? Activity  · Rest when you feel tired. Getting enough sleep will help you recover. · Try to walk each day. Start by walking a little more than you did the day before. Bit by bit, increase the amount you walk. Walking boosts blood flow and helps prevent pneumonia and constipation. Walking may also decrease your muscle soreness after surgery. · If advised by your doctor, you may need to avoid lifting anything that would cause excessive strain on your back. This may include a child, heavy grocery bags and milk containers, a heavy briefcase or backpack, cat litter or dog food bags, or a vacuum . · Avoid strenuous activities, such as bicycle riding, jogging, weight lifting, or aerobic exercise, until your doctor says it is okay. · Do not drive for 2 to 4 weeks after your surgery or until your doctor says it is okay. · Avoid riding in a car for more than 30 minutes at a time for 2 to 4 weeks after surgery. If you must ride in a car for a longer distance, stop often to walk and stretch your legs. · Try to change your position about every 30 minutes while sitting or standing. This will help decrease your back pain while you are healing.   · You will probably need to take 4 to 6 weeks off from work. It depends on the type of work you do and how you feel. · You may have sex as soon as you feel able, but avoid positions that put stress on your back or cause pain. Diet  · You can eat your normal diet. If your stomach is upset, try bland, low-fat foods like plain rice, broiled chicken, toast, and yogurt. · Drink plenty of fluids (unless your doctor tells you not to). · You may notice that your bowel movements are not regular right after your surgery. This is common. Try to avoid constipation and straining with bowel movements. You may want to take a fiber supplement every day. If you have not had a bowel movement after a couple of days, ask your doctor about taking a mild laxative. Medicines  · Take pain medicines exactly as directed. ¨ If the doctor gave you a prescription medicine for pain, take it as prescribed. ¨ If you are not taking a prescription pain medicine, ask your doctor if you can take an over-the-counter medicine. · If your doctor prescribed antibiotics, take them as directed. Do not stop taking them just because you feel better. You need to take the full course of antibiotics. · If you think your pain medicine is making you sick to your stomach:  ¨ Take your medicine after meals (unless your doctor has told you not to). ¨ Ask your doctor for a different pain medicine. Incision care  · If you have strips of tape on the cut (incision) the doctor made, leave the tape on for a week or until it falls off. · Wash the area daily with warm, soapy water and pat it dry. · Keep the area clean and dry. You may cover it with a gauze bandage if it weeps or rubs against clothing. Change the bandage every day. Exercise  · Do back exercises as instructed by your doctor. · Your doctor may advise you to work with a physical therapist to improve the strength and flexibility of your back.   Other instructions  · Use a heating pad, a warm water bottle, or a warm cloth on your back to reduce stiffness. Do not go to sleep with a heating pad on your skin. Put a thin cloth between the heating pad and your skin. Follow-up care is a key part of your treatment and safety. Be sure to make and go to all appointments, and call your doctor if you are having problems. Its also a good idea to know your test results and keep a list of the medicines you take. When should you call for help? Call 911 anytime you think you may need emergency care. For example, call if:  · You passed out (lost consciousness). · You have sudden chest pain and shortness of breath, or you cough up blood. · You lose bladder or bowel control. · One or both legs suddenly feel weak or numb. Call your doctor now or seek immediate medical care if:  · You have pain that does not get better after you take pain pills. · You have a headache that does not get better when you take medicine for it. · You have loose stitches, or your incision comes open. · You have blood or fluid draining from the incision. · You have signs of infection, such as:  ¨ Increased pain, swelling, warmth, or redness. ¨ Pus draining from the incision. ¨ A fever. ¨ Red streaks leading from the incision. Watch closely for any changes in your health, and be sure to contact your doctor if:  · You have new numbness or tingling in your legs. · You have new pain or weakness in your legs. · You do not have a bowel movement after taking a laxative. Where can you learn more? Go to [employer]  Enter L153 in the search box to learn more about \"Lumbar Laminectomy for Spinal Stenosis: What to Expect at Home. \"   © 6179-3585 [employer]. Care instructions adapted under license by [employer] (which disclaims liability or warranty for this information).  This care instruction is for use with your licensed healthcare professional. If you have questions about a medical condition or this instruction, always ask your healthcare professional. [place] [address] any warranty or liability for your use of this information. Content Version: 3.8.245824; Last Revised: March 13, 2012  After general anesthesia or intravenous sedation, for 24 hours or while taking prescription Narcotics:  · Limit your activities  · A responsible adult needs to be with you for the next 24 hours  · Do not drive and operate hazardous machinery  · Do not make important personal or business decisions  · Do not drink alcoholic beverages  · If you have not urinated within 8 hours after discharge, please contact your surgeon on call. · If you have sleep apnea and have a CPAP machine, please use it for all naps and sleeping. · Please use caution when taking narcotics and any of your home medications that may cause drowsiness. *  Please give a list of your current medications to your Primary Care Provider. *  Please update this list whenever your medications are discontinued, doses are      changed, or new medications (including over-the-counter products) are added. *  Please carry medication information at all times in case of emergency situations. These are general instructions for a healthy lifestyle:  No smoking/ No tobacco products/ Avoid exposure to second hand smoke  Surgeon General's Warning:  Quitting smoking now greatly reduces serious risk to your health. Obesity, smoking, and sedentary lifestyle greatly increases your risk for illness  A healthy diet, regular physical exercise & weight monitoring are important for maintaining a healthy lifestyle    You may be retaining fluid if you have a history of heart failure or if you experience any of the following symptoms:  Weight gain of 3 pounds or more overnight or 5 pounds in a week, increased swelling in our hands or feet or shortness of breath while lying flat in bed.   Please call your doctor as soon as you notice any of these symptoms; do not wait until your next office visit.

## 2020-08-24 NOTE — ANESTHESIA POSTPROCEDURE EVALUATION
Procedure(s):  BILATERAL  L5-S1 LAMINECTOMY. general    Anesthesia Post Evaluation      Multimodal analgesia: multimodal analgesia used between 6 hours prior to anesthesia start to PACU discharge  Patient location during evaluation: PACU  Patient participation: complete - patient participated  Level of consciousness: awake and alert  Pain management: adequate  Airway patency: patent  Anesthetic complications: no  Cardiovascular status: acceptable  Respiratory status: acceptable  Hydration status: acceptable  Post anesthesia nausea and vomiting:  none      INITIAL Post-op Vital signs:   Vitals Value Taken Time   /81 8/24/2020 12:32 PM   Temp 36.7 °C (98.1 °F) 8/24/2020 11:14 AM   Pulse 59 8/24/2020 12:36 PM   Resp 15 8/24/2020 12:32 PM   SpO2 98 % 8/24/2020 12:36 PM   Vitals shown include unvalidated device data.

## 2020-08-24 NOTE — ANESTHESIA PREPROCEDURE EVALUATION
Relevant Problems   No relevant active problems       Anesthetic History               Review of Systems / Medical History  Patient summary reviewed and pertinent labs reviewed    Pulmonary          Smoker (Former)         Neuro/Psych         TIA (vision)     Cardiovascular    Hypertension  Valvular problems/murmurs: mitral insufficiency        Hyperlipidemia    Exercise tolerance: >4 METS  Comments: ECHO 3/28/19 EF 66% -RVSP 37 \"mild mitral regurgitation, physiologic TR and MV regurgitation\",    GI/Hepatic/Renal     GERD: well controlled           Endo/Other             Other Findings   Comments: Spinal stenosis           Physical Exam    Airway  Mallampati: II  TM Distance: > 6 cm  Neck ROM: normal range of motion   Mouth opening: Normal     Cardiovascular  Regular rate and rhythm,  S1 and S2 normal,  no murmur, click, rub, or gallop             Dental  No notable dental hx       Pulmonary  Breath sounds clear to auscultation               Abdominal         Other Findings            Anesthetic Plan    ASA: 3  Anesthesia type: general            Anesthetic plan and risks discussed with: Patient

## 2020-09-16 ENCOUNTER — HOSPITAL ENCOUNTER (OUTPATIENT)
Dept: PHYSICAL THERAPY | Age: 80
Discharge: HOME | End: 2020-09-16
Payer: MEDICARE

## 2020-09-16 PROCEDURE — 97110 THERAPEUTIC EXERCISES: CPT

## 2020-09-16 PROCEDURE — 97162 PT EVAL MOD COMPLEX 30 MIN: CPT

## 2020-09-16 NOTE — PROGRESS NOTES
Kota Ponce  : 1940  [place] / [place] / Product Type: Medicare /  [address] at [address] [facility]. [address], [address] [place] [address] Road  [phone]   [phone]                                                          Néstor Rider MD      OUTPATIENT PHYSICAL THERAPY: Daily Treatment Note 2020 Visit Count:  1    Tx Diagnosis     ICD-10: Treatment Diagnosis:   Low back pain (M54.5)  Lumbago with Sciatica Left side (M54.42)  Muscle Weakness, Generalized (M62.81)  Muscle spasm of back (M62.830)  Abnormal posture (R29.3)  Pain in right knee (M25.561)                 Precautions/Allergies:   Band aid [adhesive]   Fall Risk Score: 1 (? 5 = High Risk)  MD Orders: Eval and Treat  MEDICAL/REFERRING DIAGNOSIS:  Encounter for follow-up examination after completed treatment for conditions other than malignant neoplasm [Z09]   DATE OF ONSET: 20  REFERRING PHYSICIAN: Néstor Rider MD  RETURN PHYSICIAN APPOINTMENT: 10/9/20           Pre-treatment Symptoms/Complaints:  See Initial Eval Dated 20 for more details. Pain: Initial:8/10 Post Session: 8/10   Medications Last Reviewed:  2020     Updated Objective Findings: See Initial Eval for more details. TREATMENT:   THERAPEUTIC EXERCISE: (25 minutes):  Exercises per grid below to improve mobility, strength and balance. Required minimal visual, verbal and manual cues to promote proper body alignment and promote proper body posture. Progressed resistance and complexity of movement as indicated. Date:  2020 Date:   Date:     Activity/Exercise Parameters Parameters Parameters   Education HEP, POC, PT goals, anatomy/pathology     Lumbar rotation 20x     L stretch 2 mins     SKTC 2x30 \" ea     Forward flex in chair with ext 5x-pain in L LE reproduced                       THERAPEUTIC ACTIVITY: ( 0 minutes):  Activities per gid below to improve functional movement related mobility, strength and balance to improve neuro-muscular carryover to daily functional activities for improving patient's quality of life. Required visual, verbal and manual cues to promote proper body alignment and promote proper body posture/mechanics. Progressed resistance and complexity of movement as indicated. Date:  9/16/2020 Date:   Date:     Activity/Exercise Parameters Parameters Parameters                                                                               MANUAL THERAPY: (0 minutes): Joint mobilization, Soft tissue mobilization was utilized and necessary because of the patient's restricted joint motion and restricted motion of soft tissue mobility. Date  9/16/2020    Technique Used Grade  Level # Time(s) Effect while being performed                                                                 HEP Log Date 1. L stretch 9/16/2020   2. [facility] 9/16/2020   3.lumbar rot 9/16/2020   4.    5.           [facility] Portal  Treatment/Session Summary:    Response to Treatment: Pt demonstrated understanding of POC and initial HEP. No increase in pain or adverse reactions. Communication/Consultation:  POC, HEP, PT goals, Faxed initial evaluation to MD.   Equipment provided today: HEP Handout     Recommendations/Intent for next treatment session:   Next visit will focus on Flexion-based Exercises Core Stability Hip strengthening. Treatment Plan of Care Effective Dates: 9/16/2020 TO 11/15/2020 (60 days).   Frequency/Duration: 2 times a week for 60 Days             Total Treatment Billable Duration:   40  Rx plus Eval   PT Patient Time In/Time Out  Time In: 1050  Time Out: 503 [address]    Future Appointments   Date Time Provider Sunny Burden   11/18/2020  9:00 AM Juani Olea MD [facility]

## 2020-09-16 NOTE — THERAPY EVALUATION
Deidra Ponce  : 1940      [place] / [place] / Product Type: Medicare /  Daljit Light at [address] [place] [address]. [facility], [address], [facility], [address] [place] [address]  [phone]   [phone]       OUTPATIENT PHYSICAL THERAPY:Initial Assessment 2020      ICD-10: Treatment Diagnosis:   Low back pain (M54.5)  Lumbago with Sciatica Left side (M54.42)  Muscle Weakness, Generalized (M62.81)  Muscle spasm of back (M62.830)  Abnormal posture (R29.3)  Pain in right knee (M25.561)                 Precautions/Allergies:   Band aid [adhesive]   Fall Risk Score: 1 (? 5 = High Risk)  MD Orders: Eval and Treat  MEDICAL/REFERRING DIAGNOSIS:  Encounter for follow-up examination after completed treatment for conditions other than malignant neoplasm [Z09]   DATE OF ONSET: 20  REFERRING PHYSICIAN: Paramjit Jackman MD  RETURN PHYSICIAN APPOINTMENT: 10/9/20     INITIAL ASSESSMENT:  Ms. Danny Titus presents to physical therapy with decreased postural and hip/core strength, ROM, joint mobility, flexibility, functional mobility, and increased pain referring down her L LE with L SB and lumbar extension. These S/S are consistent with LBP, L sciatica, muscle weakness, abnormal posture, and pain in R knee. Danny Titus will benefit from skilled physical therapy (medically necessary) to address above deficits affecting participation in basic ADLs and functional mobility/tolerance. Patient will benefit from manual therapeutic techniques (stretching, joint mobilizations, soft tissue mobilization/myofascial release), therapeutic exercises and activities, postural strengthening/education, and comprehensive home exercises program to address current impairments and functional limitations. PROBLEM LIST (Impacting functional limitations):  1. Decreased Strength  2. Decreased ADL/Functional Activities  3. Decreased Transfer Abilities  4.  Decreased Ambulation Ability/Technique  5. Decreased Balance  6. Increased Pain  7. Decreased Activity Tolerance  8. Increased Fatigue  9. Increased Shortness of Breath  10. Decreased Flexibility/Joint Mobility  11. Decreased [place] with Home Exercise Program INTERVENTIONS PLANNED:  1. Balance Exercise  2. Bed Mobility  3. Cold  4. Cryotherapy  5. Electrical Stimulation  6. Family Education  7. Gait Training  8. Heat  9. Home Exercise Program (HEP)  10. Manual Therapy  11. Neuromuscular Re-education/Strengthening  12. Range of Motion (ROM)  13. Therapeutic Activites  14. Therapeutic Exercise/Strengthening  15. Transfer Training  16. Lumbar Traction   TREATMENT PLAN:  Effective Dates: 9/16/2020 TO 11/15/2020 (60 days). Frequency/Duration: 2 times a week for 60 Days  GOALS: (Goals have been discussed and agreed upon with patient.)     Short-Term Goals~4 weeks  Goal Met   1. Nico Lloyd will be independent with HEP 1.  [] Date:   2. Nico Lloyd will participate in LE stretching program to increase flexibility    2. [] Date:   3. Nico Lloyd will participate in core stabilization exercises to help with stabilization during ADLs 3. [] Date:   [address] [place] [address] will participate in LE strengthening program with weights/resistance as appropriate to help with gait and elevations 4. [] Date:   [address] [place] [address] will participate in static and dynamic balance activities to decrease the risk for falls     5. [] Date:   6. Nico Lloyd will tolerate manual therapy/joint mobilizations/soft tissue to increase ROM and decrease pain  6. [] Date:              Long Term Goals~8 weeks Goal Met   1. Nico Lloyd will demonstrate a 10 point improvement on the Oswestry to show improvement in function 1. [] Date:   2. Nico Lloyd will report 0/10 pain at rest and during ADLs  2. [] Date:   3. Nico Lloyd will demonstrate 5/5 LE strength on manual muscle testing 3.   [] Date:   [address] [place] [address] will be able to perform SLS >5 seconds bilaterally to help with gait and improve balance 4. [] Date:                 Outcome Measure: Tool Used: Modified Oswestry Low Back Pain Questionnaire  Score:  Initial: 20/50  Most Recent: X/50 (Date: -- )   Interpretation of Score: Each section is scored on a 0-5 scale, 5 representing the greatest disability. The scores of each section are added together for a total score of 50. Medical Necessity:   · Skilled intervention continues to be required due to above deficits affecting participation in basic ADLs and overall functional tolerance. Reason for Services/Other Comments:  · Patient continues to require skilled intervention due to  above deficits affecting participation in basic ADLs and overall functional tolerance. Total Treatment Duration:40 mins  PT Patient Time In/Time Out  Time In: 1050  Time Out: 5406    Rehabilitation Potential For Stated Goals: GOOD  Regarding Lali Headings Ponce's therapy, I certify that the treatment plan above will be carried out by a therapist or under their direction. Thank you for this referral,  Curt Barney     Referring Physician Signature: Inga Elliott MD              Date                    HISTORY:   History of Present Injury/Illness (Reason for Referral):  Pro Shelton is a [de-identified] y.o post op L5-S1 laminectomy 8/24/20. She had surgery 3 years ago in which she had 3 vertebra fused. She also had a R knee replacement that never felt like it healed properly. She states in January 2020 she started getting cramping and needles and pain down her R leg. She was referred to a neurologist from her PCP who showed her S1 was impinged. After she didn't hear back from anyone else she went to the ER to rule out DVT in R LE and that was neg. She got a referral back to Dr. Raphael Botello and he removed blockage around her S1 joint (B) 2 weeks ago. She states since she has been using her L LE so much since January she now has pain and needles in L LE.  She was sent to PT to try conservative treatment before he fuses S1 to L5-4-3.     -Present symptoms/complaints (on day of evaluation)  Pain Scale:  · Current: 8/10  · Best: 8/10  · Worst: 8/10      · Aggravating factors: patient states it is a constant pain   · Relieving factors: none  · Irritability: High (onset of Pain is shorter than alleviation of Pain)      Past Medical History/Comorbidities:   Ms. Jacob Simental  has a past medical history of Blindness of right eye, Cancer ([facility] [address] (1978), Cardiac murmur, Disc degeneration, lumbar, Dizziness, Fibrocystic disease of breast, GERD (gastroesophageal reflux disease), Hyperlipidemia, Hypertension, Imbalance, Osteoarthritis, Osteoporosis, Sciatica, Spinal stenosis, Squamous cell carcinoma of scalp (2014), TIA (transient ischemic attack) (2008), Tinnitus, and Vertigo. She also has no past medical history of Adverse effect of anesthesia, Difficult intubation, Malignant hyperthermia due to anesthesia, Nausea & vomiting, or Pseudocholinesterase deficiency. Ms. Jacob Simental  has a past surgical history that includes hx hysterectomy (1969); hx other surgical (2015); hx knee replacement (Left, 2007); hx orthopaedic (Right, 2008); hx knee replacement (Right, 2009); hx orthopaedic (2016); hx cataract removal (Bilateral, 2009); hx cyst removal (Right); hx hemorrhoidectomy (1994); hx mastectomy (Bilateral, 1978); hx breast reconstruction (Bilateral, 1978); hx breast reconstruction (Bilateral, 1994); hx hernia repair (Right, 2016); and hx colonoscopy.  laminectomy L5-S1 (8/24/20)  Social History/Living Environment:    Lives alone in a camper that has 4 steps going in with railing    Social History     Socioeconomic History    Marital status:      Spouse name: Not on file    Number of children: Not on file    Years of education: Not on file    Highest education level: Not on file   Occupational History    Not on file   Social Needs    Financial resource strain: Not on file    Food insecurity Worry: Not on file     Inability: Not on file    Transportation needs     Medical: Not on file     Non-medical: Not on file   Tobacco Use    Smoking status: Former Smoker     Packs/day: 0.25     Years: 50.00     Pack years: 12.50     Types: Cigarettes     Last attempt to quit: 10/30/2008     Years since quittin.8    Smokeless tobacco: Never Used   Substance and Sexual Activity    Alcohol use: No     Alcohol/week: 0.0 standard drinks    Drug use: No    Sexual activity: Not on file   Lifestyle    Physical activity     Days per week: Not on file     Minutes per session: Not on file    Stress: Not on file   Relationships    Social connections     Talks on phone: Not on file     Gets together: Not on file     Attends [employer] service: Not on file     Active member of club or organization: Not on file     Attends meetings of clubs or organizations: Not on file     Relationship status: Not on file    Intimate partner violence     Fear of current or ex partner: Not on file     Emotionally abused: Not on file     Physically abused: Not on file     Forced sexual activity: Not on file   Other Topics Concern    Not on file   Social History Narrative    Not on file     Prior Level of Function/Work/Activity:  Normal  Previous Treatment Approach  none  Dominant Side: Left      Active Ambulatory Problems     Diagnosis Date Noted    Hypertension     Fibrocystic disease of breast     Osteoarthritis     Tinea corporis     Hyperlipidemia     TIA (transient ischemic attack)     Cerebral infarction ([facility] [address]     Sciatica     Osteoporosis     Disc degeneration, lumbar     Vertigo     Blindness of right eye     Squamous cell carcinoma of scalp     Spinal stenosis 2016    Spondylolisthesis, lumbar region 2016    Spinal stenosis, lumbar region, with neurogenic claudication 2016    Loss of balance 2017    Left knee pain 2017    S/P laminectomy with spinal fusion 2017    Fall 05/30/2017    Cardiac murmur 07/28/2018    Chronic pain of right knee 07/28/2018    Risk for falls 07/28/2018    Dizzinesses 03/07/2019    Blurred vision 03/07/2019    Acute intractable headache 03/07/2019    Fatigue 03/07/2019    Tremor, coarse 10/12/2019    Tinnitus 10/12/2019    Pain of right hip joint 10/12/2019    Memory changes 10/12/2019     Resolved Ambulatory Problems     Diagnosis Date Noted    No Resolved Ambulatory Problems     Past Medical History:   Diagnosis Date    Cancer ([facility] [address] 1978    Dizziness     GERD (gastroesophageal reflux disease)     Imbalance      Note: Patient denies any increase of symptoms with cough, sneeze or valsalva. Patient denies any saddle paresthesia or bowel/bladder deficits. Current Medications:    Current Outpatient Medications:     cephALEXin (KEFLEX) 500 mg capsule, Take 1 Cap by mouth four (4) times daily. , Disp: 12 Cap, Rfl: 0    quinapriL (ACCUPRIL) 10 mg tablet, Take 1 Tab by mouth daily. , Disp: 90 Tab, Rfl: 3    metoprolol tartrate (LOPRESSOR) 50 mg tablet, Take 1 Tab by mouth two (2) times a day. (Patient taking differently: Take 50 mg by mouth two (2) times a day. Take morning of procedure.), Disp: 180 Tab, Rfl: 3    lovastatin (MEVACOR) 20 mg tablet, Take 1 Tab by mouth nightly. (Patient taking differently: Take 10 mg by mouth nightly.), Disp: 90 Tab, Rfl: 3    aspirin (ASPIRIN) 325 mg tablet, Take 325 mg by mouth every morning. States holding 5 days per  surgeon, Disp: , Rfl:       Ambulatory/Rehab Services H2 Model Falls Risk Assessment    Risk Factors:       No Risk Factors Identified Ability to Rise from Chair:       (1)  Pushes up, successful in one attempt    Falls Prevention Plan:       No modifications necessary   Total: (5 or greater = High Risk): 1    ©2010 [facility] [employer]. All Rights Reserved. [facility] Patent #[id number].  Federal Law prohibits the replication, distribution or use without written permission from [facility] [employer]       Date Last Reviewed:  9/16/2020   Number of Personal Factors/Comorbidities that affect the Plan of Care: 3+: HIGH COMPLEXITY   EXAMINATION:   Observation/Orthostatic Postural Assessment: Forward Head, Rounded Shoulders, Thoracic Kyphosis,  Decreased Lumbar Lordosis and Right Lateral Shift  Palpation:          None noted    ROM:            AROM/PROM         Joint: Eval Date: 9/16/2020  Re-Assess Date:  Re-Assess Date:    Active ROM RIGHT LEFT RIGHT LEFT RIGHT LEFT   Knee Extension         Knee Flexion         Hip Flexion [facility]       Hip Abduction                  Lumbar Flexion 54        Lumbar Extension 10        Lumbar Side-bending approx 15 deg approx 7 deg        Lumbar Rotation           Repeated Motion:  Direction    Frequency Symptoms Prior Symptons Post   Flexion Lateral shifting Tingling in L LE Increased Symptoms               Strength:     Eval Date: 9/16/2020  Re-Assess Date:  Re-Assess Date:      RIGHT LEFT RIGHT LEFT RIGHT LEFT   Knee Flexion (L5-S2)   4+/5  4-/5           Knee Extension (L3, L4)  4+/5  4+/5           Hip Flexion (L1, L2)  3-/5  3-/5           Hip Extension  2+/5  2+/5           Hip Abduction (L5, S1)             Ankle Dorsiflexion (L4)  4/5  4/5           Great Toe Extension (L5)             Ankle Plantar Flexion (S1-S2)                 Special Tests:  SLR: Positive  SLUMP: Positive      Manual:   Eval Date: 9/16/2020   Reasess Date:      Joint/Area Directon Grade Treatment Effect Joint Direction Grade Treatment Effect                                               Neurological Screen:    RADIATING SYMPTOMS: Yes    Reflex Testing:    Location Left Right   Patellar (L4) absent absent              Upper motor [place] screen         Clonus: Negative         Babinski: Not Tested    Functional Mobility:  Affecting participation in basic ADLs and functional tasks.     Balance:     Single Leg Stance: R= <6 seconds L= <6 seconds  TUG=15 seconds no AD  30 second STS= 3   Body Structures Involved:  1. Bones  2. Joints  3. Muscles  4. Ligaments Body Functions Affected:  1. Sensory/Pain  2. Neuromusculoskeletal  3. Movement Related Activities and Participation Affected:  1. Mobility  2.  Self Care   Number of elements that affect the Plan of Care: 3: MODERATE COMPLEXITY   CLINICAL PRESENTATION:   Presentation: Evolving clinical presentation with changing clinical characteristics: MODERATE COMPLEXITY   CLINICAL DECISION MAKING:      Use of outcome tool(s) and clinical judgement create a POC that gives a: Questionable prediction of patient's progress: MODERATE COMPLEXITY     See associated treatment note for treatment provided today      Future Appointments   Date Time Provider Sunny Jenise   11/18/2020  9:00 AM Erma Olea MD [facility]         Nancie Bartholomew

## 2020-11-21 PROBLEM — H52.4 PRESBYOPIA: Status: ACTIVE | Noted: 2020-10-26

## 2021-08-03 PROBLEM — R42 DIZZINESSES: Status: RESOLVED | Noted: 2019-03-07 | Resolved: 2021-08-03

## 2022-03-18 PROBLEM — H93.19 TINNITUS: Status: ACTIVE | Noted: 2019-10-12
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
## 2022-03-18 PROBLEM — R26.89 LOSS OF BALANCE: Status: ACTIVE | Noted: 2017-05-30

## 2022-03-19 PROBLEM — R01.1 CARDIAC MURMUR: Status: ACTIVE | Noted: 2018-07-28

## 2022-03-19 PROBLEM — H52.4 PRESBYOPIA: Status: ACTIVE | Noted: 2020-10-26

## 2022-03-19 PROBLEM — Z98.1 S/P LAMINECTOMY WITH SPINAL FUSION: Status: ACTIVE | Noted: 2017-05-30

## 2022-03-19 PROBLEM — R51.9 ACUTE INTRACTABLE HEADACHE: Status: ACTIVE | Noted: 2019-03-07

## 2022-03-19 PROBLEM — M25.551 PAIN OF RIGHT HIP JOINT: Status: ACTIVE | Noted: 2019-10-12

## 2022-03-19 PROBLEM — M25.562 LEFT KNEE PAIN: Status: ACTIVE | Noted: 2017-05-30

## 2022-03-19 PROBLEM — R53.83 FATIGUE: Status: ACTIVE | Noted: 2019-03-07

## 2022-03-19 PROBLEM — H53.8 BLURRED VISION: Status: ACTIVE | Noted: 2019-03-07

## 2022-03-19 PROBLEM — R41.3 MEMORY CHANGES: Status: ACTIVE | Noted: 2019-10-12

## 2022-03-19 PROBLEM — Z91.81 RISK FOR FALLS: Status: ACTIVE | Noted: 2018-07-28

## 2022-03-20 PROBLEM — G25.2 TREMOR, COARSE: Status: ACTIVE | Noted: 2019-10-12

## 2022-03-20 PROBLEM — G89.29 CHRONIC PAIN OF RIGHT KNEE: Status: ACTIVE | Noted: 2018-07-28

## 2022-03-20 PROBLEM — M25.561 CHRONIC PAIN OF RIGHT KNEE: Status: ACTIVE | Noted: 2018-07-28

## 2022-04-06 PROBLEM — R51.9 ACUTE INTRACTABLE HEADACHE: Status: RESOLVED | Noted: 2019-03-07 | Resolved: 2022-04-06

## 2022-04-06 PROBLEM — H93.19 TINNITUS: Status: RESOLVED | Noted: 2019-10-12 | Resolved: 2022-04-06

## 2022-04-06 PROBLEM — R26.89 LOSS OF BALANCE: Status: RESOLVED | Noted: 2017-05-30 | Resolved: 2022-04-06

## 2022-04-11 PROBLEM — R51.9 ACUTE INTRACTABLE HEADACHE: Status: RESOLVED | Noted: 2019-03-07 | Resolved: 2022-04-06

## 2022-04-11 PROBLEM — H93.19 TINNITUS: Status: RESOLVED | Noted: 2019-10-12 | Resolved: 2022-04-06

## 2022-04-11 PROBLEM — R26.89 LOSS OF BALANCE: Status: RESOLVED | Noted: 2017-05-30 | Resolved: 2022-04-06

## 2022-06-27 DIAGNOSIS — R74.8 ELEVATED ALKALINE PHOSPHATASE LEVEL: ICD-10-CM

## 2022-06-27 DIAGNOSIS — I10 HYPERTENSION, UNSPECIFIED TYPE: Primary | ICD-10-CM

## 2022-06-28 ENCOUNTER — NURSE ONLY (OUTPATIENT)
Dept: INTERNAL MEDICINE CLINIC | Facility: CLINIC | Age: 82
End: 2022-06-28

## 2022-06-28 DIAGNOSIS — R74.8 ELEVATED ALKALINE PHOSPHATASE LEVEL: ICD-10-CM

## 2022-06-28 DIAGNOSIS — I10 HYPERTENSION, UNSPECIFIED TYPE: ICD-10-CM

## 2022-06-28 LAB
ALBUMIN SERPL-MCNC: 3.7 G/DL (ref 3.2–4.6)
ALBUMIN/GLOB SERPL: 1.5 {RATIO} (ref 1.2–3.5)
ALP SERPL-CCNC: 123 U/L (ref 50–136)
ALT SERPL-CCNC: 19 U/L (ref 12–65)
ANION GAP SERPL CALC-SCNC: 5 MMOL/L (ref 7–16)
AST SERPL-CCNC: 14 U/L (ref 15–37)
BILIRUB SERPL-MCNC: 0.7 MG/DL (ref 0.2–1.1)
BUN SERPL-MCNC: 21 MG/DL (ref 8–23)
CALCIUM SERPL-MCNC: 9.1 MG/DL (ref 8.3–10.4)
CHLORIDE SERPL-SCNC: 107 MMOL/L (ref 98–107)
CO2 SERPL-SCNC: 31 MMOL/L (ref 21–32)
CREAT SERPL-MCNC: 0.9 MG/DL (ref 0.6–1)
GLOBULIN SER CALC-MCNC: 2.5 G/DL (ref 2.3–3.5)
GLUCOSE SERPL-MCNC: 78 MG/DL (ref 65–100)
POTASSIUM SERPL-SCNC: 4.6 MMOL/L (ref 3.5–5.1)
PROT SERPL-MCNC: 6.2 G/DL (ref 6.3–8.2)
SODIUM SERPL-SCNC: 143 MMOL/L (ref 136–145)

## 2022-09-09 ENCOUNTER — OFFICE VISIT (OUTPATIENT)
Dept: ORTHOPEDIC SURGERY | Age: 82
End: 2022-09-09
Payer: MEDICARE

## 2022-09-09 DIAGNOSIS — M25.572 ACUTE LEFT ANKLE PAIN: Primary | ICD-10-CM

## 2022-09-09 PROCEDURE — G8428 CUR MEDS NOT DOCUMENT: HCPCS | Performed by: ORTHOPAEDIC SURGERY

## 2022-09-09 PROCEDURE — 99203 OFFICE O/P NEW LOW 30 MIN: CPT | Performed by: ORTHOPAEDIC SURGERY

## 2022-09-09 PROCEDURE — 1090F PRES/ABSN URINE INCON ASSESS: CPT | Performed by: ORTHOPAEDIC SURGERY

## 2022-09-09 PROCEDURE — E0100 CANE ADJUST/FIXED WITH TIP: HCPCS | Performed by: ORTHOPAEDIC SURGERY

## 2022-09-09 PROCEDURE — G8400 PT W/DXA NO RESULTS DOC: HCPCS | Performed by: ORTHOPAEDIC SURGERY

## 2022-09-09 PROCEDURE — G8420 CALC BMI NORM PARAMETERS: HCPCS | Performed by: ORTHOPAEDIC SURGERY

## 2022-09-09 PROCEDURE — 1123F ACP DISCUSS/DSCN MKR DOCD: CPT | Performed by: ORTHOPAEDIC SURGERY

## 2022-09-09 PROCEDURE — 4004F PT TOBACCO SCREEN RCVD TLK: CPT | Performed by: ORTHOPAEDIC SURGERY

## 2022-09-09 NOTE — PROGRESS NOTES
Patient was fitted and instruted on the use of a cane. The cane was adjusted to the patient's height and arm length. Patient walked around with the cane to insure it was set at a comfortable height. I explained that the cane needs to be in the opposite hand of the injury. Patient read and signed documenting they understand and agree to [facility]'s current DME return policy.

## 2022-09-09 NOTE — PROGRESS NOTES
Name: Niru Kulkarni  YOB: 1940  Gender: female  MRN: [id number]    Summary: right midfoot arthritis: Specifically the second third and fourth TMT joints. Voltaren gel, good shoe handout, custom inserts with carbon fiber inserts ordered. She takes 325 aspirin a day and she does not wish to take any nonsteroidal medications. No x-rays needed follow-up in 2 to 3 months. CC: right foot pain    HPI: Niru Kulkarni is a 79 y.o. female who presents today with midfoot pain that wraps around the dorsum of the midfoot. They describe pain as dull ach with some tenderness. It is aggravated by walking, exercise and usually is worse at the end of the day. She has not tried any medications for it. She points over the arch of her foot. She also describes numbness and tingling in go down to the first webspace and in the first and second toes. She states the more she is on it, the more pain causes and the more numbness and tingling she has. She mostly wears flip-flops are these rainbow type shoes that are very flimsy and floppy. She does have some inserts that she had various years ago for her collapse arches. She does not wear them very often. ROS/Meds/PSH/PMH/FH/SH: I personally reviewed the patients standard intake form. Below are the pertinents    Tobacco:  reports that she quit smoking about 13 years ago. She smoked an average of .25 packs per day. She has never used smokeless tobacco.  Diabetes: None  Other: none, hypertension    Physical Examination:  right lower: FROM actively of toes, foot, ankle, knee and hip. No instability of foot or ankle with drawer and stress. 5/5 strength to TA/EHL/GSC/Peroneals/PTib. No skin lesions. No ecchymosis but some edema at the dorsal midfoot. They have TTP throughout the midfoot, centered at midfoot. There  is notsome crepitus with midfoot motion. Positive Tinel's over the anterior tarsal tunnel. normal SILT to s/s/sp/dp/t.   Reflexes normal: 1+ patella reflex bilaterally, 1+ achilles reflex bilaterally, negative babinski bilaterally. no signs of hyper reflexia or absent reflex  Talar tilt exam : normal  Anterior drawer exam w/ ankle plantarflexed at 20 deg: normal  Planovaglus Foot deformity  Pulses  radial=4/4, femoral=4/4, popliteal=4/4, dorsalis pedis=4/4,         Imaging:   I independently interpreted XR taken today and   X-Ray RIGHT Foot 3 vw (AP/Lateral/Oblique) for foot pain   Findings: Planovalgus foot deformity noted. Significant arthrosis and arthritis of the second, third, fourth TMT joints. No signs of acute fractures or dislocations   Impression: Second, third, fourth TMT joint arthritis   Signature: Graeme Chris MD       Assessment:   right midfoot arthritis: Second/third/fourth TMT joint    Plan:   4 This is a chronic illness/condition with exacerbation and progression  Treatment at this time: OTC NSAIDS and custom inserts with carbon fiber component. Recommended good supportive shoe wear  Handout given today regarding her shoe wear. Weight-bearing status: WBAT        Studies ordered: NO XR needed @ Next Visit  Return to work/work restrictions: none  OTC NSAIDs - We discussed using OTC NSAID's or tylenol for inflammation and pain. I discussed reading the bottle and following the instructions. I also briefly discussed how NSAIDs can cause GI irritation and Kidney damage. I also discussed Tylenols effect on the Liver. Follow up No follow-up provider specified. We discussed how over-the-counter nonst eroidals can also affect her bleeding. She states she bleeds well with aspirin.   I told her to take these medications only as needed

## 2022-10-17 ENCOUNTER — OFFICE VISIT (OUTPATIENT)
Dept: INTERNAL MEDICINE CLINIC | Facility: CLINIC | Age: 82
End: 2022-10-17
Payer: MEDICARE

## 2022-10-17 VITALS
RESPIRATION RATE: 14 BRPM | WEIGHT: 136 LBS | OXYGEN SATURATION: 98 % | HEART RATE: 62 BPM | HEIGHT: 65 IN | BODY MASS INDEX: 22.66 KG/M2 | SYSTOLIC BLOOD PRESSURE: 148 MMHG | DIASTOLIC BLOOD PRESSURE: 60 MMHG

## 2022-10-17 DIAGNOSIS — R25.1 TREMOR: ICD-10-CM

## 2022-10-17 DIAGNOSIS — H91.93 HEARING DIFFICULTY OF BOTH EARS: ICD-10-CM

## 2022-10-17 DIAGNOSIS — I10 ESSENTIAL (PRIMARY) HYPERTENSION: Primary | ICD-10-CM

## 2022-10-17 DIAGNOSIS — R26.2 DIFFICULTY IN WALKING, NOT ELSEWHERE CLASSIFIED: ICD-10-CM

## 2022-10-17 DIAGNOSIS — E78.5 HYPERLIPIDEMIA, UNSPECIFIED HYPERLIPIDEMIA TYPE: ICD-10-CM

## 2022-10-17 LAB
BILIRUBIN, URINE, POC: NEGATIVE
BLOOD URINE, POC: NEGATIVE
GLUCOSE URINE, POC: NEGATIVE
KETONES, URINE, POC: NEGATIVE
LEUKOCYTE ESTERASE, URINE, POC: NEGATIVE
NITRITE, URINE, POC: NEGATIVE
PH, URINE, POC: 7.5 (ref 4.6–8)
PROTEIN,URINE, POC: NEGATIVE
SPECIFIC GRAVITY, URINE, POC: 1.02 (ref 1–1.03)
URINALYSIS CLARITY, POC: CLEAR
URINALYSIS COLOR, POC: YELLOW
UROBILINOGEN, POC: NORMAL

## 2022-10-17 PROCEDURE — 1090F PRES/ABSN URINE INCON ASSESS: CPT | Performed by: INTERNAL MEDICINE

## 2022-10-17 PROCEDURE — 1123F ACP DISCUSS/DSCN MKR DOCD: CPT | Performed by: INTERNAL MEDICINE

## 2022-10-17 PROCEDURE — 1036F TOBACCO NON-USER: CPT | Performed by: INTERNAL MEDICINE

## 2022-10-17 PROCEDURE — 81003 URINALYSIS AUTO W/O SCOPE: CPT | Performed by: INTERNAL MEDICINE

## 2022-10-17 PROCEDURE — G8420 CALC BMI NORM PARAMETERS: HCPCS | Performed by: INTERNAL MEDICINE

## 2022-10-17 PROCEDURE — G8427 DOCREV CUR MEDS BY ELIG CLIN: HCPCS | Performed by: INTERNAL MEDICINE

## 2022-10-17 PROCEDURE — G8484 FLU IMMUNIZE NO ADMIN: HCPCS | Performed by: INTERNAL MEDICINE

## 2022-10-17 PROCEDURE — G8400 PT W/DXA NO RESULTS DOC: HCPCS | Performed by: INTERNAL MEDICINE

## 2022-10-17 PROCEDURE — 99214 OFFICE O/P EST MOD 30 MIN: CPT | Performed by: INTERNAL MEDICINE

## 2022-10-17 ASSESSMENT — PATIENT HEALTH QUESTIONNAIRE - PHQ9
SUM OF ALL RESPONSES TO PHQ QUESTIONS 1-9: 0
2. FEELING DOWN, DEPRESSED OR HOPELESS: 0
1. LITTLE INTEREST OR PLEASURE IN DOING THINGS: 0
SUM OF ALL RESPONSES TO PHQ9 QUESTIONS 1 & 2: 0
SUM OF ALL RESPONSES TO PHQ QUESTIONS 1-9: 0

## 2022-10-17 NOTE — PROGRESS NOTES
10/17/2022    Chief Complaint   Patient presents with    Follow-up Chronic Condition       Assessment and plan     1. Essential (primary) hypertension  -     CBC with Auto Differential; Future  -     Comprehensive Metabolic Panel; Future  2. Hyperlipidemia, unspecified hyperlipidemia type  -     Lipid Panel; Future  3. Difficulty in walking, not elsewhere classified  Comments:  Doing well. She will continue to use walker  4. Tremor  Comments:  Counseled  5. Hearing difficulty of both ears  -     [address] [facility], Jen         Follow up   Return for [facility] appt in April 2023. Subjective           HPI :    Patient is here for follow-up visit  Following up on hypertension hyperlipidemia. She has been compliant with medications but she is not checking her blood pressure at home. Denies any medication side effects. Symptoms or signs suggestive of cerebrovascular disease no cardiac disease. Trying to maintain a healthy lifestyle. Musculoskeletal  She has had problems with difficulty in walking. Currently at her baseline. She did not bring her walker today. She denies having any recent fall but says she has almost fallen. She still working outside with her animals. H/o skin cancer  Doing okay. No new lesions. She is no longer seeing skin doctor. Interim History   Joints hurting and stiff. Difficulty making a fist now because of stiffness in her fingers. She says her mind plays tricks on her . Sometimes she has difficulty remembering where she put things [short-term memory loss]      Walkng   No recent falls, but she has had near falls. Still working outside with her animals . Interim History   Had flu shot and another booster   Review of Systems  Left eye getting dim ( blind in right eye)- she plans tp see eye doctor   Difficuty hearing .  She wants to see the ear doctor   She has a mild tremor - asking if it would get worse    Objective     Examination  BP (!) 148/60 (Site: Left Upper Arm, Position: Sitting)   Pulse 62   Resp 14   Ht 5' 5\" (1.651 m)   Wt 136 lb (61.7 kg)   SpO2 98%   BMI 22.63 kg/m²     Physical Exam  General appearance:Well looking , No distress Alert and oriented X 3. Well nourished and well hydrated  Head: Normocephalic, atraumatic  Eyes: conjunctivae clear. Ears: EAC's normal.  No excessive cerumen. TM intact. Neck: Supple, No carotid bruit, no thyromegaly, no adenopathy  Resp: normal effort. Chest clear  Heart: RRR. Normal heart sounds . Abdomen: Soft, non-tender, no masses , no organomegaly. Normal bowel sounds  Extremities: No edema  Neurology: no Focal deficits  Psychology: normal affect.  Mood is normal     MD Tonja Bueno

## 2022-10-18 LAB
ALBUMIN SERPL-MCNC: 3.8 G/DL (ref 3.2–4.6)
ALBUMIN/GLOB SERPL: 1.6 {RATIO} (ref 0.4–1.6)
ALP SERPL-CCNC: 115 U/L (ref 50–136)
ALT SERPL-CCNC: 23 U/L (ref 12–65)
ANION GAP SERPL CALC-SCNC: 7 MMOL/L (ref 2–11)
AST SERPL-CCNC: 14 U/L (ref 15–37)
BILIRUB SERPL-MCNC: 0.7 MG/DL (ref 0.2–1.1)
BUN SERPL-MCNC: 19 MG/DL (ref 8–23)
CALCIUM SERPL-MCNC: 9 MG/DL (ref 8.3–10.4)
CHLORIDE SERPL-SCNC: 110 MMOL/L (ref 101–110)
CHOLEST SERPL-MCNC: 175 MG/DL
CO2 SERPL-SCNC: 26 MMOL/L (ref 21–32)
CREAT SERPL-MCNC: 0.8 MG/DL (ref 0.6–1)
GLOBULIN SER CALC-MCNC: 2.4 G/DL (ref 2.8–4.5)
GLUCOSE SERPL-MCNC: 89 MG/DL (ref 65–100)
HDLC SERPL-MCNC: 66 MG/DL (ref 40–60)
HDLC SERPL: 2.7 {RATIO}
LDLC SERPL CALC-MCNC: 75.8 MG/DL
POTASSIUM SERPL-SCNC: 4 MMOL/L (ref 3.5–5.1)
PROT SERPL-MCNC: 6.2 G/DL (ref 6.3–8.2)
SODIUM SERPL-SCNC: 143 MMOL/L (ref 133–143)
TRIGL SERPL-MCNC: 166 MG/DL (ref 35–150)
VLDLC SERPL CALC-MCNC: 33.2 MG/DL (ref 6–23)

## 2022-10-19 LAB
BASOPHILS # BLD: 0 K/UL (ref 0–0.2)
BASOPHILS NFR BLD: 1 % (ref 0–2)
DIFFERENTIAL METHOD BLD: ABNORMAL
EOSINOPHIL # BLD: 0.2 K/UL (ref 0–0.8)
EOSINOPHIL NFR BLD: 3 % (ref 0.5–7.8)
ERYTHROCYTE [DISTWIDTH] IN BLOOD BY AUTOMATED COUNT: 14.1 % (ref 11.9–14.6)
HCT VFR BLD AUTO: 46.7 % (ref 35.8–46.3)
HGB BLD-MCNC: 14 G/DL (ref 11.7–15.4)
IMM GRANULOCYTES # BLD AUTO: 0 K/UL (ref 0–0.5)
IMM GRANULOCYTES NFR BLD AUTO: 0 % (ref 0–5)
LYMPHOCYTES # BLD: 0.7 K/UL (ref 0.5–4.6)
LYMPHOCYTES NFR BLD: 15 % (ref 13–44)
MCH RBC QN AUTO: 30.4 PG (ref 26.1–32.9)
MCHC RBC AUTO-ENTMCNC: 30 G/DL (ref 31.4–35)
MCV RBC AUTO: 101.3 FL (ref 82–102)
MONOCYTES # BLD: 0.4 K/UL (ref 0.1–1.3)
MONOCYTES NFR BLD: 8 % (ref 4–12)
NEUTS SEG # BLD: 3.4 K/UL (ref 1.7–8.2)
NEUTS SEG NFR BLD: 73 % (ref 43–78)
NRBC # BLD: 0 K/UL (ref 0–0.2)
PLATELET # BLD AUTO: 180 K/UL (ref 150–450)
PMV BLD AUTO: 11.9 FL (ref 9.4–12.3)
RBC # BLD AUTO: 4.61 M/UL (ref 4.05–5.2)
WBC # BLD AUTO: 4.7 K/UL (ref 4.3–11.1)

## 2023-01-30 ENCOUNTER — TELEPHONE (OUTPATIENT)
Dept: INTERNAL MEDICINE CLINIC | Facility: CLINIC | Age: 83
End: 2023-01-30

## 2023-01-30 DIAGNOSIS — I10 ESSENTIAL (PRIMARY) HYPERTENSION: Primary | ICD-10-CM

## 2023-01-30 NOTE — TELEPHONE ENCOUNTER
Patient is wanting to know if it's still needed for her to take Accupril. She said it's not available at the pharmacies anymore, that they don't carry it and before she explores finding which pharmacies do carry, she wants to know if it's really needed anymore.

## 2023-01-31 ENCOUNTER — TELEPHONE (OUTPATIENT)
Dept: INTERNAL MEDICINE CLINIC | Facility: CLINIC | Age: 83
End: 2023-01-31

## 2023-01-31 NOTE — TELEPHONE ENCOUNTER
Patient saying her pharmacy cannot get the medication  RX QUINAPRIL,   Can patient get a replacement for this med?     [address] on [address]

## 2023-02-01 RX ORDER — LISINOPRIL 10 MG/1
10 TABLET ORAL DAILY
Qty: 90 TABLET | Refills: 1 | Status: SHIPPED | OUTPATIENT
Start: 2023-02-01

## 2023-02-01 NOTE — TELEPHONE ENCOUNTER
Yes she still needs to be on blood pressure medication . I will send a prescription to the pharmacy for Lisinopril, which is similar . Please let her know prescription was sent to the pharmacy .

## 2023-10-10 ENCOUNTER — OFFICE VISIT (OUTPATIENT)
Dept: INTERNAL MEDICINE CLINIC | Facility: CLINIC | Age: 83
End: 2023-10-10
Payer: MEDICARE

## 2023-10-10 VITALS
HEIGHT: 65 IN | BODY MASS INDEX: 23.32 KG/M2 | DIASTOLIC BLOOD PRESSURE: 60 MMHG | WEIGHT: 140 LBS | SYSTOLIC BLOOD PRESSURE: 138 MMHG | OXYGEN SATURATION: 99 % | HEART RATE: 85 BPM | TEMPERATURE: 97.6 F | RESPIRATION RATE: 14 BRPM

## 2023-10-10 DIAGNOSIS — M81.0 AGE-RELATED OSTEOPOROSIS WITHOUT CURRENT PATHOLOGICAL FRACTURE: ICD-10-CM

## 2023-10-10 DIAGNOSIS — M54.2 CHRONIC NECK PAIN: ICD-10-CM

## 2023-10-10 DIAGNOSIS — G89.29 CHRONIC NECK PAIN: ICD-10-CM

## 2023-10-10 DIAGNOSIS — I10 ESSENTIAL (PRIMARY) HYPERTENSION: Primary | ICD-10-CM

## 2023-10-10 DIAGNOSIS — Z23 NEED FOR INFLUENZA VACCINATION: ICD-10-CM

## 2023-10-10 DIAGNOSIS — Z78.0 ASYMPTOMATIC MENOPAUSAL STATE: ICD-10-CM

## 2023-10-10 DIAGNOSIS — E78.5 HYPERLIPIDEMIA, UNSPECIFIED HYPERLIPIDEMIA TYPE: ICD-10-CM

## 2023-10-10 DIAGNOSIS — M54.9 TENDERNESS OVER SPINE: ICD-10-CM

## 2023-10-10 DIAGNOSIS — R25.1 TREMOR: ICD-10-CM

## 2023-10-10 DIAGNOSIS — R68.89 OTHER GENERAL SYMPTOMS AND SIGNS: ICD-10-CM

## 2023-10-10 DIAGNOSIS — H54.40 BLINDNESS OF RIGHT EYE, UNSPECIFIED LEFT EYE VISUAL IMPAIRMENT CATEGORY: ICD-10-CM

## 2023-10-10 LAB
BASOPHILS # BLD: 0 K/UL (ref 0–0.2)
BASOPHILS NFR BLD: 1 % (ref 0–2)
DIFFERENTIAL METHOD BLD: ABNORMAL
EOSINOPHIL # BLD: 0.2 K/UL (ref 0–0.8)
EOSINOPHIL NFR BLD: 5 % (ref 0.5–7.8)
ERYTHROCYTE [DISTWIDTH] IN BLOOD BY AUTOMATED COUNT: 13.7 % (ref 11.9–14.6)
HCT VFR BLD AUTO: 44.3 % (ref 35.8–46.3)
HGB BLD-MCNC: 13.8 G/DL (ref 11.7–15.4)
IMM GRANULOCYTES # BLD AUTO: 0 K/UL (ref 0–0.5)
IMM GRANULOCYTES NFR BLD AUTO: 0 % (ref 0–5)
LYMPHOCYTES # BLD: 0.8 K/UL (ref 0.5–4.6)
LYMPHOCYTES NFR BLD: 16 % (ref 13–44)
MCH RBC QN AUTO: 29.6 PG (ref 26.1–32.9)
MCHC RBC AUTO-ENTMCNC: 31.2 G/DL (ref 31.4–35)
MCV RBC AUTO: 95.1 FL (ref 82–102)
MONOCYTES # BLD: 0.5 K/UL (ref 0.1–1.3)
MONOCYTES NFR BLD: 9 % (ref 4–12)
NEUTS SEG # BLD: 3.6 K/UL (ref 1.7–8.2)
NEUTS SEG NFR BLD: 69 % (ref 43–78)
NRBC # BLD: 0 K/UL (ref 0–0.2)
PLATELET # BLD AUTO: 160 K/UL (ref 150–450)
PMV BLD AUTO: 11.5 FL (ref 9.4–12.3)
RBC # BLD AUTO: 4.66 M/UL (ref 4.05–5.2)
WBC # BLD AUTO: 5.2 K/UL (ref 4.3–11.1)

## 2023-10-10 PROCEDURE — 90694 VACC AIIV4 NO PRSRV 0.5ML IM: CPT | Performed by: INTERNAL MEDICINE

## 2023-10-10 PROCEDURE — 1090F PRES/ABSN URINE INCON ASSESS: CPT | Performed by: INTERNAL MEDICINE

## 2023-10-10 PROCEDURE — 3078F DIAST BP <80 MM HG: CPT | Performed by: INTERNAL MEDICINE

## 2023-10-10 PROCEDURE — G8420 CALC BMI NORM PARAMETERS: HCPCS | Performed by: INTERNAL MEDICINE

## 2023-10-10 PROCEDURE — 99214 OFFICE O/P EST MOD 30 MIN: CPT | Performed by: INTERNAL MEDICINE

## 2023-10-10 PROCEDURE — G0008 ADMIN INFLUENZA VIRUS VAC: HCPCS | Performed by: INTERNAL MEDICINE

## 2023-10-10 PROCEDURE — 1123F ACP DISCUSS/DSCN MKR DOCD: CPT | Performed by: INTERNAL MEDICINE

## 2023-10-10 PROCEDURE — G8427 DOCREV CUR MEDS BY ELIG CLIN: HCPCS | Performed by: INTERNAL MEDICINE

## 2023-10-10 PROCEDURE — 3074F SYST BP LT 130 MM HG: CPT | Performed by: INTERNAL MEDICINE

## 2023-10-10 PROCEDURE — G8400 PT W/DXA NO RESULTS DOC: HCPCS | Performed by: INTERNAL MEDICINE

## 2023-10-10 PROCEDURE — G8484 FLU IMMUNIZE NO ADMIN: HCPCS | Performed by: INTERNAL MEDICINE

## 2023-10-10 PROCEDURE — 1036F TOBACCO NON-USER: CPT | Performed by: INTERNAL MEDICINE

## 2023-10-10 RX ORDER — LOVASTATIN 10 MG/1
10 TABLET ORAL NIGHTLY
Qty: 90 TABLET | Refills: 2 | Status: SHIPPED | OUTPATIENT
Start: 2023-10-10

## 2023-10-10 RX ORDER — LISINOPRIL 10 MG/1
10 TABLET ORAL DAILY
Qty: 90 TABLET | Refills: 3 | Status: SHIPPED | OUTPATIENT
Start: 2023-10-10

## 2023-10-10 RX ORDER — METOPROLOL TARTRATE 50 MG/1
50 TABLET, FILM COATED ORAL 2 TIMES DAILY
Qty: 180 TABLET | Refills: 2 | Status: SHIPPED | OUTPATIENT
Start: 2023-10-10

## 2023-10-10 ASSESSMENT — PATIENT HEALTH QUESTIONNAIRE - PHQ9
SUM OF ALL RESPONSES TO PHQ QUESTIONS 1-9: 0
SUM OF ALL RESPONSES TO PHQ QUESTIONS 1-9: 0
SUM OF ALL RESPONSES TO PHQ9 QUESTIONS 1 & 2: 0
SUM OF ALL RESPONSES TO PHQ QUESTIONS 1-9: 0
1. LITTLE INTEREST OR PLEASURE IN DOING THINGS: 0
2. FEELING DOWN, DEPRESSED OR HOPELESS: 0
SUM OF ALL RESPONSES TO PHQ QUESTIONS 1-9: 0

## 2023-10-10 NOTE — PROGRESS NOTES
10/10/2023    Chief Complaint   Patient presents with    Follow-up     Follow-up chronic medical conditions. Also complains of worsening neck pain       Assessment and plan     1. Essential (primary) hypertension  -     lisinopril (PRINIVIL;ZESTRIL) 10 MG tablet; Take 1 tablet by mouth daily, Disp-90 tablet, R-3Normal  -     metoprolol tartrate (LOPRESSOR) 50 MG tablet; Take 1 tablet by mouth 2 times daily, Disp-180 tablet, R-2Normal  -     CBC with Auto Differential; Future  -     Comprehensive Metabolic Panel; Future  2. Hyperlipidemia, unspecified hyperlipidemia type  -     lovastatin (MEVACOR) 10 MG tablet; Take 1 tablet by mouth nightly, Disp-90 tablet, R-2Normal  3. Need for influenza vaccination  4. Blindness of right eye, unspecified left eye visual impairment category  5. Age-related osteoporosis without current pathological fracture  6. Tremor  7. Chronic neck pain  -     Elastic Bandages & Supports (FUTURO SOFT CERVICAL COLLAR) MISC; Disp-1 each, R-0, PrintUse as directed  -     XR CERVICAL SPINE FLEXION AND EXTENSION; Future  -     XR THORACIC SPINE (3 VIEWS); Future  8. Other general symptoms and signs  -     Vitamin B12; Future  9. Asymptomatic menopausal state  -     XR CERVICAL SPINE FLEXION AND EXTENSION; Future  -     XR THORACIC SPINE (3 VIEWS); Future  10. Tenderness over spine  Comments:  T1  Orders:  -     XR CERVICAL SPINE FLEXION AND EXTENSION; Future  -     XR THORACIC SPINE (3 VIEWS); Future     Stable in the office today. For musculoskeletal complaints she will contact orthopedics for an appointment. I have ordered x-rays for her neck and spine. She will call for any worsening of any symptoms. Follow up   Return in about 3 months (around 1/10/2024) for EOV. Subjective           HPI :    Ms. Radha Richards is here for follow-up appointment. Neck Pain   C/O Neck pain Getting worse as she gets older. Pain is bad and she thinks it is also causing pressure in her back.   Aggravated by

## 2023-10-11 LAB
ALBUMIN SERPL-MCNC: 3.6 G/DL (ref 3.2–4.6)
ALBUMIN/GLOB SERPL: 1.2 (ref 0.4–1.6)
ALP SERPL-CCNC: 126 U/L (ref 50–136)
ALT SERPL-CCNC: 20 U/L (ref 12–65)
ANION GAP SERPL CALC-SCNC: 5 MMOL/L (ref 2–11)
AST SERPL-CCNC: 16 U/L (ref 15–37)
BILIRUB SERPL-MCNC: 0.5 MG/DL (ref 0.2–1.1)
BUN SERPL-MCNC: 27 MG/DL (ref 8–23)
CALCIUM SERPL-MCNC: 9 MG/DL (ref 8.3–10.4)
CHLORIDE SERPL-SCNC: 112 MMOL/L (ref 101–110)
CO2 SERPL-SCNC: 29 MMOL/L (ref 21–32)
CREAT SERPL-MCNC: 0.9 MG/DL (ref 0.6–1)
GLOBULIN SER CALC-MCNC: 3.1 G/DL (ref 2.8–4.5)
GLUCOSE SERPL-MCNC: 158 MG/DL (ref 65–100)
POTASSIUM SERPL-SCNC: 3.9 MMOL/L (ref 3.5–5.1)
PROT SERPL-MCNC: 6.7 G/DL (ref 6.3–8.2)
SODIUM SERPL-SCNC: 146 MMOL/L (ref 133–143)
VIT B12 SERPL-MCNC: 287 PG/ML (ref 193–986)

## 2023-10-27 ENCOUNTER — OFFICE VISIT (OUTPATIENT)
Dept: ORTHOPEDIC SURGERY | Age: 83
End: 2023-10-27

## 2023-10-27 VITALS — HEIGHT: 65 IN | BODY MASS INDEX: 23.32 KG/M2 | WEIGHT: 140 LBS

## 2023-10-27 DIAGNOSIS — M54.2 NECK PAIN: Primary | ICD-10-CM

## 2023-10-27 DIAGNOSIS — M50.30 DDD (DEGENERATIVE DISC DISEASE), CERVICAL: ICD-10-CM

## 2023-10-27 DIAGNOSIS — M48.061 SPINAL STENOSIS OF LUMBAR REGION WITHOUT NEUROGENIC CLAUDICATION: ICD-10-CM

## 2023-10-27 RX ORDER — METHYLPREDNISOLONE 4 MG/1
TABLET ORAL
Qty: 1 KIT | Refills: 0 | Status: SHIPPED | OUTPATIENT
Start: 2023-10-27
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
## 2023-11-08 DIAGNOSIS — R79.89 LOW VITAMIN B12 LEVEL: Primary | ICD-10-CM

## 2024-02-20 ENCOUNTER — OFFICE VISIT (OUTPATIENT)
Dept: INTERNAL MEDICINE CLINIC | Facility: CLINIC | Age: 84
End: 2024-02-20
Payer: MEDICARE

## 2024-02-20 VITALS
TEMPERATURE: 96.8 F | BODY MASS INDEX: 22.49 KG/M2 | HEIGHT: 65 IN | HEART RATE: 57 BPM | DIASTOLIC BLOOD PRESSURE: 78 MMHG | OXYGEN SATURATION: 94 % | WEIGHT: 135 LBS | SYSTOLIC BLOOD PRESSURE: 122 MMHG

## 2024-02-20 DIAGNOSIS — H57.89: ICD-10-CM

## 2024-02-20 DIAGNOSIS — E78.5 HYPERLIPIDEMIA, UNSPECIFIED HYPERLIPIDEMIA TYPE: ICD-10-CM

## 2024-02-20 DIAGNOSIS — R41.3 MEMORY CHANGE: ICD-10-CM

## 2024-02-20 DIAGNOSIS — R68.89 OTHER GENERAL SYMPTOMS AND SIGNS: ICD-10-CM

## 2024-02-20 DIAGNOSIS — I10 ESSENTIAL (PRIMARY) HYPERTENSION: Primary | ICD-10-CM

## 2024-02-20 DIAGNOSIS — Z91.81 HISTORY OF FALLING: ICD-10-CM

## 2024-02-20 DIAGNOSIS — H54.40 BLINDNESS OF RIGHT EYE, UNSPECIFIED LEFT EYE VISUAL IMPAIRMENT CATEGORY: ICD-10-CM

## 2024-02-20 LAB
BASOPHILS # BLD: 0 K/UL (ref 0–0.2)
BASOPHILS NFR BLD: 1 % (ref 0–2)
DIFFERENTIAL METHOD BLD: ABNORMAL
EOSINOPHIL # BLD: 0.2 K/UL (ref 0–0.8)
EOSINOPHIL NFR BLD: 4 % (ref 0.5–7.8)
ERYTHROCYTE [DISTWIDTH] IN BLOOD BY AUTOMATED COUNT: 13.1 % (ref 11.9–14.6)
HCT VFR BLD AUTO: 46.5 % (ref 35.8–46.3)
HGB BLD-MCNC: 15 G/DL (ref 11.7–15.4)
IMM GRANULOCYTES # BLD AUTO: 0 K/UL (ref 0–0.5)
IMM GRANULOCYTES NFR BLD AUTO: 0 % (ref 0–5)
LYMPHOCYTES # BLD: 0.8 K/UL (ref 0.5–4.6)
LYMPHOCYTES NFR BLD: 15 % (ref 13–44)
MCH RBC QN AUTO: 29.6 PG (ref 26.1–32.9)
MCHC RBC AUTO-ENTMCNC: 32.3 G/DL (ref 31.4–35)
MCV RBC AUTO: 91.9 FL (ref 82–102)
MONOCYTES # BLD: 0.4 K/UL (ref 0.1–1.3)
MONOCYTES NFR BLD: 8 % (ref 4–12)
NEUTS SEG # BLD: 3.9 K/UL (ref 1.7–8.2)
NEUTS SEG NFR BLD: 72 % (ref 43–78)
NRBC # BLD: 0 K/UL (ref 0–0.2)
PLATELET # BLD AUTO: 165 K/UL (ref 150–450)
PMV BLD AUTO: 11.6 FL (ref 9.4–12.3)
RBC # BLD AUTO: 5.06 M/UL (ref 4.05–5.2)
WBC # BLD AUTO: 5.4 K/UL (ref 4.3–11.1)

## 2024-02-20 PROCEDURE — G8420 CALC BMI NORM PARAMETERS: HCPCS | Performed by: INTERNAL MEDICINE

## 2024-02-20 PROCEDURE — 1123F ACP DISCUSS/DSCN MKR DOCD: CPT | Performed by: INTERNAL MEDICINE

## 2024-02-20 PROCEDURE — G8427 DOCREV CUR MEDS BY ELIG CLIN: HCPCS | Performed by: INTERNAL MEDICINE

## 2024-02-20 PROCEDURE — 3078F DIAST BP <80 MM HG: CPT | Performed by: INTERNAL MEDICINE

## 2024-02-20 PROCEDURE — 99214 OFFICE O/P EST MOD 30 MIN: CPT | Performed by: INTERNAL MEDICINE

## 2024-02-20 PROCEDURE — G8400 PT W/DXA NO RESULTS DOC: HCPCS | Performed by: INTERNAL MEDICINE

## 2024-02-20 PROCEDURE — 1036F TOBACCO NON-USER: CPT | Performed by: INTERNAL MEDICINE

## 2024-02-20 PROCEDURE — G8484 FLU IMMUNIZE NO ADMIN: HCPCS | Performed by: INTERNAL MEDICINE

## 2024-02-20 PROCEDURE — 3074F SYST BP LT 130 MM HG: CPT | Performed by: INTERNAL MEDICINE

## 2024-02-20 PROCEDURE — 1090F PRES/ABSN URINE INCON ASSESS: CPT | Performed by: INTERNAL MEDICINE

## 2024-02-20 RX ORDER — METOPROLOL TARTRATE 50 MG/1
50 TABLET, FILM COATED ORAL 2 TIMES DAILY
Qty: 180 TABLET | Refills: 2 | Status: SHIPPED | OUTPATIENT
Start: 2024-02-20

## 2024-02-20 ASSESSMENT — PATIENT HEALTH QUESTIONNAIRE - PHQ9
SUM OF ALL RESPONSES TO PHQ QUESTIONS 1-9: 0
2. FEELING DOWN, DEPRESSED OR HOPELESS: 0
SUM OF ALL RESPONSES TO PHQ9 QUESTIONS 1 & 2: 0
SUM OF ALL RESPONSES TO PHQ QUESTIONS 1-9: 0
1. LITTLE INTEREST OR PLEASURE IN DOING THINGS: 0

## 2024-02-20 NOTE — PATIENT INSTRUCTIONS
Monitor BP at home   Goal for blood pressure is less than 130/80 mmHg   Call office if BP is persistnetly Greater than 130/80mmHg

## 2024-02-20 NOTE — PROGRESS NOTES
2/20/2024    Chief Complaint   Patient presents with    Follow-up       Assessment and plan     1. Essential (primary) hypertension  -     CBC with Auto Differential; Future  -     Comprehensive Metabolic Panel; Future  -     metoprolol tartrate (LOPRESSOR) 50 MG tablet; Take 1 tablet by mouth 2 times daily, Disp-180 tablet, R-2Normal  2. Hyperlipidemia, unspecified hyperlipidemia type  -     Lipid Panel; Future  3. Blindness of right eye, unspecified left eye visual impairment category  4. History of falling  Comments:  No recent falls.  reminded to take caution with her activities to reduce her risk of falls.  5. Memory change  -     Vitamin B12; Future  -     TSH; Future  -     Lipid Panel; Future  -     T. pallidum Ab; Future  6. Other general symptoms and signs  -     Vitamin B12; Future  7. Sensation of irritation of eye proper  Comments:  Right eye. Normal Examination . Recommend moisturixing eye drops. She should call eye doctor     Patient is with her daughter today.  New concern about her memory will warrant further evaluation.  Daughter is encouraged to the patient with her medications and other activities.  Fall prevention discussed with them both.  Blood pressure is good in the office today.  I have made no changes in her medications.  Memory changes as noted.  Blood work to be done today to look for any secondary cause.  Consider other workup pending results and patient's willingness.  She has been very steadfast with her independence and cognition.  She still maintains a good level of function.  Today she was very upset with respect to her experience at the eye doctor.  I have encouraged them both to contact the ophthalmologist office to find out  more about her visit and what transpired at that visit.      Follow up   Return in about 6 weeks (around 4/2/2024) for Virtual Visit extended. Memory and BP        Subjective           HPI :    Patient is here for follow-up visit.  She is accompanied by her

## 2024-02-21 LAB
ALBUMIN SERPL-MCNC: 3.9 G/DL (ref 3.2–4.6)
ALBUMIN/GLOB SERPL: 1.3 (ref 0.4–1.6)
ALP SERPL-CCNC: 121 U/L (ref 50–136)
ALT SERPL-CCNC: 22 U/L (ref 12–65)
ANION GAP SERPL CALC-SCNC: 7 MMOL/L (ref 2–11)
AST SERPL-CCNC: 18 U/L (ref 15–37)
BILIRUB SERPL-MCNC: 0.6 MG/DL (ref 0.2–1.1)
BUN SERPL-MCNC: 25 MG/DL (ref 8–23)
CALCIUM SERPL-MCNC: 9.2 MG/DL (ref 8.3–10.4)
CHLORIDE SERPL-SCNC: 111 MMOL/L (ref 103–113)
CHOLEST SERPL-MCNC: 164 MG/DL
CO2 SERPL-SCNC: 27 MMOL/L (ref 21–32)
CREAT SERPL-MCNC: 0.8 MG/DL (ref 0.6–1)
GLOBULIN SER CALC-MCNC: 3 G/DL (ref 2.8–4.5)
GLUCOSE SERPL-MCNC: 81 MG/DL (ref 65–100)
HDLC SERPL-MCNC: 78 MG/DL (ref 40–60)
HDLC SERPL: 2.1
LDLC SERPL CALC-MCNC: 69 MG/DL
POTASSIUM SERPL-SCNC: 3.8 MMOL/L (ref 3.5–5.1)
PROT SERPL-MCNC: 6.9 G/DL (ref 6.3–8.2)
SODIUM SERPL-SCNC: 145 MMOL/L (ref 136–146)
TRIGL SERPL-MCNC: 85 MG/DL (ref 35–150)
TSH, 3RD GENERATION: 2.9 UIU/ML (ref 0.36–3.74)
VIT B12 SERPL-MCNC: 1281 PG/ML (ref 193–986)
VLDLC SERPL CALC-MCNC: 17 MG/DL (ref 6–23)

## 2024-02-22 LAB — T PALLIDUM AB SER QL IA: NON REACTIVE

## 2024-02-26 ENCOUNTER — TELEPHONE (OUTPATIENT)
Dept: INTERNAL MEDICINE CLINIC | Facility: CLINIC | Age: 84
End: 2024-02-26

## 2024-02-26 NOTE — TELEPHONE ENCOUNTER
Patient 's daughter, Anna informed of test results.    I also informed her that the ophthalmologist note had mentioned removal of an open suture from her eye at her last visit.  This is a foreign body that was removed during her last visit.    I recommended [facility] to [facility] as well as for driving evaluation since Anna is concerned that her mother does not need to drive and wanted to stop driving.    Anna prefers to discuss my recommendations with Mrs. Ta before decision is made.

## 2024-04-02 ENCOUNTER — OFFICE VISIT (OUTPATIENT)
Dept: INTERNAL MEDICINE CLINIC | Facility: CLINIC | Age: 84
End: 2024-04-02

## 2024-04-02 VITALS
WEIGHT: 141.5 LBS | SYSTOLIC BLOOD PRESSURE: 115 MMHG | BODY MASS INDEX: 23.57 KG/M2 | OXYGEN SATURATION: 97 % | TEMPERATURE: 97.1 F | DIASTOLIC BLOOD PRESSURE: 60 MMHG | HEIGHT: 65 IN | HEART RATE: 60 BPM | RESPIRATION RATE: 14 BRPM

## 2024-04-02 DIAGNOSIS — R41.3 MEMORY CHANGE: Primary | ICD-10-CM

## 2024-04-02 DIAGNOSIS — M48.062 SPINAL STENOSIS, LUMBAR REGION, WITH NEUROGENIC CLAUDICATION: ICD-10-CM

## 2024-04-02 DIAGNOSIS — Z98.1 S/P LAMINECTOMY WITH SPINAL FUSION: ICD-10-CM

## 2024-04-02 SDOH — ECONOMIC STABILITY: FOOD INSECURITY: WITHIN THE PAST 12 MONTHS, YOU WORRIED THAT YOUR FOOD WOULD RUN OUT BEFORE YOU GOT MONEY TO BUY MORE.: NEVER TRUE

## 2024-04-02 SDOH — ECONOMIC STABILITY: HOUSING INSECURITY
IN THE LAST 12 MONTHS, WAS THERE A TIME WHEN YOU DID NOT HAVE A STEADY PLACE TO SLEEP OR SLEPT IN A SHELTER (INCLUDING NOW)?: NO

## 2024-04-02 SDOH — ECONOMIC STABILITY: FOOD INSECURITY: WITHIN THE PAST 12 MONTHS, THE FOOD YOU BOUGHT JUST DIDN'T LAST AND YOU DIDN'T HAVE MONEY TO GET MORE.: NEVER TRUE

## 2024-04-02 SDOH — ECONOMIC STABILITY: INCOME INSECURITY: HOW HARD IS IT FOR YOU TO PAY FOR THE VERY BASICS LIKE FOOD, HOUSING, MEDICAL CARE, AND HEATING?: NOT HARD AT ALL

## 2024-04-02 ASSESSMENT — PATIENT HEALTH QUESTIONNAIRE - PHQ9
SUM OF ALL RESPONSES TO PHQ9 QUESTIONS 1 & 2: 0
SUM OF ALL RESPONSES TO PHQ QUESTIONS 1-9: 0
SUM OF ALL RESPONSES TO PHQ QUESTIONS 1-9: 0
2. FEELING DOWN, DEPRESSED OR HOPELESS: NOT AT ALL
SUM OF ALL RESPONSES TO PHQ QUESTIONS 1-9: 0
1. LITTLE INTEREST OR PLEASURE IN DOING THINGS: NOT AT ALL
SUM OF ALL RESPONSES TO PHQ QUESTIONS 1-9: 0

## 2024-04-02 NOTE — PROGRESS NOTES
4/2/2024    Chief Complaint   Patient presents with    Memory Loss     Follow up        Assessment and plan     1. Memory change  -     [facility]  2. Spinal stenosis, lumbar region, with neurogenic claudication  -     Ambulatory referral to Orthopedic Surgery  3. S/P laminectomy with spinal fusion  -     Ambulatory referral to Orthopedic Surgery     Memory changes: Test results are normal . Discussed further work up /evaluation: Explained that I thought it was necessary to fully evaluate to fully understand the nature of her memory loss.  This may determine approach to management.  Recommend having an MRI however neither individual was favorable to having MRI scan done at this time.  I recommended referral to [facility] and getting a driving assessment to determine her ability to drive. Referral to neurologist. They favored referral to neurology and declined referral to [facility] and driving assessment .    I recommended that patient should not drive given her memory changes and her daughter has reported that patient sometimes cannot remember where she is going when she is driving.    Patient was informed that reviewing notes from Ophthalmologist, He had removed a suture from her right eye at her last visit.        On 4/2/2024  I  have spent 40 minutes reviewing previous notes, test results and face to face with the patient discussing the diagnosis and recommendations for  evaluation. We discussed [facility], the need for assessment for suitability for driving and importance of compliance with the treatment plan as well as documenting on the day of the visit.      Follow up   Return in about 4 months (around 8/2/2024) for Chronic visit follow up, EOV.         Subjective           HPI :   Patient is here to follow-up on her memory and to review recent blood work.  She is accompanied by her daughter.      Memory changes  Patient

## 2024-04-25 ENCOUNTER — TELEPHONE (OUTPATIENT)
Dept: ORTHOPEDIC SURGERY | Age: 84
End: 2024-04-25

## 2024-04-25 ENCOUNTER — OFFICE VISIT (OUTPATIENT)
Dept: ORTHOPEDIC SURGERY | Age: 84
End: 2024-04-25
Payer: MEDICARE

## 2024-04-25 VITALS — HEIGHT: 64 IN | BODY MASS INDEX: 24.45 KG/M2 | WEIGHT: 143.2 LBS

## 2024-04-25 DIAGNOSIS — M54.50 LOW BACK PAIN, UNSPECIFIED BACK PAIN LATERALITY, UNSPECIFIED CHRONICITY, UNSPECIFIED WHETHER SCIATICA PRESENT: Primary | ICD-10-CM

## 2024-04-25 DIAGNOSIS — M48.062 SPINAL STENOSIS OF LUMBAR REGION WITH NEUROGENIC CLAUDICATION: ICD-10-CM

## 2024-04-25 DIAGNOSIS — M47.812 CERVICAL FACET JOINT SYNDROME: ICD-10-CM

## 2024-04-25 DIAGNOSIS — M50.30 DDD (DEGENERATIVE DISC DISEASE), CERVICAL: ICD-10-CM

## 2024-04-25 PROCEDURE — 1123F ACP DISCUSS/DSCN MKR DOCD: CPT | Performed by: NURSE PRACTITIONER

## 2024-04-25 PROCEDURE — 99213 OFFICE O/P EST LOW 20 MIN: CPT | Performed by: NURSE PRACTITIONER

## 2024-04-25 PROCEDURE — 1036F TOBACCO NON-USER: CPT | Performed by: NURSE PRACTITIONER

## 2024-04-25 PROCEDURE — 1090F PRES/ABSN URINE INCON ASSESS: CPT | Performed by: NURSE PRACTITIONER

## 2024-04-25 PROCEDURE — G8428 CUR MEDS NOT DOCUMENT: HCPCS | Performed by: NURSE PRACTITIONER

## 2024-04-25 PROCEDURE — G8420 CALC BMI NORM PARAMETERS: HCPCS | Performed by: NURSE PRACTITIONER

## 2024-04-25 PROCEDURE — G8400 PT W/DXA NO RESULTS DOC: HCPCS | Performed by: NURSE PRACTITIONER

## 2024-04-25 NOTE — TELEPHONE ENCOUNTER
Her daughter is calling because she wasn't made aware of the appointment this morning and she is wondering if there is any way you can call her when Amol goes in the room because her mom isn't good at comprehending things and she just found out about the appointment.

## 2024-04-25 NOTE — PROGRESS NOTES
Name: Chloe Ta  YOB: 1940  Gender: female  MRN: [id number]    CC: Back and neck pain       HPI: This is a 83 y.o. year old female who is a previous patient of Dr. Lazcano.  She has had an L3-L5 Lami fusion in 2016 with laminectomy in 2020.  She has complaints of low back pain radiating into the right lateral leg down to the knee.  She also describes it as numbness.  She has numbness in both feet.  She has bilateral knee pain.  She also has neck pain.  She states she sleeps in a brace for her neck and lower back which helps.  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  She recently saw Dr. Lazcano in October 2023.  X-rays of the cervical spine had revealed extensive cervical facet arthropathy.  It was felt that some of her lumbar symptoms are coming from her L2-3 spinal stenosis.  She was given a Medrol Dosepak and sent for physical therapy.  She returns back with increasing complaints of neck pain and leg pain as described above.      This patient  has had lumbar surgery in the past.     Prior treatment: Physical therapy, steroids, surgery                 4/25/2024    11:25 AM   AMB PAIN ASSESSMENT   Location of Pain Back   Frequency of Pain Constant   Limiting Behavior Yes   Result of Injury No   Work-Related Injury No   Are there other pain locations you wish to document? No            ROS/Meds/PSH/PMH/FH/SH: I personally reviewed the patient's collected intake data.  Below are the pertinents:    Allergies   Allergen Reactions    Adhesive Tape Rash         Current Outpatient Medications:     metoprolol tartrate (LOPRESSOR) 50 MG tablet, Take 1 tablet by mouth 2 times daily, Disp: 180 tablet, Rfl: 2    lovastatin (MEVACOR) 10 MG tablet, Take 1 tablet by mouth nightly, Disp: 90 tablet, Rfl: 2    Elastic Bandages & Supports (FUTURO SOFT CERVICAL COLLAR) MISC, Use as directed, Disp: 1 each, Rfl: 0    diclofenac sodium (VOLTAREN) 1 % GEL, Apply 2 g topically 4 times daily as needed for Pain, Disp: 50

## 2024-05-13 ENCOUNTER — OFFICE VISIT (OUTPATIENT)
Age: 84
End: 2024-05-13
Payer: MEDICARE

## 2024-05-13 DIAGNOSIS — M50.30 DEGENERATIVE DISC DISEASE, CERVICAL: ICD-10-CM

## 2024-05-13 DIAGNOSIS — M48.062 SPINAL STENOSIS, LUMBAR REGION WITH NEUROGENIC CLAUDICATION: Primary | ICD-10-CM

## 2024-05-13 DIAGNOSIS — M47.812 ARTHROPATHY OF CERVICAL FACET JOINT: ICD-10-CM

## 2024-05-13 PROCEDURE — G8400 PT W/DXA NO RESULTS DOC: HCPCS | Performed by: NURSE PRACTITIONER

## 2024-05-13 PROCEDURE — 1123F ACP DISCUSS/DSCN MKR DOCD: CPT | Performed by: NURSE PRACTITIONER

## 2024-05-13 PROCEDURE — G8420 CALC BMI NORM PARAMETERS: HCPCS | Performed by: NURSE PRACTITIONER

## 2024-05-13 PROCEDURE — 99213 OFFICE O/P EST LOW 20 MIN: CPT | Performed by: NURSE PRACTITIONER

## 2024-05-13 PROCEDURE — 1090F PRES/ABSN URINE INCON ASSESS: CPT | Performed by: NURSE PRACTITIONER

## 2024-05-13 PROCEDURE — 1036F TOBACCO NON-USER: CPT | Performed by: NURSE PRACTITIONER

## 2024-05-13 PROCEDURE — G8428 CUR MEDS NOT DOCUMENT: HCPCS | Performed by: NURSE PRACTITIONER

## 2024-05-13 NOTE — PROGRESS NOTES
L2, and T12 on L1. Vertebral body  heights appear maintained. Moderate loss of disc height at L5-S1. Otherwise,  mild to moderate loss of disc height with relative sparing at L3-L4 and L4-L5.    L3-L5 fusion with paraspinal rods, pedicle screws, disc prostheses, and  laminectomies. Minimal scattered endplate edema throughout the visualized spine  is likely degenerative. Visualized cord appears normal given artifact. Conus  terminates at L2. Edema/scarring in the paraspinal musculature. No edema in the  posterior subcutaneous soft tissues. Probable cyst in the left kidney. 2.9 cm  infrarenal abdominal aortic aneurysm.    T12-L1: Disc bulge. Spinal canal demonstrates mild stenosis. No foraminal  stenoses.    L1-L2: Disc bulge. Spinal canal demonstrates mild to moderate stenosis. Mild  lateral recess stenoses. Mild foraminal stenoses.    L2-L3: Broad-based central and left lateral disc protrusions. Spinal canal  demonstrates moderate stenosis. Severe left and moderate to severe right lateral  recess stenoses. Moderate to severe left and moderate right foraminal stenoses.    L3-L4: No disc bulge or herniation. Spinal canal appears patent without  stenosis. No lateral recess stenoses. Mild to moderate foraminal stenoses.    L4-L5: No disc bulge or herniation. Spinal canal appears patent without  stenosis. No lateral recess stenoses. Mild foraminal stenoses.    L5-S1: Broad-based central disc protrusion. Spinal canal appears patent without  stenosis. No lateral recess stenoses. Moderate foraminal stenoses with  suboptimal evaluation noted.    Impression  1. Degenerative changes as described, worst at L2-L3.  2. 2.9 cm infrarenal abdominal aortic aneurysm. 5-year follow-up recommended.  3. Other findings as described.    Discussed aneurysm finding with patient and her daughter.  Will defer this to primary care provider.  They are aware.  Also attached to this note.  Just recommended to have monitoring of this there is

## 2024-07-08 ENCOUNTER — TELEPHONE (OUTPATIENT)
Dept: INTERNAL MEDICINE CLINIC | Facility: CLINIC | Age: 84
End: 2024-07-08

## 2024-07-08 NOTE — TELEPHONE ENCOUNTER
Requesting to know about the pt having mental health help, discussed the pt having dementia and discuss meds to assist the pt. Please call and advise.

## 2024-07-09 NOTE — TELEPHONE ENCOUNTER
Returned call to Anna [facility] child, to get more information on request for mental health help. Unable to leave a WM as her VM box is full.

## 2024-07-24 ENCOUNTER — TELEPHONE (OUTPATIENT)
Dept: INTERNAL MEDICINE CLINIC | Facility: CLINIC | Age: 84
End: 2024-07-24

## 2024-07-24 ENCOUNTER — HOSPITAL ENCOUNTER (EMERGENCY)
Age: 84
Discharge: HOME | End: 2024-07-24
Payer: MEDICARE

## 2024-07-24 VITALS
RESPIRATION RATE: 16 BRPM | TEMPERATURE: 98.2 F | HEART RATE: 60 BPM | SYSTOLIC BLOOD PRESSURE: 201 MMHG | DIASTOLIC BLOOD PRESSURE: 81 MMHG | WEIGHT: 120 LBS | BODY MASS INDEX: 20.6 KG/M2 | OXYGEN SATURATION: 98 %

## 2024-07-24 DIAGNOSIS — S81.802A WOUND OF LEFT LOWER EXTREMITY, INITIAL ENCOUNTER: Primary | ICD-10-CM

## 2024-07-24 PROCEDURE — 90471 IMMUNIZATION ADMIN: CPT

## 2024-07-24 PROCEDURE — 6360000002 HC RX W HCPCS

## 2024-07-24 PROCEDURE — 87205 SMEAR GRAM STAIN: CPT

## 2024-07-24 PROCEDURE — 87070 CULTURE OTHR SPECIMN AEROBIC: CPT

## 2024-07-24 PROCEDURE — 87186 SC STD MICRODIL/AGAR DIL: CPT

## 2024-07-24 PROCEDURE — 87077 CULTURE AEROBIC IDENTIFY: CPT

## 2024-07-24 PROCEDURE — 99284 EMERGENCY DEPT VISIT MOD MDM: CPT

## 2024-07-24 PROCEDURE — 90714 TD VACC NO PRESV 7 YRS+ IM: CPT

## 2024-07-24 RX ORDER — CEPHALEXIN 500 MG/1
500 CAPSULE ORAL 2 TIMES DAILY
Qty: 14 CAPSULE | Refills: 0 | Status: SHIPPED | OUTPATIENT
Start: 2024-07-24 | End: 2024-07-31

## 2024-07-24 RX ADMIN — CLOSTRIDIUM TETANI TOXOID ANTIGEN (FORMALDEHYDE INACTIVATED) AND CORYNEBACTERIUM DIPHTHERIAE TOXOID ANTIGEN (FORMALDEHYDE INACTIVATED) 0.5 ML: 5; 2 INJECTION, SUSPENSION INTRAMUSCULAR at 17:15

## 2024-07-24 NOTE — TELEPHONE ENCOUNTER
Returned call to patients daughter Anna, who states patient got scratched last week on back of her leg by a dog. States they have been cleaning, applying Vaseline and applying bandages to the area. Daughter states that she cut off the flap of skin that was to the area to help it heal quicker, but no change to the wound. She states that there is a red tinged drainage on the bandages but no pus, or foul odor noted.     Advised to take patient to nearest UC/ED to have it evaluated. She was also given the number for [facility] to get her mother scheduled for the referral placed 4/7/24, in which they attempted to contact them 3 times with no answer.    Patient's daughter states that her mother is getting more worked up and requesting to see if Dr. Turcios would prescribe her something to help her relax in the meantime. Advised that I will send this message over for her advising and update her with a response.

## 2024-07-24 NOTE — ED PROVIDER NOTES
Emergency Department Provider Note       PCP: Mindy Dey MD   Age: 83 y.o.   Sex: female     DISPOSITION Discharge - Pending Orders Complete 07/24/2024 05:06:25 PM       ICD-10-CM    1. Wound of left lower extremity, initial encounter  S81.802A [facility], Wound Care          Medical Decision Making     Patient presented with concerns of wound on posterior aspect of left leg.  Apparently her dog scratches at her leg as noted in HPI and wound has developed to a purulent ulcerated wound.  She is not a diabetic.  Unsure of her last Tdap, this was updated today.  Nonadherent dressing was applied in ED course.  Begin Keflex.  Given referral to wound care center.  Will also obtain wound culture for further analysis.     1 acute complicated illness or injury.  Prescription drug management performed.  Patient was discharged risks and benefits of hospitalization were considered.  Shared medical decision making was utilized in creating the patients health plan today.    I independently ordered and reviewed each unique test.       I interpreted the labs.              History     83-year-old female presenting with concerns of left leg wound that has been present for about 1 week.  States that her dog will \"paw at her leg\" causing small scratches but seemingly this has been done numerous times over the last week or so and the patient has developed a wound on her left leg, posteriorly.  Was seen by her PCP earlier this afternoon and was advised to report to ED for evaluation.    The history is provided by the patient.     Physical Exam     Vitals signs and nursing note reviewed:  Vitals:    07/24/24 1516   BP: (!) 208/82   Pulse: 66   Resp: 18   Temp: 98.2 °F (36.8 °C)   TempSrc: Oral   SpO2: 97%   Weight: 54.4 kg (120 lb)      Physical Exam  Vitals reviewed.   Constitutional:       General: She is not in acute distress.     Appearance: Normal appearance. She is normal weight. She is not

## 2024-07-24 NOTE — ED TRIAGE NOTES
Pt coming in after her dog scratched her left calf about two weeks ago. Pt state she has been cleaning in daily but wound is not healing. Pt states there is no swelling to leg, or redness, or increased pain. Pt has wound covered in triage. Pt denies diabetes. Pt does have hypertension. Pt also takes 81 mg of aspirin a day. Pt denies any blood thinners. Pt states dogs sleep inside.

## 2024-07-24 NOTE — DISCHARGE INSTRUCTIONS
We are doing a wound culture to see what Bacteria grows from the wound in your leg.  We will change your antibiotics as needed.  Begin taking Keflex antibiotics as prescribed.  As we discussed keep your leg as dry as possible.  You may need to wrap your leg in Saran wrap when bathing.  I also given you a referral to wound care center to ensure that this wound heals appropriately.  Scheduling will contact you in the next day to help set up a follow-up appointment.    Return sooner if you experience any worsening or worrisome symptoms.

## 2024-07-24 NOTE — ED NOTES
Pt c/o dog scratch to the left calf area x 2 weeks.  States that she has been cleaning the area but concerned that it may not be healing

## 2024-07-24 NOTE — TELEPHONE ENCOUNTER
Per pt cullen pt was scratched by several dogs on her leg last week, pt has been treating this herself; the cullen state a doctor friend liked at it and advised for pt to be seen by PC as it look infected. Requesting appt today. Please advise.

## 2024-07-25 NOTE — TELEPHONE ENCOUNTER
Patient will need to be seen in the office for reevaluation.    Please give her an appointment to be seen regularly as soon as possible. appointment for next week

## 2024-07-26 NOTE — TELEPHONE ENCOUNTER
Patient was called  to follow up and per patient she went to ER/UC for the dog scratch and she is taking medications, Cephalexin 500mg and waiting for another office to call her that she was advised of; stated at this time she does not require a visit with Dr. Turcios and will call if this change.     Pt spoke to Angy Devine

## 2024-07-27 LAB
BACTERIA SPEC CULT: ABNORMAL
BACTERIA SPEC CULT: ABNORMAL
GRAM STN SPEC: ABNORMAL
GRAM STN SPEC: ABNORMAL
SERVICE CMNT-IMP: ABNORMAL

## 2024-07-27 NOTE — PROGRESS NOTES
ED pharmacist reviewed recent results of wound culture.    The patient received a prescription for cephalexin upon discharge. Based on culture results, the patient is being adequately treated for the identified infection with existing antimicrobial therapy. No further intervention needed.    Allergies as of 07/24/2024 - Fully Reviewed 07/24/2024   Allergen Reaction Noted    Adhesive tape Rash 08/17/2020     Lucie Sandoval, PharmD.  Emergency Medicine Clinical Pharmacist

## 2024-07-30 ENCOUNTER — HOSPITAL ENCOUNTER (OUTPATIENT)
Dept: WOUND CARE | Age: 84
Discharge: HOME | End: 2024-07-30
Payer: MEDICARE

## 2024-07-30 VITALS
HEART RATE: 53 BPM | WEIGHT: 135.2 LBS | BODY MASS INDEX: 23.08 KG/M2 | HEIGHT: 64 IN | SYSTOLIC BLOOD PRESSURE: 193 MMHG | OXYGEN SATURATION: 98 % | DIASTOLIC BLOOD PRESSURE: 76 MMHG | TEMPERATURE: 98.1 F | RESPIRATION RATE: 18 BRPM

## 2024-07-30 PROCEDURE — 11042 DBRDMT SUBQ TIS 1ST 20SQCM/<: CPT

## 2024-07-30 PROCEDURE — 99213 OFFICE O/P EST LOW 20 MIN: CPT

## 2024-07-30 RX ORDER — CIPROFLOXACIN 500 MG/1
500 TABLET, FILM COATED ORAL 2 TIMES DAILY
Qty: 28 TABLET | Refills: 0 | Status: SHIPPED | OUTPATIENT
Start: 2024-07-30 | End: 2024-08-13

## 2024-07-30 NOTE — FLOWSHEET NOTE
07/30/24 1033   Right Leg Edema Point of Measurement   Leg circumference 36 cm   Ankle circumference 20 cm   Foot circumference 21 cm   Left Leg Edema Point of Measurement   Leg circumference 36 cm   Ankle circumference 19 cm   Foot circumference 20 cm   Compression Therapy Tubular elastic support bandage   RLE Neurovascular Assessment   Capillary Refill Less than/Equal to 3 seconds   Color Appropriate for Ethnicity   Temperature Warm   R Post Tibial Pulse +1 (Weak)   R Pedal Pulse +2   LLE Neurovascular Assessment   Capillary Refill Less than/Equal to 3 seconds   Color Appropriate for Ethnicity   Temperature Warm   L Post Tibial Pulse +1 (Weak)   L Pedal Pulse +2   Wound 07/30/24 Tibial Left;Posterior # 1   Date First Assessed/Time First Assessed: 07/30/24 1052   Present on Original Admission: Yes  Wound Approximate Age at First Assessment (Weeks): 2 weeks  Primary Wound Type: Traumatic  Location: Tibial  Wound Location Orientation: Left;Posterior  Wound...   Wound Image     Wound Etiology Traumatic   Dressing Status Intact   Wound Cleansed Cleansed with saline   Dressing/Treatment Alginate with Ag;ABD;Roll gauze;Tape/Soft cloth adhesive tape   Wound Length (cm) 2.2 cm   Wound Width (cm) 2.5 cm   Wound Depth (cm) 0.6 cm   Wound Surface Area (cm^2) 5.5 cm^2   Wound Volume (cm^3) 3.3 cm^3   Post-Procedure Length (cm) 2 cm   Post-Procedure Width (cm) 2.3 cm   Post-Procedure Depth (cm) 0.8 cm   Post-Procedure Surface Area (cm^2) 4.6 cm^2   Post-Procedure Volume (cm^3) 3.68 cm^3   Wound Assessment [place]/red;Slough   Drainage Amount Small (< 25%)   Drainage Description Serosanguinous   Odor None   Lana-wound Assessment Intact   Margins Defined edges   Wound Thickness Description not for Pressure Injury Full thickness   Pain Assessment   Pain Assessment None - Denies Pain

## 2024-07-30 NOTE — DISCHARGE INSTRUCTIONS
Instructions: left posterior leg  Cleanse with normal saline.  Vashe Wound Solution soak placed on wound bed for a minimum of 60 seconds prior to dressing application.   Apply Sliver Alginate to wound bed.  Cover with ABD.  Secure with roll gauze and tape.  Change 3 times weekly.  Tubigrip to left leg.     [facility] to admit patient for 3 times a week dressing changes.     Prescription for Cipro sent to your pharmacy. Please  and start taking. Stop taking keflex.    Please obtain cast cover for showering. Keep dressing dry.

## 2024-07-30 NOTE — WOUND CARE
Discharge Instructions for  [facility]  [address] [place] [address]  [address] [place] [address]  Phone [phone]   Fax [phone]      NAME:  Chloe Ta  YOB: 1940  MEDICAL RECORD NUMBER:  [id number]  DATE:  7/30/2024    Return Appointment:   1 week with Rey Vidla DO      Instructions: left posterior leg  Cleanse with normal saline.  Vashe Wound Solution soak placed on wound bed for a minimum of 60 seconds prior to dressing application.   Apply Sliver Alginate to wound bed.  Cover with ABD.  Secure with roll gauze and tape.  Change 3 times weekly.  Tubigrip to left leg.    Home Health to admit patient for 3 times a week dressing changes.    Prescription for Cipro sent to your pharmacy. Please  and start taking. Stop taking keflex.    Please obtain cast cover for showering. Keep dressing dry.     Should you experience increased redness, swelling, pain, foul odor, size of wound(s), or have a temperature over 101 degrees please contact the Wound Healing Center at [phone] or if after hours contact your primary care physician or go to the hospital emergency department.    PLEASE NOTE: IF YOU ARE UNABLE TO OBTAIN WOUND SUPPLIES, CONTINUE TO USE THE SUPPLIES YOU HAVE AVAILABLE UNTIL YOU ARE ABLE TO REACH US. IT IS MOST IMPORTANT TO KEEP THE WOUND COVERED AT ALL TIMES.    Electronically signed Graeme Leon RN on 7/30/2024 at 11:20 AM

## 2024-07-31 ENCOUNTER — TELEPHONE (OUTPATIENT)
Dept: INTERNAL MEDICINE CLINIC | Facility: CLINIC | Age: 84
End: 2024-07-31

## 2024-07-31 DIAGNOSIS — R68.89 OTHER GENERAL SYMPTOMS AND SIGNS: ICD-10-CM

## 2024-07-31 DIAGNOSIS — R41.3 MEMORY CHANGE: Primary | ICD-10-CM

## 2024-07-31 NOTE — TELEPHONE ENCOUNTER
Gricel with neurology reviewed the referral. The patient needs to have MRI brain, TSH, B12, folate, A1c, CBC, RPR, and HIV testing to meet their requirements. Since they are scheduling out so far, she will reach out to the family to get appointment scheduled. Please have provider order the required testing so it is done before her neurology appointment.

## 2024-07-31 NOTE — TELEPHONE ENCOUNTER
Patient's daughter has called in stating that a referral was sent to Neurology, however she kept missing the phone. When she tried to call to schedule she was informed that another referral would need to be faxed, because the current was closed, due to not being able to be contacted.

## 2024-07-31 NOTE — TELEPHONE ENCOUNTER
Please advise.     Gricel with neurology reviewed the referral. The patient needs to have MRI brain, TSH, B12, folate, A1c, CBC, RPR, and HIV testing to meet their requirements.

## 2024-08-05 NOTE — TELEPHONE ENCOUNTER
Patient also with questions about EOV needed with Dr. Turcios. Patient last note states to return in about 4 months for chronic visit f/u, EOV.    Routed to  staff to get scheduled.

## 2024-08-05 NOTE — TELEPHONE ENCOUNTER
Tests have been ordered.  Please inform patient and or her daughter about her requirements before Ms. Ta can see neurologist.    Patient encouraged to get testing done, since these orders were requested by the neurology office prior to her getting an appointment.

## 2024-08-07 ENCOUNTER — HOSPITAL ENCOUNTER (OUTPATIENT)
Dept: WOUND CARE | Age: 84
Discharge: HOME | End: 2024-08-07
Payer: MEDICARE

## 2024-08-07 VITALS
HEART RATE: 62 BPM | SYSTOLIC BLOOD PRESSURE: 161 MMHG | HEIGHT: 64 IN | BODY MASS INDEX: 22.77 KG/M2 | OXYGEN SATURATION: 98 % | TEMPERATURE: 98.2 F | WEIGHT: 133.4 LBS | DIASTOLIC BLOOD PRESSURE: 69 MMHG | RESPIRATION RATE: 18 BRPM

## 2024-08-07 PROCEDURE — 11042 DBRDMT SUBQ TIS 1ST 20SQCM/<: CPT

## 2024-08-07 RX ORDER — LIDOCAINE HYDROCHLORIDE 20 MG/ML
JELLY TOPICAL PRN
Status: DISCONTINUED | OUTPATIENT
Start: 2024-08-07 | End: 2024-08-08 | Stop reason: HOSPADM

## 2024-08-07 RX ADMIN — LIDOCAINE HYDROCHLORIDE: 20 JELLY TOPICAL at 10:42

## 2024-08-07 NOTE — DISCHARGE INSTRUCTIONS
Left Posterior Leg:  Cleanse with normal saline.  Vashe Wound Solution soak placed on wound bed for a minimum of 60 seconds prior to dressing application.   Apply Sliver Alginate to wound bed.  Cover with ABD.  Secure with roll gauze and tape.  Change 3 times weekly.  Tubigrip to left leg.     Home Health for 3 times a week dressing changes.         Please obtain cast cover for showering. Keep dressing dry.

## 2024-08-07 NOTE — FLOWSHEET NOTE
08/07/24 1010   Wound 07/30/24 Tibial Left;Posterior # 1   Date First Assessed/Time First Assessed: 07/30/24 1052   Present on Original Admission: Yes  Wound Approximate Age at First Assessment (Weeks): 2 weeks  Primary Wound Type: Traumatic  Location: Tibial  Wound Location Orientation: Left;Posterior  Wound...   Wound Image     Wound Etiology Traumatic   Dressing Status Old drainage noted   Wound Cleansed Cleansed with saline   Dressing/Treatment Alginate with Ag;ABD;Gauze dressing/dressing sponge   Wound Length (cm) 1.9 cm   Wound Width (cm) 2 cm   Wound Depth (cm) 0.4 cm   Wound Surface Area (cm^2) 3.8 cm^2   Change in Wound Size % (l*w) 30.91   Wound Volume (cm^3) 1.52 cm^3   Wound Healing % 54   Post-Procedure Length (cm) 2 cm   Post-Procedure Width (cm) 2 cm   Post-Procedure Depth (cm) 0.4 cm   Post-Procedure Surface Area (cm^2) 4 cm^2   Post-Procedure Volume (cm^3) 1.6 cm^3   Wound Assessment [place]/red;Slough   Drainage Amount Small (< 25%)   Drainage Description Serosanguinous   Odor Mild   Lana-wound Assessment Intact   Wound Thickness Description not for Pressure Injury Full thickness

## 2024-08-07 NOTE — WOUND CARE
Discharge Instructions for  [facility]  [address] [place] [address]  [address] [place] [id number]  Phone [phone]   Fax [phone]      NAME:  Chloe Ta  YOB: 1940  MEDICAL RECORD NUMBER:  [id number]  DATE:  8/7/2024    Return Appointment:   2 weeks with Rey Vidal DO      Instructions:   Left Posterior Leg:  Cleanse with normal saline.  Vashe Wound Solution soak placed on wound bed for a minimum of 60 seconds prior to dressing application.   Apply Sliver Alginate to wound bed.  Cover with ABD.  Secure with roll gauze and tape.  Change 3 times weekly.  Tubigrip to left leg.     Home Health for 3 times a week dressing changes.         Please obtain cast cover for showering. Keep dressing dry.            Should you experience increased redness, swelling, pain, foul odor, size of wound(s), or have a temperature over 101 degrees please contact the [facility] Healing Center at [phone] or if after hours contact your primary care physician or go to the hospital emergency department.    PLEASE NOTE: IF YOU ARE UNABLE TO OBTAIN WOUND SUPPLIES, CONTINUE TO USE THE SUPPLIES YOU HAVE AVAILABLE UNTIL YOU ARE ABLE TO REACH US. IT IS MOST IMPORTANT TO KEEP THE WOUND COVERED AT ALL TIMES.    Electronically signed Rashida PLASCENCIA RN on 8/7/2024 at 10:39 AM

## 2024-08-12 ENCOUNTER — HOSPITAL ENCOUNTER (OUTPATIENT)
Dept: MRI IMAGING | Age: 84
Discharge: HOME | End: 2024-08-15
Attending: INTERNAL MEDICINE
Payer: MEDICARE

## 2024-08-12 DIAGNOSIS — R68.89 OTHER GENERAL SYMPTOMS AND SIGNS: ICD-10-CM

## 2024-08-12 DIAGNOSIS — R41.3 MEMORY CHANGE: ICD-10-CM

## 2024-08-12 PROBLEM — L03.116 CELLULITIS OF LEFT LEG: Status: ACTIVE | Noted: 2024-08-12

## 2024-08-12 PROBLEM — L03.116 CELLULITIS OF LEFT LEG: Chronic | Status: ACTIVE | Noted: 2024-08-12

## 2024-08-12 PROBLEM — L97.922 NON-PRESSURE CHRONIC ULCER OF LEFT LOWER LEG, WITH FAT LAYER EXPOSED (HCC): Status: ACTIVE | Noted: 2024-08-12

## 2024-08-12 PROBLEM — L97.922 NON-PRESSURE CHRONIC ULCER OF LEFT LOWER LEG, WITH FAT LAYER EXPOSED (HCC): Chronic | Status: ACTIVE | Noted: 2024-08-12

## 2024-08-12 PROCEDURE — A9579 GAD-BASE MR CONTRAST NOS,1ML: HCPCS | Performed by: INTERNAL MEDICINE

## 2024-08-12 PROCEDURE — 70553 MRI BRAIN STEM W/O & W/DYE: CPT

## 2024-08-12 PROCEDURE — 6360000004 HC RX CONTRAST MEDICATION: Performed by: INTERNAL MEDICINE

## 2024-08-12 RX ADMIN — GADOTERIDOL 12 ML: 279.3 INJECTION, SOLUTION INTRAVENOUS at 20:33

## 2024-08-21 ENCOUNTER — HOSPITAL ENCOUNTER (OUTPATIENT)
Dept: WOUND CARE | Age: 84
Discharge: HOME | End: 2024-08-21
Payer: MEDICARE

## 2024-08-21 VITALS
SYSTOLIC BLOOD PRESSURE: 178 MMHG | HEIGHT: 64 IN | TEMPERATURE: 98.1 F | WEIGHT: 133 LBS | DIASTOLIC BLOOD PRESSURE: 70 MMHG | BODY MASS INDEX: 22.71 KG/M2

## 2024-08-21 DIAGNOSIS — L97.922 NON-PRESSURE CHRONIC ULCER OF LEFT LOWER LEG, WITH FAT LAYER EXPOSED (HCC): ICD-10-CM

## 2024-08-21 DIAGNOSIS — L03.116 CELLULITIS OF LEFT LEG: Primary | ICD-10-CM

## 2024-08-21 DIAGNOSIS — R68.89 OTHER GENERAL SYMPTOMS AND SIGNS: ICD-10-CM

## 2024-08-21 DIAGNOSIS — R41.3 MEMORY CHANGE: ICD-10-CM

## 2024-08-21 LAB
BASOPHILS # BLD: 0 K/UL (ref 0–0.2)
BASOPHILS NFR BLD: 1 % (ref 0–2)
DIFFERENTIAL METHOD BLD: ABNORMAL
EOSINOPHIL # BLD: 0.2 K/UL (ref 0–0.8)
EOSINOPHIL NFR BLD: 5 % (ref 0.5–7.8)
ERYTHROCYTE [DISTWIDTH] IN BLOOD BY AUTOMATED COUNT: 13.6 % (ref 11.9–14.6)
FOLATE SERPL-MCNC: 8.6 NG/ML (ref 3.1–17.5)
HCT VFR BLD AUTO: 46.8 % (ref 35.8–46.3)
HGB BLD-MCNC: 14.8 G/DL (ref 11.7–15.4)
HIV 1+2 AB+HIV1 P24 AG SERPL QL IA: NONREACTIVE
HIV 1/2 RESULT COMMENT: NORMAL
IMM GRANULOCYTES # BLD AUTO: 0 K/UL (ref 0–0.5)
IMM GRANULOCYTES NFR BLD AUTO: 0 % (ref 0–5)
LYMPHOCYTES # BLD: 0.7 K/UL (ref 0.5–4.6)
LYMPHOCYTES NFR BLD: 14 % (ref 13–44)
MCH RBC QN AUTO: 29.6 PG (ref 26.1–32.9)
MCHC RBC AUTO-ENTMCNC: 31.6 G/DL (ref 31.4–35)
MCV RBC AUTO: 93.6 FL (ref 82–102)
MONOCYTES # BLD: 0.4 K/UL (ref 0.1–1.3)
MONOCYTES NFR BLD: 9 % (ref 4–12)
NEUTS SEG # BLD: 3.5 K/UL (ref 1.7–8.2)
NEUTS SEG NFR BLD: 71 % (ref 43–78)
NRBC # BLD: 0 K/UL (ref 0–0.2)
PLATELET # BLD AUTO: 164 K/UL (ref 150–450)
PMV BLD AUTO: 11.1 FL (ref 9.4–12.3)
RBC # BLD AUTO: 5 M/UL (ref 4.05–5.2)
T PALLIDUM AB SER QL IA: NONREACTIVE
TSH, 3RD GENERATION: 3.1 UIU/ML (ref 0.27–4.2)
VIT B12 SERPL-MCNC: 2571 PG/ML (ref 193–986)
WBC # BLD AUTO: 4.9 K/UL (ref 4.3–11.1)

## 2024-08-21 PROCEDURE — 6370000000 HC RX 637 (ALT 250 FOR IP): Performed by: FAMILY MEDICINE

## 2024-08-21 PROCEDURE — 11042 DBRDMT SUBQ TIS 1ST 20SQCM/<: CPT

## 2024-08-21 RX ORDER — GENTAMICIN SULFATE 1 MG/G
OINTMENT TOPICAL ONCE
OUTPATIENT
Start: 2024-08-21 | End: 2024-08-21

## 2024-08-21 RX ORDER — LIDOCAINE 50 MG/G
OINTMENT TOPICAL ONCE
Status: COMPLETED | OUTPATIENT
Start: 2024-08-21 | End: 2024-08-21

## 2024-08-21 RX ORDER — BETAMETHASONE DIPROPIONATE 0.5 MG/G
CREAM TOPICAL ONCE
OUTPATIENT
Start: 2024-08-21 | End: 2024-08-21

## 2024-08-21 RX ORDER — CLOBETASOL PROPIONATE 0.5 MG/G
OINTMENT TOPICAL ONCE
OUTPATIENT
Start: 2024-08-21 | End: 2024-08-21

## 2024-08-21 RX ORDER — GINSENG 100 MG
CAPSULE ORAL ONCE
OUTPATIENT
Start: 2024-08-21 | End: 2024-08-21

## 2024-08-21 RX ORDER — SODIUM CHLOR/HYPOCHLOROUS ACID 0.033 %
SOLUTION, IRRIGATION IRRIGATION ONCE
Status: COMPLETED | OUTPATIENT
Start: 2024-08-21 | End: 2024-08-21

## 2024-08-21 RX ORDER — LIDOCAINE HYDROCHLORIDE 20 MG/ML
JELLY TOPICAL ONCE
OUTPATIENT
Start: 2024-08-21 | End: 2024-08-21

## 2024-08-21 RX ORDER — LIDOCAINE 50 MG/G
OINTMENT TOPICAL ONCE
OUTPATIENT
Start: 2024-08-21 | End: 2024-08-21

## 2024-08-21 RX ORDER — IBUPROFEN 200 MG
TABLET ORAL ONCE
OUTPATIENT
Start: 2024-08-21 | End: 2024-08-21

## 2024-08-21 RX ORDER — BACITRACIN ZINC AND POLYMYXIN B SULFATE 500; 1000 [USP'U]/G; [USP'U]/G
OINTMENT TOPICAL ONCE
OUTPATIENT
Start: 2024-08-21 | End: 2024-08-21

## 2024-08-21 RX ORDER — LIDOCAINE 40 MG/G
CREAM TOPICAL ONCE
OUTPATIENT
Start: 2024-08-21 | End: 2024-08-21

## 2024-08-21 RX ORDER — LIDOCAINE HYDROCHLORIDE 40 MG/ML
SOLUTION TOPICAL ONCE
OUTPATIENT
Start: 2024-08-21 | End: 2024-08-21

## 2024-08-21 RX ORDER — TRIAMCINOLONE ACETONIDE 1 MG/G
OINTMENT TOPICAL ONCE
OUTPATIENT
Start: 2024-08-21 | End: 2024-08-21

## 2024-08-21 RX ORDER — SODIUM CHLOR/HYPOCHLOROUS ACID 0.033 %
SOLUTION, IRRIGATION IRRIGATION ONCE
OUTPATIENT
Start: 2024-08-21 | End: 2024-08-21

## 2024-08-21 RX ADMIN — LIDOCAINE: 50 OINTMENT TOPICAL at 09:38

## 2024-08-21 RX ADMIN — Medication: at 09:38

## 2024-08-21 NOTE — WOUND CARE
Discharge Instructions for  [facility]  [address] [place] [address]  Phone [phone]   Fax [phone]      NAME:  Chloe Ta  YOB: 1940  MEDICAL RECORD NUMBER:  [id number]  DATE:  8/21/2024    Return Appointment:   2 weeks with Rey Vidal DO      Instructions:   Left Posterior Leg:  Cleanse with normal saline.  Vashe Wound Solution soak placed on wound bed for a minimum of 60 seconds prior to dressing application.   Xeroform- apply to wound bed.  Cover with ABD.  Secure with roll gauze and tape or silicone border foam.   Change 3 times weekly.  Tubigrip to left leg.     Home Health for 3 times a week dressing changes.           You may supplement with Vital Proteins in your hot chocolate daily.           Should you experience increased redness, swelling, pain, foul odor, size of wound(s), or have a temperature over 101 degrees please contact the Wound Healing Center at [phone] or if after hours contact your primary care physician or go to the hospital emergency department.    PLEASE NOTE: IF YOU ARE UNABLE TO OBTAIN WOUND SUPPLIES, CONTINUE TO USE THE SUPPLIES YOU HAVE AVAILABLE UNTIL YOU ARE ABLE TO REACH US. IT IS MOST IMPORTANT TO KEEP THE WOUND COVERED AT ALL TIMES.    Electronically signed Ling Jung RN on 8/21/2024 at 9:40 AM

## 2024-08-21 NOTE — DISCHARGE INSTRUCTIONS
NAME:  Chloe Ta  YOB: 1940  MEDICAL RECORD NUMBER:  [id number]  DATE:  8/21/2024     Return Appointment:   2 weeks with Rey Vidal DO        Instructions:   Left Posterior Leg:  Cleanse with normal saline.  Vashe Wound Solution soak placed on wound bed for a minimum of 60 seconds prior to dressing application.   Xeroform- apply to wound bed.  Cover with ABD.  Secure with roll gauze and tape or silicone border foam.   Change 3 times weekly.  Tubigrip to left leg.     Home Health for 3 times a week dressing changes.            You may supplement with Vital Proteins in your hot chocolate daily.

## 2024-08-21 NOTE — FLOWSHEET NOTE
08/21/24 0919   Left Leg Edema Point of Measurement   Leg circumference 35 cm   Ankle circumference 20 cm   Foot circumference 19.5 cm   Compression Therapy Tubular elastic support bandage   Wound 07/30/24 Tibial Left;Posterior # 1   Date First Assessed/Time First Assessed: 07/30/24 1052   Present on Original Admission: Yes  Wound Approximate Age at First Assessment (Weeks): 2 weeks  Primary Wound Type: Traumatic  Location: Tibial  Wound Location Orientation: Left;Posterior  Wound...   Wound Image     Wound Etiology Traumatic   Dressing Status Old drainage noted   Wound Cleansed Vashe   Dressing/Treatment Alginate with Ag   Wound Length (cm) 1.3 cm   Wound Width (cm) 1.2 cm   Wound Depth (cm) 0.1 cm   Wound Surface Area (cm^2) 1.56 cm^2   Change in Wound Size % (l*w) 71.64   Wound Volume (cm^3) 0.156 cm^3   Wound Healing % 95   Post-Procedure Length (cm) 1.3 cm   Post-Procedure Width (cm) 1.2 cm   Post-Procedure Depth (cm) 0.2 cm   Post-Procedure Surface Area (cm^2) 1.56 cm^2   Post-Procedure Volume (cm^3) 0.312 cm^3   Wound Assessment [place]/red;Slough   Drainage Amount Small (< 25%)   Drainage Description Serosanguinous   Odor None   Wound Thickness Description not for Pressure Injury Full thickness     Patient is on asa daily    Patient presents with elevated BP at today's visit. Patient states she has taken her BP meds and is asymptomatic (not dizzy, no headache, no vision changes) Requested that patient call her PCP for an appointment. Patient verified understanding.

## 2024-09-04 ENCOUNTER — HOSPITAL ENCOUNTER (OUTPATIENT)
Dept: WOUND CARE | Age: 84
Discharge: HOME | End: 2024-09-04
Payer: MEDICARE

## 2024-09-04 VITALS
WEIGHT: 145 LBS | DIASTOLIC BLOOD PRESSURE: 59 MMHG | OXYGEN SATURATION: 98 % | SYSTOLIC BLOOD PRESSURE: 198 MMHG | RESPIRATION RATE: 18 BRPM | TEMPERATURE: 97.9 F | BODY MASS INDEX: 24.89 KG/M2 | HEART RATE: 68 BPM

## 2024-09-04 DIAGNOSIS — L03.116 CELLULITIS OF LEFT LEG: Primary | ICD-10-CM

## 2024-09-04 DIAGNOSIS — L97.922 NON-PRESSURE CHRONIC ULCER OF LEFT LOWER LEG, WITH FAT LAYER EXPOSED (HCC): ICD-10-CM

## 2024-09-04 PROCEDURE — 6370000000 HC RX 637 (ALT 250 FOR IP): Performed by: FAMILY MEDICINE

## 2024-09-04 PROCEDURE — 99213 OFFICE O/P EST LOW 20 MIN: CPT

## 2024-09-04 RX ORDER — NEOMYCIN/BACITRACIN/POLYMYXINB 3.5-400-5K
OINTMENT (GRAM) TOPICAL ONCE
OUTPATIENT
Start: 2024-09-04 | End: 2024-09-04

## 2024-09-04 RX ORDER — MUPIROCIN 20 MG/G
OINTMENT TOPICAL ONCE
OUTPATIENT
Start: 2024-09-04 | End: 2024-09-04

## 2024-09-04 RX ORDER — GENTAMICIN SULFATE 1 MG/G
OINTMENT TOPICAL ONCE
OUTPATIENT
Start: 2024-09-04 | End: 2024-09-04

## 2024-09-04 RX ORDER — GINSENG 100 MG
CAPSULE ORAL ONCE
OUTPATIENT
Start: 2024-09-04 | End: 2024-09-04

## 2024-09-04 RX ORDER — BETAMETHASONE DIPROPIONATE 0.5 MG/G
CREAM TOPICAL ONCE
OUTPATIENT
Start: 2024-09-04 | End: 2024-09-04

## 2024-09-04 RX ORDER — LIDOCAINE HYDROCHLORIDE 20 MG/ML
JELLY TOPICAL ONCE
OUTPATIENT
Start: 2024-09-04 | End: 2024-09-04

## 2024-09-04 RX ORDER — LIDOCAINE HYDROCHLORIDE 40 MG/ML
SOLUTION TOPICAL ONCE
OUTPATIENT
Start: 2024-09-04 | End: 2024-09-04

## 2024-09-04 RX ORDER — LIDOCAINE 40 MG/G
CREAM TOPICAL ONCE
OUTPATIENT
Start: 2024-09-04 | End: 2024-09-04

## 2024-09-04 RX ORDER — LIDOCAINE 50 MG/G
OINTMENT TOPICAL ONCE
Status: COMPLETED | OUTPATIENT
Start: 2024-09-04 | End: 2024-09-04

## 2024-09-04 RX ORDER — CLOBETASOL PROPIONATE 0.5 MG/G
OINTMENT TOPICAL ONCE
OUTPATIENT
Start: 2024-09-04 | End: 2024-09-04

## 2024-09-04 RX ORDER — SODIUM CHLOR/HYPOCHLOROUS ACID 0.033 %
SOLUTION, IRRIGATION IRRIGATION ONCE
OUTPATIENT
Start: 2024-09-04 | End: 2024-09-04

## 2024-09-04 RX ORDER — BACITRACIN ZINC AND POLYMYXIN B SULFATE 500; 1000 [USP'U]/G; [USP'U]/G
OINTMENT TOPICAL ONCE
OUTPATIENT
Start: 2024-09-04 | End: 2024-09-04

## 2024-09-04 RX ORDER — LIDOCAINE 50 MG/G
OINTMENT TOPICAL ONCE
OUTPATIENT
Start: 2024-09-04 | End: 2024-09-04

## 2024-09-04 RX ORDER — TRIAMCINOLONE ACETONIDE 1 MG/G
OINTMENT TOPICAL ONCE
OUTPATIENT
Start: 2024-09-04 | End: 2024-09-04

## 2024-09-04 RX ORDER — SILVER SULFADIAZINE 10 MG/G
CREAM TOPICAL ONCE
OUTPATIENT
Start: 2024-09-04 | End: 2024-09-04

## 2024-09-04 RX ADMIN — LIDOCAINE: 50 OINTMENT TOPICAL at 10:18

## 2024-09-04 NOTE — WOUND CARE
Discharge Instructions for  [facility]  [address] [place] [address]  [address] [place] [address]  Phone [phone]   Fax [phone]      NAME:  Chloe Ta  YOB: 1940  MEDICAL RECORD NUMBER:  [id number]  DATE:  9/4/2024    Return Appointment:   Discharge with Rey Vidal DO      Instructions:   Left Posterior Leg:  Wound has healed.  Apply vaseline twice daily for one month.  Continue to wear tubigrip for one month.  You may supplement with Vital Proteins in your hot chocolate daily until you have finished your supply.     Home Health discharge due to wound be healed.    Should you experience increased redness, swelling, pain, foul odor, size of wound(s), or have a temperature over 101 degrees please contact the Wound Healing Center at [phone] or if after hours contact your primary care physician or go to the hospital emergency department.    PLEASE NOTE: IF YOU ARE UNABLE TO OBTAIN WOUND SUPPLIES, CONTINUE TO USE THE SUPPLIES YOU HAVE AVAILABLE UNTIL YOU ARE ABLE TO REACH US. IT IS MOST IMPORTANT TO KEEP THE WOUND COVERED AT ALL TIMES.    Electronically signed Graeme Leon RN on 9/4/2024 at 10:32 AM

## 2024-09-04 NOTE — FLOWSHEET NOTE
09/04/24 1005   Left Leg Edema Point of Measurement   Leg circumference 33 cm   Ankle circumference 19.5 cm   Foot circumference 20.5 cm   Compression Therapy Tubular elastic support bandage   Wound 07/30/24 Tibial Left;Posterior # 1   Date First Assessed/Time First Assessed: 07/30/24 1052   Present on Original Admission: Yes  Wound Approximate Age at First Assessment (Weeks): 2 weeks  Primary Wound Type: Traumatic  Location: Tibial  Wound Location Orientation: Left;Posterior  Wound...   Wound Image    Wound Etiology Traumatic   Dressing Status Intact   Wound Cleansed Cleansed with saline   Dressing/Treatment Other (comment)  (vaseline)   Wound Length (cm) 0 cm   Wound Width (cm) 0 cm   Wound Depth (cm) 0 cm   Wound Surface Area (cm^2) 0 cm^2   Change in Wound Size % (l*w) 100   Wound Volume (cm^3) 0 cm^3   Wound Healing % 100   Wound Assessment Pink/red   Drainage Amount None (dry)   Odor None   Lana-wound Assessment Intact   Margins Attached edges   Wound Thickness Description not for Pressure Injury Full thickness   Pain Assessment   Pain Assessment None - Denies Pain     Patient's BP was 219/94. No shob, dizziness, visual changes, no chest pain. Rechecked after 10 minutes and 198/59. Instructed patient to call her PCP and discuss BP reading and medications. Also instructed patient to take her BP cuff with her to her PCP appointment and have them compared to make sure she is getting accurate readings. Patient verbalized understanding.

## 2024-09-04 NOTE — DISCHARGE INSTRUCTIONS
Instructions:   Left Posterior Leg:  Wound has healed.  Apply vaseline twice daily for one month.  Continue to wear tubigrip for one month.  You may supplement with Vital Proteins in your hot chocolate daily until you have finished your supply.     Home Health discharge due to wound be healed.

## 2024-10-21 ENCOUNTER — OFFICE VISIT (OUTPATIENT)
Dept: INTERNAL MEDICINE CLINIC | Facility: CLINIC | Age: 84
End: 2024-10-21

## 2024-10-21 VITALS
SYSTOLIC BLOOD PRESSURE: 178 MMHG | RESPIRATION RATE: 16 BRPM | DIASTOLIC BLOOD PRESSURE: 80 MMHG | WEIGHT: 133.4 LBS | HEIGHT: 64 IN | BODY MASS INDEX: 22.77 KG/M2 | HEART RATE: 66 BPM | OXYGEN SATURATION: 99 %

## 2024-10-21 DIAGNOSIS — E78.5 HYPERLIPIDEMIA, UNSPECIFIED HYPERLIPIDEMIA TYPE: ICD-10-CM

## 2024-10-21 DIAGNOSIS — C44.42 SQUAMOUS CELL CARCINOMA OF SCALP: ICD-10-CM

## 2024-10-21 DIAGNOSIS — R41.3 MEMORY CHANGE: ICD-10-CM

## 2024-10-21 DIAGNOSIS — H54.40 BLINDNESS OF RIGHT EYE, UNSPECIFIED LEFT EYE VISUAL IMPAIRMENT CATEGORY: ICD-10-CM

## 2024-10-21 DIAGNOSIS — M81.0 AGE-RELATED OSTEOPOROSIS WITHOUT CURRENT PATHOLOGICAL FRACTURE: ICD-10-CM

## 2024-10-21 DIAGNOSIS — I10 ESSENTIAL (PRIMARY) HYPERTENSION: Primary | ICD-10-CM

## 2024-10-21 DIAGNOSIS — M48.062 SPINAL STENOSIS, LUMBAR REGION, WITH NEUROGENIC CLAUDICATION: ICD-10-CM

## 2024-10-21 RX ORDER — LOVASTATIN 10 MG/1
10 TABLET ORAL NIGHTLY
Qty: 90 TABLET | Refills: 3 | Status: SHIPPED | OUTPATIENT
Start: 2024-10-21

## 2024-10-21 RX ORDER — LISINOPRIL 5 MG/1
5 TABLET ORAL DAILY
Qty: 90 TABLET | Refills: 1 | Status: SHIPPED | OUTPATIENT
Start: 2024-10-21

## 2024-10-21 RX ORDER — METOPROLOL TARTRATE 50 MG
50 TABLET ORAL 2 TIMES DAILY
Qty: 180 TABLET | Refills: 3 | Status: SHIPPED | OUTPATIENT
Start: 2024-10-21

## 2024-10-21 NOTE — PROGRESS NOTES
10/21/2024    Chief Complaint   Patient presents with    Follow-up     4 month        Assessment and plan     1. Essential (primary) hypertension  -     CBC with Auto Differential; Future  -     Comprehensive Metabolic Panel; Future  -     lisinopril (PRINIVIL;ZESTRIL) 5 MG tablet; Take 1 tablet by mouth daily, Disp-90 tablet, R-1Normal  2. Hyperlipidemia, unspecified hyperlipidemia type  -     Lipid Panel; Future  3. Blindness of right eye, unspecified left eye visual impairment category  4. Age-related osteoporosis without current pathological fracture  -     Vitamin D 25 Hydroxy; Future  5. Spinal stenosis, lumbar region, with neurogenic claudication  6. Squamous cell carcinoma of scalp     Patient is stable in the office today.  Blood pressure medication lisinopril is refilled.  Her daughter will help check her blood pressure at home and call the office with blood pressure readings.  Patient is not interested in having remote patient blood pressure monitoring.  For vision she will contact previous eye doctors for an appointment.  We discussed her musculoskeletal issues and she will call orthopedics for a follow-up appointment.  Memory is unchanged according to daughter.          Follow up   Return for Labs today . follow up in 3 months EOV.         Subjective           HPI :  Ms. Ta is accompanied by her daughter today.    Patient is a 83-year-old female here for follow-up visit.  Chronic medical problems include essential hypertension, hyperlipidemia, blindness of the right eye.  She is also status post laminectomy for spinal stenosis of lumbosacral spine.  She has a history of squamous cell carcinoma of the skin ( scalp)she also has a history of age-related osteoporosis for  which she is not receiving any treatment [declined therapy].    Hypertension/hyperlipidemia  Compliant with medications.  Asymptomatic for uncontrolled hypertension or coronary artery disease.    Back pain  History of stenosis of

## 2024-10-22 LAB
25(OH)D3 SERPL-MCNC: 27.4 NG/ML (ref 30–100)
ALBUMIN SERPL-MCNC: 3.9 G/DL (ref 3.2–4.6)
ALBUMIN/GLOB SERPL: 1.5 (ref 1–1.9)
ALP SERPL-CCNC: 128 U/L (ref 35–104)
ALT SERPL-CCNC: 19 U/L (ref 8–45)
ANION GAP SERPL CALC-SCNC: 10 MMOL/L (ref 9–18)
AST SERPL-CCNC: 22 U/L (ref 15–37)
BASOPHILS # BLD: 0 K/UL (ref 0–0.2)
BASOPHILS NFR BLD: 1 % (ref 0–2)
BILIRUB SERPL-MCNC: 0.8 MG/DL (ref 0–1.2)
BUN SERPL-MCNC: 16 MG/DL (ref 8–23)
CALCIUM SERPL-MCNC: 9.3 MG/DL (ref 8.8–10.2)
CHLORIDE SERPL-SCNC: 108 MMOL/L (ref 98–107)
CHOLEST SERPL-MCNC: 174 MG/DL (ref 0–200)
CO2 SERPL-SCNC: 29 MMOL/L (ref 20–28)
CREAT SERPL-MCNC: 0.76 MG/DL (ref 0.6–1.1)
DIFFERENTIAL METHOD BLD: NORMAL
EOSINOPHIL # BLD: 0.2 K/UL (ref 0–0.8)
EOSINOPHIL NFR BLD: 4 % (ref 0.5–7.8)
ERYTHROCYTE [DISTWIDTH] IN BLOOD BY AUTOMATED COUNT: 13.4 % (ref 11.9–14.6)
GLOBULIN SER CALC-MCNC: 2.6 G/DL (ref 2.3–3.5)
GLUCOSE SERPL-MCNC: 80 MG/DL (ref 70–99)
HCT VFR BLD AUTO: 46.3 % (ref 35.8–46.3)
HDLC SERPL-MCNC: 59 MG/DL (ref 40–60)
HDLC SERPL: 3 (ref 0–5)
HGB BLD-MCNC: 14.6 G/DL (ref 11.7–15.4)
IMM GRANULOCYTES # BLD AUTO: 0 K/UL (ref 0–0.5)
IMM GRANULOCYTES NFR BLD AUTO: 0 % (ref 0–5)
LDLC SERPL CALC-MCNC: 88 MG/DL (ref 0–100)
LYMPHOCYTES # BLD: 0.8 K/UL (ref 0.5–4.6)
LYMPHOCYTES NFR BLD: 14 % (ref 13–44)
MCH RBC QN AUTO: 29.7 PG (ref 26.1–32.9)
MCHC RBC AUTO-ENTMCNC: 31.5 G/DL (ref 31.4–35)
MCV RBC AUTO: 94.3 FL (ref 82–102)
MONOCYTES # BLD: 0.6 K/UL (ref 0.1–1.3)
MONOCYTES NFR BLD: 10 % (ref 4–12)
NEUTS SEG # BLD: 4.2 K/UL (ref 1.7–8.2)
NEUTS SEG NFR BLD: 71 % (ref 43–78)
NRBC # BLD: 0 K/UL (ref 0–0.2)
PLATELET # BLD AUTO: 157 K/UL (ref 150–450)
PMV BLD AUTO: 11.1 FL (ref 9.4–12.3)
POTASSIUM SERPL-SCNC: 4.4 MMOL/L (ref 3.5–5.1)
PROT SERPL-MCNC: 6.5 G/DL (ref 6.3–8.2)
RBC # BLD AUTO: 4.91 M/UL (ref 4.05–5.2)
SODIUM SERPL-SCNC: 146 MMOL/L (ref 136–145)
TRIGL SERPL-MCNC: 137 MG/DL (ref 0–150)
VLDLC SERPL CALC-MCNC: 27 MG/DL (ref 6–23)
WBC # BLD AUTO: 5.9 K/UL (ref 4.3–11.1)

## 2024-10-24 DIAGNOSIS — M81.0 AGE-RELATED OSTEOPOROSIS WITHOUT CURRENT PATHOLOGICAL FRACTURE: ICD-10-CM

## 2024-10-24 DIAGNOSIS — E55.9 VITAMIN D DEFICIENCY: Primary | ICD-10-CM
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
## 2024-10-24 PROBLEM — L97.922 NON-PRESSURE CHRONIC ULCER OF LEFT LOWER LEG, WITH FAT LAYER EXPOSED (HCC): Chronic | Status: RESOLVED | Noted: 2024-08-12 | Resolved: 2024-10-24

## 2024-10-24 PROBLEM — M25.562 LEFT KNEE PAIN: Status: RESOLVED | Noted: 2017-05-30 | Resolved: 2024-10-24

## 2024-10-24 PROBLEM — R53.83 FATIGUE: Status: RESOLVED | Noted: 2019-03-07 | Resolved: 2024-10-24

## 2024-10-24 PROBLEM — L03.116 CELLULITIS OF LEFT LEG: Chronic | Status: RESOLVED | Noted: 2024-08-12 | Resolved: 2024-10-24

## 2024-10-24 PROBLEM — G25.2 TREMOR, COARSE: Status: RESOLVED | Noted: 2019-10-12 | Resolved: 2024-10-24

## 2024-10-24 PROBLEM — M25.551 PAIN OF RIGHT HIP JOINT: Status: RESOLVED | Noted: 2019-10-12 | Resolved: 2024-10-24

## 2024-12-03 ENCOUNTER — OFFICE VISIT (OUTPATIENT)
Dept: NEUROLOGY | Age: 84
End: 2024-12-03
Payer: MEDICARE

## 2024-12-03 VITALS
DIASTOLIC BLOOD PRESSURE: 80 MMHG | HEART RATE: 54 BPM | BODY MASS INDEX: 23.06 KG/M2 | OXYGEN SATURATION: 98 % | SYSTOLIC BLOOD PRESSURE: 166 MMHG | WEIGHT: 134.4 LBS

## 2024-12-03 DIAGNOSIS — F03.B0 MODERATE DEMENTIA WITHOUT BEHAVIORAL DISTURBANCE, PSYCHOTIC DISTURBANCE, MOOD DISTURBANCE, OR ANXIETY, UNSPECIFIED DEMENTIA TYPE (HCC): Primary | ICD-10-CM

## 2024-12-03 DIAGNOSIS — Z86.73 OLD LACUNAR STROKE WITHOUT LATE EFFECT: ICD-10-CM

## 2024-12-03 PROCEDURE — 3079F DIAST BP 80-89 MM HG: CPT | Performed by: PSYCHIATRY & NEUROLOGY

## 2024-12-03 PROCEDURE — G8400 PT W/DXA NO RESULTS DOC: HCPCS | Performed by: PSYCHIATRY & NEUROLOGY

## 2024-12-03 PROCEDURE — 99205 OFFICE O/P NEW HI 60 MIN: CPT | Performed by: PSYCHIATRY & NEUROLOGY

## 2024-12-03 PROCEDURE — 1090F PRES/ABSN URINE INCON ASSESS: CPT | Performed by: PSYCHIATRY & NEUROLOGY

## 2024-12-03 PROCEDURE — G8427 DOCREV CUR MEDS BY ELIG CLIN: HCPCS | Performed by: PSYCHIATRY & NEUROLOGY

## 2024-12-03 PROCEDURE — 1123F ACP DISCUSS/DSCN MKR DOCD: CPT | Performed by: PSYCHIATRY & NEUROLOGY

## 2024-12-03 PROCEDURE — G8484 FLU IMMUNIZE NO ADMIN: HCPCS | Performed by: PSYCHIATRY & NEUROLOGY

## 2024-12-03 PROCEDURE — 1159F MED LIST DOCD IN RCRD: CPT | Performed by: PSYCHIATRY & NEUROLOGY

## 2024-12-03 PROCEDURE — G8420 CALC BMI NORM PARAMETERS: HCPCS | Performed by: PSYCHIATRY & NEUROLOGY

## 2024-12-03 PROCEDURE — 1160F RVW MEDS BY RX/DR IN RCRD: CPT | Performed by: PSYCHIATRY & NEUROLOGY

## 2024-12-03 PROCEDURE — 3077F SYST BP >= 140 MM HG: CPT | Performed by: PSYCHIATRY & NEUROLOGY

## 2024-12-03 PROCEDURE — 1036F TOBACCO NON-USER: CPT | Performed by: PSYCHIATRY & NEUROLOGY

## 2024-12-03 NOTE — PROGRESS NOTES
[facility] NEUROLOGY NOTE    Patient: Chloe Ta  Physician: Logan Guerra MD    CC:   Chief Complaint   Patient presents with    New Patient     PCP: Mindy Dey MD  Referred by: Mindy Dey*     History of Present Illness:     Chloe Ta is a 84 y.o. pleasant and attentive female with PMH of HTN, HLD, old lacunar stroke (left thalamocapsular region from my review of MRI, no residual deficit), presents for consultation on memory difficulties. She is fluent in her language, provides the majority of her history independently, accompanied by her daughter who was sitting next to her.  Patient reports that she lives mostly on her own in a trailer on her daughter's property, who regularly checks on her.  No hallucinations, no RBD, no bizarre behavior, no significant mood issues.  Discussed other factors including sleep and B12 supplementation.    MOCA Score 15/30. Today 12/3/24.  VS/Exec 1/2. Clock drawing 2/3.   Naming 3/3.   Delayed recall 1/5.   Attention 2/6 (Digit span 1/2; Letters 1/1; Serial 7s 0/3).   Language 2/3 (Sentence repeat 2/2; Fluency 0/1 - # B words = 4).   Abstraction 2/2.   Orientation 3/6.          Review of Systems:   All systems were reviewed and all relevant findings are addressed in the history and exam.   Neurological ROS per HPI.    Lab/Imaging Review:   I reviewed pertinent labs, images, and reports, explaining or showing relevant findings to the patient.    MRI Result (most recent):  MRI BRAIN W WO CONTRAST 08/12/2024    Narrative  MRI of the brain    INDICATION: Other amnesia; Other general symptoms and signs, history of breast  cancer    COMPARISON:  None    Standard MRI sequences were obtained through the brain in multiple planes.  Images were obtained before and after intravenous infusion of 12 mL of ProHance.    FINDINGS:  There are no areas of restricted diffusion. There is no evidence of acute  hemorrhage or infarction.  The ventricles are

## 2024-12-09 ENCOUNTER — TELEPHONE (OUTPATIENT)
Dept: NEUROLOGY | Age: 84
End: 2024-12-09

## 2024-12-11 ENCOUNTER — TELEPHONE (OUTPATIENT)
Dept: INTERNAL MEDICINE CLINIC | Facility: CLINIC | Age: 84
End: 2024-12-11

## 2024-12-11 DIAGNOSIS — G30.1 MODERATE LATE ONSET ALZHEIMER'S DEMENTIA WITHOUT BEHAVIORAL DISTURBANCE, PSYCHOTIC DISTURBANCE, MOOD DISTURBANCE, OR ANXIETY (HCC): Primary | ICD-10-CM

## 2024-12-11 DIAGNOSIS — F02.B0 MODERATE LATE ONSET ALZHEIMER'S DEMENTIA WITHOUT BEHAVIORAL DISTURBANCE, PSYCHOTIC DISTURBANCE, MOOD DISTURBANCE, OR ANXIETY (HCC): Primary | ICD-10-CM

## 2024-12-11 RX ORDER — DONEPEZIL HYDROCHLORIDE 5 MG/1
5 TABLET, FILM COATED ORAL NIGHTLY
Qty: 30 TABLET | Refills: 3 | Status: SHIPPED | OUTPATIENT
Start: 2024-12-11

## 2024-12-21 ENCOUNTER — TELEPHONE (OUTPATIENT)
Dept: PRIMARY CARE CLINIC | Facility: CLINIC | Age: 84
End: 2024-12-21

## 2024-12-21 NOTE — TELEPHONE ENCOUNTER
No VM set up yet .  I'm Returning call from on call regarding question about medication .  Patient call about a prescription she is taking she don't know what it is.     I called daughter as well no VM set up yet .

## 2024-12-23 ENCOUNTER — OFFICE VISIT (OUTPATIENT)
Dept: ORTHOPEDIC SURGERY | Age: 84
End: 2024-12-23
Payer: MEDICARE

## 2024-12-23 DIAGNOSIS — M54.50 LOW BACK PAIN AT MULTIPLE SITES: ICD-10-CM

## 2024-12-23 DIAGNOSIS — G89.29 CHRONIC PAIN OF RIGHT KNEE: ICD-10-CM

## 2024-12-23 DIAGNOSIS — Z96.652 HISTORY OF TOTAL KNEE ARTHROPLASTY, LEFT: ICD-10-CM

## 2024-12-23 DIAGNOSIS — M25.561 CHRONIC PAIN OF RIGHT KNEE: ICD-10-CM

## 2024-12-23 DIAGNOSIS — M70.62 GREATER TROCHANTERIC BURSITIS OF LEFT HIP: ICD-10-CM

## 2024-12-23 DIAGNOSIS — M25.562 LEFT KNEE PAIN, UNSPECIFIED CHRONICITY: Primary | ICD-10-CM

## 2024-12-23 DIAGNOSIS — Z96.651 HISTORY OF TOTAL KNEE ARTHROPLASTY, RIGHT: ICD-10-CM

## 2024-12-23 PROCEDURE — 99204 OFFICE O/P NEW MOD 45 MIN: CPT | Performed by: PHYSICIAN ASSISTANT

## 2024-12-23 PROCEDURE — G8484 FLU IMMUNIZE NO ADMIN: HCPCS | Performed by: PHYSICIAN ASSISTANT

## 2024-12-23 PROCEDURE — 1090F PRES/ABSN URINE INCON ASSESS: CPT | Performed by: PHYSICIAN ASSISTANT

## 2024-12-23 PROCEDURE — 1036F TOBACCO NON-USER: CPT | Performed by: PHYSICIAN ASSISTANT

## 2024-12-23 PROCEDURE — 1123F ACP DISCUSS/DSCN MKR DOCD: CPT | Performed by: PHYSICIAN ASSISTANT

## 2024-12-23 PROCEDURE — G8428 CUR MEDS NOT DOCUMENT: HCPCS | Performed by: PHYSICIAN ASSISTANT

## 2024-12-23 PROCEDURE — G8400 PT W/DXA NO RESULTS DOC: HCPCS | Performed by: PHYSICIAN ASSISTANT

## 2024-12-23 PROCEDURE — G8420 CALC BMI NORM PARAMETERS: HCPCS | Performed by: PHYSICIAN ASSISTANT

## 2024-12-23 PROCEDURE — 20610 DRAIN/INJ JOINT/BURSA W/O US: CPT | Performed by: PHYSICIAN ASSISTANT

## 2024-12-23 RX ORDER — METHYLPREDNISOLONE ACETATE 40 MG/ML
40 INJECTION, SUSPENSION INTRA-ARTICULAR; INTRALESIONAL; INTRAMUSCULAR; SOFT TISSUE ONCE
Status: COMPLETED | OUTPATIENT
Start: 2024-12-23 | End: 2024-12-23

## 2024-12-23 RX ADMIN — METHYLPREDNISOLONE ACETATE 40 MG: 40 INJECTION, SUSPENSION INTRA-ARTICULAR; INTRALESIONAL; INTRAMUSCULAR; SOFT TISSUE at 14:34

## 2024-12-23 NOTE — PROGRESS NOTES
MG CAPS Take 81 mg by mouth daily     No current facility-administered medications for this visit.       Past Medical history:  Past Medical History:   Diagnosis Date    Blindness of right eye     Cancer (HCC) 1978    bilat breast---- surg only    Cardiac murmur     previously documented- ECHO 3/28/19 EF 66% -RVSP 37 \"mild mitral regurgitation, physiologic TC and MV regurgitation\", at PAT appt 8/17/20 denies dizziness, CP, SOB. Denies any changes in activity tolerance and states cuts own grass with push mower without difficulty and can climb flight of stairs without SOB.     Disc degeneration, lumbar     Dizziness     Fibrocystic disease of breast     GERD (gastroesophageal reflux disease)     PRN medication    Hyperlipidemia     Hypertension     controlled with med    Imbalance     Osteoarthritis     generalized    Osteoporosis     S/P laminectomy with spinal fusion 5/30/2017    Sciatica     Spinal stenosis     Squamous cell carcinoma of scalp 2014    TIA (transient ischemic attack) 2008    lost sight in right eye    Tinnitus     Vertigo         has a past surgical history that includes Breast reconstruction (Bilateral, 1978); Breast reconstruction (Bilateral, 1994); orthopedic surgery (2016); Cataract removal (Bilateral, 2009); cyst removal (Right); Hemorrhoid surgery (1994); Mastectomy (Bilateral, 1978); hernia repair (Right, 2016); Colonoscopy; Hysterectomy (1969); other surgical history (2015); Total knee arthroplasty (Left, 2007); orthopedic surgery (Right, 2008); and Total knee arthroplasty (Right, 2009).     Family History:  [unfilled]     Social History:  [unfilled]    Review of Systems:       Physical Exam:    General:   Alert and oriented    Gait:          Normal gait with use of a cane    Back:        No tenderness over lower back.     BILATERAL Hip:    Skin is intact.  There is pain with palpation over the greater trochanter.          No groin pain and restricted ROM of the hip     BILATERAL

## 2024-12-24 ENCOUNTER — PATIENT MESSAGE (OUTPATIENT)
Dept: INTERNAL MEDICINE CLINIC | Facility: CLINIC | Age: 84
End: 2024-12-24

## 2024-12-27 NOTE — TELEPHONE ENCOUNTER
Spoke to Mrs. Castillo [phone]  Patient d/c Donepezil on 12/23/24, as per patient it was causing her to hear noises/conversations- like medication was stimulating her brain too much.     Pt's daughter has an appointment with  on Monday, she stated she will go over concern with her on that day.

## 2025-01-17 ENCOUNTER — TELEPHONE (OUTPATIENT)
Dept: INTERNAL MEDICINE CLINIC | Facility: CLINIC | Age: 85
End: 2025-01-17

## 2025-01-17 NOTE — TELEPHONE ENCOUNTER
Hello,     Our mutual patient, Ms Ta was started on Aricept as you had suggested.   She has had hallucinations which she associated with use of the medication, She stopped the medication on 12/23/24.  In speaking with her daughter , the hallucinations stopped when she discontinued Aricept. I did not recommend another medication. Currently she is not on any medication for her memory issues.   I mentioned to her daughter that I would inform you of the change in her care.

## 2025-02-27 ENCOUNTER — OFFICE VISIT (OUTPATIENT)
Dept: INTERNAL MEDICINE CLINIC | Facility: CLINIC | Age: 85
End: 2025-02-27
Payer: MEDICARE

## 2025-02-27 ENCOUNTER — LAB (OUTPATIENT)
Dept: FAMILY MEDICINE CLINIC | Facility: CLINIC | Age: 85
End: 2025-02-27

## 2025-02-27 ENCOUNTER — TELEPHONE (OUTPATIENT)
Dept: FAMILY MEDICINE CLINIC | Facility: CLINIC | Age: 85
End: 2025-02-27

## 2025-02-27 VITALS
TEMPERATURE: 98.1 F | WEIGHT: 134 LBS | DIASTOLIC BLOOD PRESSURE: 100 MMHG | BODY MASS INDEX: 22.88 KG/M2 | RESPIRATION RATE: 12 BRPM | HEIGHT: 64 IN | HEART RATE: 58 BPM | SYSTOLIC BLOOD PRESSURE: 212 MMHG

## 2025-02-27 DIAGNOSIS — I10 ESSENTIAL (PRIMARY) HYPERTENSION: ICD-10-CM

## 2025-02-27 DIAGNOSIS — G30.1 MODERATE LATE ONSET ALZHEIMER'S DEMENTIA WITHOUT BEHAVIORAL DISTURBANCE, PSYCHOTIC DISTURBANCE, MOOD DISTURBANCE, OR ANXIETY (HCC): ICD-10-CM

## 2025-02-27 DIAGNOSIS — F02.B0 MODERATE LATE ONSET ALZHEIMER'S DEMENTIA WITHOUT BEHAVIORAL DISTURBANCE, PSYCHOTIC DISTURBANCE, MOOD DISTURBANCE, OR ANXIETY (HCC): ICD-10-CM

## 2025-02-27 DIAGNOSIS — E78.5 HYPERLIPIDEMIA, UNSPECIFIED HYPERLIPIDEMIA TYPE: ICD-10-CM

## 2025-02-27 DIAGNOSIS — I10 ELEVATED BLOOD PRESSURE READING IN OFFICE WITH DIAGNOSIS OF HYPERTENSION: Primary | ICD-10-CM

## 2025-02-27 DIAGNOSIS — H54.40 BLINDNESS OF RIGHT EYE, UNSPECIFIED LEFT EYE VISUAL IMPAIRMENT CATEGORY: ICD-10-CM

## 2025-02-27 DIAGNOSIS — M81.0 AGE-RELATED OSTEOPOROSIS WITHOUT CURRENT PATHOLOGICAL FRACTURE: ICD-10-CM

## 2025-02-27 DIAGNOSIS — I10 ELEVATED BLOOD PRESSURE READING IN OFFICE WITH DIAGNOSIS OF HYPERTENSION: ICD-10-CM

## 2025-02-27 LAB
ALBUMIN SERPL-MCNC: 3.9 G/DL (ref 3.2–4.6)
ALBUMIN/GLOB SERPL: 1.3 (ref 1–1.9)
ALP SERPL-CCNC: 130 U/L (ref 35–104)
ALT SERPL-CCNC: 21 U/L (ref 8–45)
ANION GAP SERPL CALC-SCNC: 8 MMOL/L (ref 7–16)
AST SERPL-CCNC: 28 U/L (ref 15–37)
BILIRUB SERPL-MCNC: 0.7 MG/DL (ref 0–1.2)
BUN SERPL-MCNC: 19 MG/DL (ref 8–23)
CALCIUM SERPL-MCNC: 9.2 MG/DL (ref 8.8–10.2)
CHLORIDE SERPL-SCNC: 106 MMOL/L (ref 98–107)
CO2 SERPL-SCNC: 29 MMOL/L (ref 20–29)
CREAT SERPL-MCNC: 0.76 MG/DL (ref 0.6–1.1)
GLOBULIN SER CALC-MCNC: 2.9 G/DL (ref 2.3–3.5)
GLUCOSE SERPL-MCNC: 86 MG/DL (ref 70–99)
POTASSIUM SERPL-SCNC: 4.1 MMOL/L (ref 3.5–5.1)
PROT SERPL-MCNC: 6.7 G/DL (ref 6.3–8.2)
SODIUM SERPL-SCNC: 143 MMOL/L (ref 136–145)
TSH, 3RD GENERATION: 2.97 UIU/ML (ref 0.27–4.2)

## 2025-02-27 PROCEDURE — 1036F TOBACCO NON-USER: CPT | Performed by: INTERNAL MEDICINE

## 2025-02-27 PROCEDURE — 3077F SYST BP >= 140 MM HG: CPT | Performed by: INTERNAL MEDICINE

## 2025-02-27 PROCEDURE — G8427 DOCREV CUR MEDS BY ELIG CLIN: HCPCS | Performed by: INTERNAL MEDICINE

## 2025-02-27 PROCEDURE — G8420 CALC BMI NORM PARAMETERS: HCPCS | Performed by: INTERNAL MEDICINE

## 2025-02-27 PROCEDURE — 99215 OFFICE O/P EST HI 40 MIN: CPT | Performed by: INTERNAL MEDICINE

## 2025-02-27 PROCEDURE — 3080F DIAST BP >= 90 MM HG: CPT | Performed by: INTERNAL MEDICINE

## 2025-02-27 PROCEDURE — 1090F PRES/ABSN URINE INCON ASSESS: CPT | Performed by: INTERNAL MEDICINE

## 2025-02-27 PROCEDURE — 1123F ACP DISCUSS/DSCN MKR DOCD: CPT | Performed by: INTERNAL MEDICINE

## 2025-02-27 PROCEDURE — G8400 PT W/DXA NO RESULTS DOC: HCPCS | Performed by: INTERNAL MEDICINE

## 2025-02-27 PROCEDURE — 1126F AMNT PAIN NOTED NONE PRSNT: CPT | Performed by: INTERNAL MEDICINE

## 2025-02-27 RX ORDER — LISINOPRIL 5 MG/1
5 TABLET ORAL DAILY
Qty: 90 TABLET | Refills: 2 | Status: SHIPPED | OUTPATIENT
Start: 2025-02-27

## 2025-02-27 NOTE — PROGRESS NOTES
(!) 166/80   10/21/24 (!) 178/80        Physical Exam  General appearance:Well looking , No distress Alert and oriented X 3. Well nourished and well hydrated  Head: Normocephalic, atraumatic  Eyes: conjunctivae clear.   Neck: Supple, No carotid bruit, no thyromegaly, no adenopathy  Resp: normal effort. Chest clear  Heart: RRR. Normal heart sounds .   Abdomen: Soft, non-tender, no masses , no organomegaly.  Normal bowel sounds  Extremities: No edema  Neurology: no Focal deficits  Psychology: normal affect. Mood is normal     Mindy Dey MD FACP

## 2025-02-28 ENCOUNTER — CARE COORDINATION (OUTPATIENT)
Dept: CARE COORDINATION | Facility: CLINIC | Age: 85
End: 2025-02-28

## 2025-02-28 ENCOUNTER — TELEPHONE (OUTPATIENT)
Dept: PHARMACY | Facility: CLINIC | Age: 85
End: 2025-02-28

## 2025-02-28 DIAGNOSIS — I10 ESSENTIAL HYPERTENSION: Primary | ICD-10-CM

## 2025-02-28 NOTE — TELEPHONE ENCOUNTER
----- Message from DALIA AWAD RN sent at 2/28/2025  8:10 AM EST -----  Regarding: referral  Review medications. Daughter and patient are unsure what is being taken

## 2025-02-28 NOTE — TELEPHONE ENCOUNTER
CLINICAL PHARMACY NOTE - [facility] Pharm[facility] Referral    Patient can be scheduled with:  Team Schedule- General Referrals, etc.    Received a referral from: Care Coordinator to discuss patient’s medications. Called patient to schedule a time to speak with a pharmacist over the telephone.   Spoke to patient and advised them of the above message.  Patient verified understanding and scheduled their appointment: tomorrow at 1:30pm    Telephone appt will be with patient    Patient maybe interested in mail order options    Amirah Figueroa CPhT.   [facility] Clinical   [facility]  Toll free: [phone] Option 1    Patient is located in [place]. Please schedule with [place] Pharmacists, Marii or Janie DUMONT for licensing purposes.

## 2025-02-28 NOTE — CARE COORDINATION
Ambulatory Care Coordination Note     2025 8:18 AM     Patient Current Location:  [place]     This patient was received as a referral from Ambulatory Care Manager .    ACM contacted the patient by telephone. Verified name and  with patient as identifiers. Provided introduction to self, and explanation of the ACM role.   Patient accepted care management services at this time.          ACM: Salud Sousa RN     Challenges to be reviewed by the provider   Additional needs identified to be addressed with provider No  none               Method of communication with provider: none.    Utilization: Initial Call - N/A    Care Summary Note: see assessment. Ccm outreached to patient. Referral received by pcp. Ccm discussed with patient regarding rpm. Patient states she is agreeable at this time. Order placed. Referral also sent to  regarding medicaid issues. Referral also placed to pharmacy to review medications. Patient states no questions or concerns at this time. [facility] discussed that I would outreach next week. Patient agreeable.    Offered patient enrollment in the Remote Patient Monitoring (RPM) program for in-home monitoring: Yes, patient enrolled today:     Remote Patient Monitoring Enrollment Note    Date/Time:  2025 8:20 AM    Offered patient enrollment in the [facility] Remote Patient Monitoring (RPM) program for in home monitoring for HTN; condition managed by pcp.  Patient accepted.    Patient will be monitoring the following daily:  Blood Pressure and pulse    ACM reviewed the information below with the patient:    Emergency Contact (name and contact number): meaghan randolph [phone]    [x]  A member from the care coordination team will reach out to notify the patient once the RPM kit is ordered.  [x]  Once the kit is delivered, the HRS team will contact the patient after UPS delivers to assist with set up.  [x]  Determined BP cuff size: regular (9.05\"-15.74\")  [x]

## 2025-02-28 NOTE — PROGRESS NOTES
Remote Patient Monitoring Treatment Plan    Received request from [facility]/Salud Chamorro RN to order remote patient monitoring for in home monitoring of HTN; Condition managed by PCP.  and order completed.     Patient will be monitoring blood pressure .      Patient will engage in Remote Patient Monitoring each day to develop the skills necessary for self management.       RPM Care Team Responsibilities:   Alerts will be reviewed daily and addressed within 2-4 hours during operational hours (Monday -Friday 9 am-4 pm)  Alert response and intervention documented in patient medical record  Alert response escalated to PCP per protocol and documented in patient medical record  Patient monitored over approximately  days  Discharge from program based on self-management readiness    See care coordination encounters for additional details.

## 2025-03-03 ENCOUNTER — CARE COORDINATION (OUTPATIENT)
Facility: CLINIC | Age: 85
End: 2025-03-03

## 2025-03-03 ENCOUNTER — CARE COORDINATION (OUTPATIENT)
Dept: CARE COORDINATION | Facility: CLINIC | Age: 85
End: 2025-03-03

## 2025-03-03 NOTE — CARE COORDINATION
Remote Patient Kit Ordering Note      Date/Time:  3/3/2025 12:04 PM      CCSS placed phone call to patient/family today to notify of RPM kit order; patient/family was available; discussed the following topics below and all questions answered.    [x] CCSS confirmed patient shipping address  [x] Patient will receive package over the next 1-3 business days. Someone 21 years or older must be present to sign for UPS delivery.  [x] HRS will contact patient within 24 hours, an HRS  will call the patient directly: If the patient does not answer, HRS will follow up with the clinical team notifying them about the unsuccessful attempt to contact the patient. HRS will make three call attempts to the patient.Provide patient with [facility] Virtual install number is: [phone].  [x] The RPM Nurse will contact patient once equipment is active to welcome them to the program.                                                         [x] Hours of RPM monitoring - Monday-Friday [id number]; encourage patient to get vitals entered by Noon each day to have the alert addressed same day.  [x][facility].                      ACM made aware the RPM kit has been ordered.

## 2025-03-03 NOTE — CARE COORDINATION
Patient states that she did receive confirmation that her Medicaid coverage was suspended due to her drawing too much social security. She has agreed to another call on tomorrow to discuss her current coverage in-depth when she returns home. Patient was at her daughter's home today.

## 2025-03-04 ENCOUNTER — TELEPHONE (OUTPATIENT)
Dept: PHARMACY | Facility: CLINIC | Age: 85
End: 2025-03-04

## 2025-03-04 ENCOUNTER — CARE COORDINATION (OUTPATIENT)
Dept: CARE COORDINATION | Facility: CLINIC | Age: 85
End: 2025-03-04

## 2025-03-04 DIAGNOSIS — I10 ESSENTIAL (PRIMARY) HYPERTENSION: ICD-10-CM

## 2025-03-04 DIAGNOSIS — E78.5 HYPERLIPIDEMIA, UNSPECIFIED HYPERLIPIDEMIA TYPE: ICD-10-CM

## 2025-03-04 RX ORDER — CHOLECALCIFEROL (VITAMIN D3) 50 MCG
4000 TABLET ORAL DAILY
COMMUNITY

## 2025-03-04 NOTE — TELEPHONE ENCOUNTER
Mindy Dey MD, medication review completed with patient:    - Patient interested in getting set up with mail order pharmacy due to transportation issues getting to the pharmacy to get her medications picked up.     - Patient requesting all new 90 day prescriptions of her lisinopril, lovastatin, metoprolol sent to [facility] home delivery [facility] for her to get set up with them.     See note below for complete details.     Thank you,  Marii Mohamud, PharmD, [facility]  [facility] Clinical Pharmacist  Department: [phone]  =======================================================    CLINICAL PHARMACY NOTE - Medication Review  Patient outreach to review medications - Spoke with patient.      SUBJECTIVE/OBJECTIVE:   Chloe Ta is a 84 y.o. female referred to a clinical pharmacy specialist by care coordination and/or given their history of complete medication review.    [place] Daughter is not available at time of the call.     Medications:  Current Outpatient Medications   Medication Instructions    Aspirin 81 mg, Oral, DAILY  Takes in the morning in between 2-6 am (whenever she wakes up)    cholecalciferol (VITAMIN D3) 400 Units, Oral, 2 TIMES DAILY   Is buying this OTC 2000 units - and taking 2 tabs, 4000 units daily    cyanocobalamin 1,000 mcg, Oral, DAILY  Takes AM but separate from her other 3 morning pills. Takes this after she eats breakfast.     lisinopril (PRINIVIL;ZESTRIL) 5 mg, Oral, DAILY  Takes in the morning in between 2-6 am  Picked up 2/27/25 for 90 ds, too soon to refill but patient states she does not have it at home. She then was able to find a bottle.     lovastatin (MEVACOR) 10 mg, Oral, NIGHTLY  Takes nightly with second dose of metoprolol  Picked up 2/3/25 for 90 ds, too soon to refill, pt states she can't find this bottle either. [facility] says they can fill for cash price $13 for 90 ds. Patient agreeable

## 2025-03-06 ENCOUNTER — CARE COORDINATION (OUTPATIENT)
Dept: CARE COORDINATION | Facility: CLINIC | Age: 85
End: 2025-03-06

## 2025-03-06 ENCOUNTER — CARE COORDINATION (OUTPATIENT)
Dept: CARE COORDINATION | Age: 85
End: 2025-03-06

## 2025-03-06 NOTE — CARE COORDINATION
nearest Emergency Room.**    Plan/Follow Up: Will continue to review, monitor and address alerts with follow up based on severity of symptoms and risk factors.    SINDI Lizarraga [facility]/ CTN/ Remote Patient monitoring  [phone]

## 2025-03-06 NOTE — CARE COORDINATION
RPM Verification and Welcome Call Successful? Yes,     Remote Patient Monitoring Welcome Note  Date/Time:  3/6/2025 2:44 PM  Patient Current Location: [place]  Verified patients name and  as identifiers.       Completed and confirmed the following:    [x] Patient received all RPM equipment (tablet, scale, blood pressure device and cuff, and pulse oximeter)  Cuff Size: regular (9.05\"-15.74\")    Weight Scale:  regular (<330lbs)                    [x] Instructed patient keep box for use when returning equipment                                                          [x] Reviewed Patient Welcome Letter with patient    [x]  Reviewed Consent Form  Copy of consent form in HRS; has not been uploaded to Epic as of this time.                 [x] Reviewed expectations for patient and care team  Monitoring hours M-F 9-4pm  It is important to take your vitals every day, even on the weekends,to keep your care team aware of how you are doing every day of the week.  Completing monitoring by 12pm on  so that alerts can be responded to in the same day  Patient weighs self at same time every day (or after urinating and waking up) N/A weight not being monitored in RPM  Take blood pressure 1-2 hrs after medications   RPM team may have different phone area code (including [place], [place], [place] or [place])                              [] Instructed patient to keep scale on flat surface N/A weight not being monitored in RPM                                                       [x] Instructed patient to keep tablet plugged in at all times                         [x] Instructed how to contact IT support  ([phone])  [x] Provided Remote Patient Monitoring care  information     Emergency Contact Verified: Anna Holt [phone]               All questions answered at this time. Will update ACM and CCSS. Updated BP reading is within RPM parameters.

## 2025-03-10 ENCOUNTER — HOSPITAL ENCOUNTER (OUTPATIENT)
Dept: GENERAL RADIOLOGY | Age: 85
Discharge: HOME | End: 2025-03-12
Payer: MEDICARE

## 2025-03-10 ENCOUNTER — OFFICE VISIT (OUTPATIENT)
Dept: INTERNAL MEDICINE CLINIC | Facility: CLINIC | Age: 85
End: 2025-03-10
Payer: MEDICARE

## 2025-03-10 ENCOUNTER — CARE COORDINATION (OUTPATIENT)
Dept: CARE COORDINATION | Facility: CLINIC | Age: 85
End: 2025-03-10

## 2025-03-10 VITALS
WEIGHT: 140.2 LBS | SYSTOLIC BLOOD PRESSURE: 200 MMHG | BODY MASS INDEX: 23.93 KG/M2 | RESPIRATION RATE: 18 BRPM | OXYGEN SATURATION: 98 % | HEART RATE: 52 BPM | DIASTOLIC BLOOD PRESSURE: 90 MMHG | HEIGHT: 64 IN | TEMPERATURE: 97.7 F

## 2025-03-10 DIAGNOSIS — M48.062 SPINAL STENOSIS, LUMBAR REGION, WITH NEUROGENIC CLAUDICATION: ICD-10-CM

## 2025-03-10 DIAGNOSIS — M54.41 MIDLINE LOW BACK PAIN WITH RIGHT-SIDED SCIATICA, UNSPECIFIED CHRONICITY: ICD-10-CM

## 2025-03-10 DIAGNOSIS — M81.0 AGE-RELATED OSTEOPOROSIS WITHOUT CURRENT PATHOLOGICAL FRACTURE: ICD-10-CM

## 2025-03-10 DIAGNOSIS — I10 ESSENTIAL (PRIMARY) HYPERTENSION: ICD-10-CM

## 2025-03-10 DIAGNOSIS — Z98.1 S/P LAMINECTOMY WITH SPINAL FUSION: ICD-10-CM

## 2025-03-10 DIAGNOSIS — I10 ELEVATED BLOOD PRESSURE READING IN OFFICE WITH DIAGNOSIS OF HYPERTENSION: ICD-10-CM

## 2025-03-10 DIAGNOSIS — M81.0 AGE-RELATED OSTEOPOROSIS WITHOUT CURRENT PATHOLOGICAL FRACTURE: Primary | ICD-10-CM

## 2025-03-10 PROCEDURE — 1125F AMNT PAIN NOTED PAIN PRSNT: CPT | Performed by: INTERNAL MEDICINE

## 2025-03-10 PROCEDURE — 1036F TOBACCO NON-USER: CPT | Performed by: INTERNAL MEDICINE

## 2025-03-10 PROCEDURE — G8420 CALC BMI NORM PARAMETERS: HCPCS | Performed by: INTERNAL MEDICINE

## 2025-03-10 PROCEDURE — 1159F MED LIST DOCD IN RCRD: CPT | Performed by: INTERNAL MEDICINE

## 2025-03-10 PROCEDURE — 3080F DIAST BP >= 90 MM HG: CPT | Performed by: INTERNAL MEDICINE

## 2025-03-10 PROCEDURE — 99215 OFFICE O/P EST HI 40 MIN: CPT | Performed by: INTERNAL MEDICINE

## 2025-03-10 PROCEDURE — 1090F PRES/ABSN URINE INCON ASSESS: CPT | Performed by: INTERNAL MEDICINE

## 2025-03-10 PROCEDURE — 72110 X-RAY EXAM L-2 SPINE 4/>VWS: CPT

## 2025-03-10 PROCEDURE — G8400 PT W/DXA NO RESULTS DOC: HCPCS | Performed by: INTERNAL MEDICINE

## 2025-03-10 PROCEDURE — 3077F SYST BP >= 140 MM HG: CPT | Performed by: INTERNAL MEDICINE

## 2025-03-10 PROCEDURE — G8427 DOCREV CUR MEDS BY ELIG CLIN: HCPCS | Performed by: INTERNAL MEDICINE

## 2025-03-10 PROCEDURE — 1123F ACP DISCUSS/DSCN MKR DOCD: CPT | Performed by: INTERNAL MEDICINE

## 2025-03-10 SDOH — ECONOMIC STABILITY: FOOD INSECURITY: WITHIN THE PAST 12 MONTHS, THE FOOD YOU BOUGHT JUST DIDN'T LAST AND YOU DIDN'T HAVE MONEY TO GET MORE.: NEVER TRUE

## 2025-03-10 SDOH — ECONOMIC STABILITY: FOOD INSECURITY: WITHIN THE PAST 12 MONTHS, YOU WORRIED THAT YOUR FOOD WOULD RUN OUT BEFORE YOU GOT MONEY TO BUY MORE.: NEVER TRUE

## 2025-03-10 ASSESSMENT — PATIENT HEALTH QUESTIONNAIRE - PHQ9
SUM OF ALL RESPONSES TO PHQ QUESTIONS 1-9: 0
1. LITTLE INTEREST OR PLEASURE IN DOING THINGS: NOT AT ALL
SUM OF ALL RESPONSES TO PHQ QUESTIONS 1-9: 0
2. FEELING DOWN, DEPRESSED OR HOPELESS: NOT AT ALL

## 2025-03-10 NOTE — PROGRESS NOTES
3/10/2025    Chief Complaint   Patient presents with    Hypertension     C/o back pain feeling worse since she had back surgery years ago       Assessment and plan     1. Age-related osteoporosis without current pathological fracture  -     XR LUMBAR SPINE (MIN 4 VIEWS); Future  2. Spinal stenosis, lumbar region, with neurogenic claudication  -     XR LUMBAR SPINE (MIN 4 VIEWS); Future  3. S/P laminectomy with spinal fusion  -     XR LUMBAR SPINE (MIN 4 VIEWS); Future  4. Midline low back pain with right-sided sciatica, unspecified chronicity  -     XR LUMBAR SPINE (MIN 4 VIEWS); Future  5. Essential (primary) hypertension  6. Elevated blood pressure reading in office with diagnosis of hypertension     Patient with worsening back pain with no radiculopathy. She is doing a little better today .  Examination today is remarkable only for back tenderness.  She has no neurologic symptoms.  Will get x-rays of her back to rule out any fractures or loose hardware.  If symptoms persist she will need to see orthopedics again.  She can try local heat, local ointments Tylenol, or nonsteroidal anti-inflammatory drugs help with pain.    Patient's blood pressure again is extremely high in the office today but she seems to be having good blood pressures at home. (Today she repeats that her blood pressure is high when checked in the office because her arms hurts too much with the inflation of the blood pressure cuff] I have made no changes in her management.  Since she seems to have remote blood pressure monitoring I will wait to see if we get any calls or need for labs to respect her blood pressure.  Recent thyroid test and basic metabolic panel were normal.    Today I reviewed the note from pharmacist to both patient and her daughter.  They voiced understanding.  I will send a prescription for medications to the mail order pharmacy.        On 3/10/25 I  have spent 42 minutes reviewing previous notes, test results and face to face

## 2025-03-10 NOTE — CARE COORDINATION
Ambulatory Care Coordination Note     3/10/2025 9:20 AM     Patient Current Location:  [place]     ACM contacted the patient by telephone. Verified name and  with patient as identifiers.         ACM: Salud Sousa RN     Challenges to be reviewed by the provider   Additional needs identified to be addressed with provider No  none               Method of communication with provider: none.    Utilization: Patient has not had any utilization since our last call.     Care Summary Note: ccm outreached to patient. Patient states she has back pain today and is causing her to ambulate with difficulty. Patient states she is using walker to ambulate. Patient states she has appointment with pcp today. Discussed with patient to notify pcp of back pain during appointment. Patient verbalized understanding. Patient states no questions or cocnerns. Ccm discussed that I would outreach next week. Patient agreeable.

## 2025-03-11 ENCOUNTER — RESULTS FOLLOW-UP (OUTPATIENT)
Dept: GENERAL RADIOLOGY | Age: 85
End: 2025-03-11

## 2025-03-11 RX ORDER — LOVASTATIN 10 MG/1
10 TABLET ORAL NIGHTLY
Qty: 90 TABLET | Refills: 3 | Status: SHIPPED | OUTPATIENT
Start: 2025-03-11

## 2025-03-11 RX ORDER — LISINOPRIL 5 MG/1
5 TABLET ORAL DAILY
Qty: 90 TABLET | Refills: 3 | Status: SHIPPED | OUTPATIENT
Start: 2025-03-11

## 2025-03-11 RX ORDER — METOPROLOL TARTRATE 50 MG
50 TABLET ORAL 2 TIMES DAILY
Qty: 180 TABLET | Refills: 3 | Status: SHIPPED | OUTPATIENT
Start: 2025-03-11

## 2025-03-14 VITALS — SYSTOLIC BLOOD PRESSURE: 143 MMHG | DIASTOLIC BLOOD PRESSURE: 62 MMHG | HEART RATE: 68 BPM

## 2025-03-17 ENCOUNTER — CARE COORDINATION (OUTPATIENT)
Dept: CARE COORDINATION | Facility: CLINIC | Age: 85
End: 2025-03-17

## 2025-03-17 NOTE — CARE COORDINATION
Internal Medicine [phone]            Follow Up:   Plan for next ACM outreach in approximately 1 week to complete:  - goal progression  - education .   Patient  is agreeable to this plan.

## 2025-03-19 ENCOUNTER — CARE COORDINATION (OUTPATIENT)
Dept: CARE COORDINATION | Facility: CLINIC | Age: 85
End: 2025-03-19

## 2025-03-19 NOTE — CARE COORDINATION
3/19/2025 9:52 AM  *  RPM Alert   Remote Patient Monitoring Note      Date/Time:  3/19/2025 9:52 AM  Patient Current Location: [place]  LPN contacted patient by telephone regarding yellow alert received for no BP reading taken / updated x 2 days. Verified patients name and  as identifiers.  Background: Pt enrolled in RPM HTN  Clinical Interventions: Pt educated on RPM adherence. Pt v/u and states equipment \"will not turn on\". Attempted to troubleshoot with pt. Pt confirms equipment is plugged in and thinks \"a piece may be missing\". Pt declines being contacted by HRS tech support at this time and states she will contact tech support \"after I feed the goats and shower\". HRS tech support's contact information provided and read back correctly by pt. Pt also educated on RPM hours, when to notify provider(s) of changes or concerns and when to seek emergent medical care; pt v/u.   Plan/Follow Up: Will continue to review, monitor and address alerts with follow up based on severity of symptoms and risk factors.

## 2025-03-24 ENCOUNTER — CARE COORDINATION (OUTPATIENT)
Dept: CARE COORDINATION | Facility: CLINIC | Age: 85
End: 2025-03-24

## 2025-03-24 NOTE — CARE COORDINATION
Ambulatory Care Coordination Note     3/24/2025 9:07 AM     Patient Current Location:  [place]     ACM contacted the patient by telephone. Verified name and  with patient as identifiers.         ACM: Salud Sousa RN     Challenges to be reviewed by the provider   Additional needs identified to be addressed with provider No  none               Method of communication with provider: none.    Utilization: Patient has not had any utilization since our last call.     Care Summary Note: ccm outreached to patient. Patient states she is doing well. Patient states she is eating and drinking well. Continues rpm. Patient states no questions or concerns. Ccm dicussed that I would outreach next week. Patient agreeable.     Offered patient enrollment in the Remote Patient Monitoring (RPM) program for in-home monitoring: Yes, patient enrolled; current status is activated and monitoring.     Assessments Completed:   No changes since last call    Medications Reviewed:   Patient denies any changes with medications and reports taking all medications as prescribed.    Advance Care Planning:   Not reviewed during this call     Care Planning:   Education Documentation  No documentation found.  Education Comments  No comments found.     ,    Goals Addressed                   This Visit's Progress     Conditions and Symptoms   On track     I will schedule office visits, as directed by my provider.  I will keep my appointment or reschedule if I have to cancel.  I will notify my provider of any barriers to my plan of care.  I will notify my provider of any symptoms that indicate a worsening of my condition.    Barriers: overwhelmed by complexity of regimen  Plan for overcoming my barriers: weekly calls and rpm  Confidence: 8/10  Anticipated Goal Completion Date: 25                 PCP/Specialist follow up:   Future Appointments         Provider Specialty Dept Phone    2025 11:00 AM Nancy Easton DO Internal

## 2025-03-31 ENCOUNTER — CARE COORDINATION (OUTPATIENT)
Dept: CARE COORDINATION | Facility: CLINIC | Age: 85
End: 2025-03-31

## 2025-03-31 NOTE — CARE COORDINATION
Ambulatory Care Coordination Note     3/31/2025 9:12 AM     Patient Current Location:  [place]     ACM contacted the patient by telephone. Verified name and  with patient as identifiers.         ACM: Salud Sousa RN     Challenges to be reviewed by the provider   Additional needs identified to be addressed with provider No  none               Method of communication with provider: none.    Utilization: Patient has not had any utilization since our last call.     Care Summary Note: ccm outreached to patient. Patient states overall she is doing well. Patient states she is eating and drinking well. Pt states RPM tablet has not been working and she has written down her blood pressures. Friday reading was 156/71, 60, sat 157/53, 67, sun 170/70, 63. Ccm left message with rpm team notifying of tablet issue. They will reach out to patient. Patient states no questions or concerns. [facility] discussed that I would outreach next week. Patient agreeable.     Offered patient enrollment in the Remote Patient Monitoring (RPM) program for in-home monitoring: Yes, patient enrolled; current status is activated and monitoring.     Assessments Completed:   No changes since last call    Medications Reviewed:   Patient denies any changes with medications and reports taking all medications as prescribed.    Advance Care Planning:   Not reviewed during this call     Care Planning:   Education Documentation  No documentation found.  Education Comments  No comments found.     ,    Goals Addressed                   This Visit's Progress     Conditions and Symptoms   On track     I will schedule office visits, as directed by my provider.  I will keep my appointment or reschedule if I have to cancel.  I will notify my provider of any barriers to my plan of care.  I will notify my provider of any symptoms that indicate a worsening of my condition.    Barriers: overwhelmed by complexity of regimen  Plan for overcoming my barriers: weekly

## 2025-03-31 NOTE — CARE COORDINATION
3/31/2025 2:03 PM  *  RPM Alert   Remote Patient Monitoring Note      Date/Time:  3/31/2025 2:03 PM  Patient Current Location: [place]  LPN contacted patient by telephone regarding yellow alert received for no BP reading taken / updated x 5 days. Verified patients name and  as identifiers.  Background: Pt enrolled in RPM r/t HTN  Clinical Interventions: Pt educated on RPM adherence and v/u. Pt reports she was having issues with RPM tablet and BP reading not transmitting. Per ACM documentation from today, 25, \"Pt states RPM tablet has not been working and she has written down her blood pressures. Friday reading was 156/71, 60, sat 157/53, 67, sun 170/70, 63.\" Per documentation and communication received from Health , \"Tablet seems to be functioning fine now.\" Pt lives in a RV on her daughters property and keeps RPM equipment in daughters home. States she does not plan to update BP reading today due to weather concerns. Speaking in full clear sentences, denies CP, SOB, headache, vision changes, numbness and tingling at this time. Pt also educated on RPM hours, when to notify provider(s) of changes or concerns and when to seek emergent medical care; pt v/u. Will update ACM.   Plan/Follow Up: Will continue to review, monitor and address alerts with follow up based on severity of symptoms and risk factors.

## 2025-03-31 NOTE — CARE COORDINATION
Health  RPM Outreach    Called the patient at the request of ALBIN Sousa RN, AC, due to BP not transmitting to the tablet. Spoke with patient who advised the tablet was not at her home but at daughters home on the same property. Spoke with the daughter to troubleshoot the tablet. It was found to be off. Speaking with the patient again I learned that she had found the tablet unplugged a couple of days ago and plugged it back up but did not restart the tablet as she didn't know how. Tablet seems to be functioning fine now. Provided contact information to the daughter for further issues.

## 2025-04-07 ENCOUNTER — CARE COORDINATION (OUTPATIENT)
Dept: CARE COORDINATION | Facility: CLINIC | Age: 85
End: 2025-04-07

## 2025-04-07 NOTE — CARE COORDINATION
Ambulatory Care Coordination Note     2025 9:59 AM     Patient Current Location:  [place]     ACM contacted the patient by telephone. Verified name and  with patient as identifiers.         ACM: Salud Sousa RN     Challenges to be reviewed by the provider   Additional needs identified to be addressed with provider No  none               Method of communication with provider: none.    Utilization: Patient has not had any utilization since our last call.     Care Summary Note: ccm outreached to patient. Patient states overall she is doing well. Pt states no chest pain at this time. Pt states she is eating and drinking well. Pt states no questions or concerns. Ccm discussed that I would outreach next week. Pt agreeable.     Offered patient enrollment in the Remote Patient Monitoring (RPM) program for in-home monitoring: Yes, patient enrolled; current status is activated and monitoring.     Assessments Completed:   No changes since last call    Medications Reviewed:   Patient denies any changes with medications and reports taking all medications as prescribed.    Advance Care Planning:   Not reviewed during this call     Care Planning:   Education Documentation  No documentation found.  Education Comments  No comments found.     ,    Goals Addressed                   This Visit's Progress     Conditions and Symptoms   On track     I will schedule office visits, as directed by my provider.  I will keep my appointment or reschedule if I have to cancel.  I will notify my provider of any barriers to my plan of care.  I will notify my provider of any symptoms that indicate a worsening of my condition.    Barriers: overwhelmed by complexity of regimen  Plan for overcoming my barriers: weekly calls and rpm  Confidence: 8/10  Anticipated Goal Completion Date: 25                 PCP/Specialist follow up:   Future Appointments         Provider Specialty Dept Phone    2025 11:00 AM [place]

## 2025-04-14 ENCOUNTER — CARE COORDINATION (OUTPATIENT)
Dept: CARE COORDINATION | Facility: CLINIC | Age: 85
End: 2025-04-14

## 2025-04-14 NOTE — CARE COORDINATION
Ambulatory Care Coordination Note     2025 10:28 AM     Patient Current Location:  [place]     ACM contacted the patient by telephone. Verified name and  with patient as identifiers.         ACM: Salud Sousa RN     Challenges to be reviewed by the provider   Additional needs identified to be addressed with provider No  none               Method of communication with provider: none.    Utilization: Patient has not had any utilization since our last call.     Care Summary Note: ccm outreached to patient. Patient states she is doing well at this time. Patient states no questions or concerns. [facility] discussed that I would outreach next week. Patient agreeable.     Offered patient enrollment in the Remote Patient Monitoring (RPM) program for in-home monitoring: Yes, patient enrolled; current status is activated and monitoring.     Assessments Completed:   No changes since last call    Medications Reviewed:   Patient denies any changes with medications and reports taking all medications as prescribed.    Advance Care Planning:   Not reviewed during this call     Care Planning:   Education Documentation  No documentation found.  Education Comments  No comments found.     ,    Goals Addressed                   This Visit's Progress     Conditions and Symptoms   On track     I will schedule office visits, as directed by my provider.  I will keep my appointment or reschedule if I have to cancel.  I will notify my provider of any barriers to my plan of care.  I will notify my provider of any symptoms that indicate a worsening of my condition.    Barriers: overwhelmed by complexity of regimen  Plan for overcoming my barriers: weekly calls and rpm  Confidence: 8/10  Anticipated Goal Completion Date: 25                 PCP/Specialist follow up:   Future Appointments         Provider Specialty Dept Phone    4/15/2025 11:00 AM Lucy Kaiser APRN - NP Family Medicine [phone]    2025 11:00 AM

## 2025-04-21 ENCOUNTER — CARE COORDINATION (OUTPATIENT)
Dept: CARE COORDINATION | Facility: CLINIC | Age: 85
End: 2025-04-21

## 2025-04-21 NOTE — CARE COORDINATION
Ambulatory Care Coordination Note     2025 10:29 AM     Patient Current Location:  [place]     ACM contacted the patient by telephone. Verified name and  with patient as identifiers.         ACM: Salud Sousa RN     Challenges to be reviewed by the provider   Additional needs identified to be addressed with provider No  none               Method of communication with provider: none.    Utilization: Patient has not had any utilization since our last call.     Care Summary Note: ccm outreached to patient. Patient states she is doing well at this time. Patient states she is eating and drinking well. Pt states no questions or concerns. Ccm discussed that I would outreach next week. Pt agreeable.     Offered patient enrollment in the Remote Patient Monitoring (RPM) program for in-home monitoring: Yes, patient enrolled; current status is activated and monitoring.     Assessments Completed:   No changes since last call    Medications Reviewed:   Patient denies any changes with medications and reports taking all medications as prescribed.    Advance Care Planning:   Not reviewed during this call     Care Planning:   Education Documentation  No documentation found.  Education Comments  No comments found.     ,    Goals Addressed                   This Visit's Progress     Conditions and Symptoms   On track     I will schedule office visits, as directed by my provider.  I will keep my appointment or reschedule if I have to cancel.  I will notify my provider of any barriers to my plan of care.  I will notify my provider of any symptoms that indicate a worsening of my condition.    Barriers: overwhelmed by complexity of regimen  Plan for overcoming my barriers: weekly calls and rpm  Confidence: 8/10  Anticipated Goal Completion Date: 25                 PCP/Specialist follow up:   Future Appointments         Provider Specialty Dept Phone    2025 11:00 AM Nancy Easton DO Internal Medicine

## 2025-04-24 ENCOUNTER — CARE COORDINATION (OUTPATIENT)
Dept: CARE COORDINATION | Facility: CLINIC | Age: 85
End: 2025-04-24

## 2025-04-30 ENCOUNTER — CARE COORDINATION (OUTPATIENT)
Dept: CARE COORDINATION | Facility: CLINIC | Age: 85
End: 2025-04-30

## 2025-04-30 NOTE — CARE COORDINATION
4/30/2025 10:10 AM  *  Unable to Reach Date/Time:  4/30/2025 10:10 AM  LPN attempted to reach patient by telephone regarding yellow alert in remote patient monitoring program. BP / BP HR not updated x 3 days. Unable to reach pt and unable to leave message. Recording stated, \"called party temporarily unavailable\".

## 2025-05-01 ENCOUNTER — CARE COORDINATION (OUTPATIENT)
Dept: CARE COORDINATION | Facility: CLINIC | Age: 85
End: 2025-05-01

## 2025-05-01 NOTE — CARE COORDINATION
5/1/2025 12:40 PM  *  Unable to Reach Date/Time:  5/1/2025 12:40 PM  LPN attempted to reach patient by telephone regarding yellow alert in remote patient monitoring program. Metrics have not been updated x 4 days. Unable to reach pt and unable to leave message. Voicemail not set up. LPN attempted to contact patients emergency contact, Anna Holt. Unable to reach and unable to leave message. Voicemail full. Will escalate to ACM stating, \"Chloe Ta is currently enrolled in Remote Patient Monitoring (RPM) and has not entered vitals in 4 days. The RPM team has been unable to reach your patient to discuss adherence in RPM.  Please attempt to outreach your patient and discuss adherence with RPM. If patient is no longer interested in participating please send a dis-enrollment request to the RPM pool for processing.   Thank You.\"

## 2025-05-01 NOTE — CARE COORDINATION
Ambulatory Care Coordination Note     2025 2:28 PM     Patient Current Location:  [place]     ACM contacted the patient by telephone. Verified name and  with patient as identifiers.         ACM: Salud Sousa RN     Challenges to be reviewed by the provider   Additional needs identified to be addressed with provider No  none               Method of communication with provider: none.    Utilization: Patient has not had any utilization since our last call.     Care Summary Note: contacted by rpm specialist that patient has updated vitals. Ccm outreached to patient and she states that the screen has been staying black so she has been writing down her blood pressures. Her blood pressure yesterday was 141/62, pulse 69. Ccm left message with staff notifying of issue and they will reach out. Patient states no questions or concerns. Ccm discussed that I would outreach next week. Patient agreeable.     Offered patient enrollment in the Remote Patient Monitoring (RPM) program for in-home monitoring: Yes, patient enrolled; current status is activated and monitoring.     Assessments Completed:   No changes since last call    Medications Reviewed:   Patient denies any changes with medications and reports taking all medications as prescribed.    Advance Care Planning:   Not reviewed during this call     Care Planning:   Education Documentation  No documentation found.  Education Comments  No comments found.     ,    Goals Addressed                   This Visit's Progress     Conditions and Symptoms   On track     I will schedule office visits, as directed by my provider.  I will keep my appointment or reschedule if I have to cancel.  I will notify my provider of any barriers to my plan of care.  I will notify my provider of any symptoms that indicate a worsening of my condition.    Barriers: overwhelmed by complexity of regimen  Plan for overcoming my barriers: weekly calls and rpm  Confidence: 8/10  Anticipated

## 2025-05-05 ENCOUNTER — CARE COORDINATION (OUTPATIENT)
Dept: CARE COORDINATION | Facility: CLINIC | Age: 85
End: 2025-05-05

## 2025-05-05 NOTE — CARE COORDINATION
Ambulatory Care Coordination Note     2025 11:03 AM     Patient Current Location:  [place]     ACM contacted the patient by telephone. Verified name and  with patient as identifiers.         ACM: Salud Sousa RN     Challenges to be reviewed by the provider   Additional needs identified to be addressed with provider No  none               Method of communication with provider: none.    Utilization: Patient has not had any utilization since our last call.     Care Summary Note: ccm outreached to patient. Patient states she is doing well. Patient states someone is coming out this week to check rpm equipment. Patient states she is eating and drinking well. Patient states no questions or concerns. [facility] discussed that I would outreach next week. Patient agreeable.     Offered patient enrollment in the Remote Patient Monitoring (RPM) program for in-home monitoring: Yes, patient enrolled; current status is activated and monitoring.     Assessments Completed:   No changes since last call    Medications Reviewed:   Patient denies any changes with medications and reports taking all medications as prescribed.    Advance Care Planning:   Not reviewed during this call     Care Planning:   Education Documentation  No documentation found.  Education Comments  No comments found.     ,    Goals Addressed                   This Visit's Progress     Conditions and Symptoms   On track     I will schedule office visits, as directed by my provider.  I will keep my appointment or reschedule if I have to cancel.  I will notify my provider of any barriers to my plan of care.  I will notify my provider of any symptoms that indicate a worsening of my condition.    Barriers: overwhelmed by complexity of regimen  Plan for overcoming my barriers: weekly calls and rpm  Confidence: 8/10  Anticipated Goal Completion Date: 25                 PCP/Specialist follow up:   Future Appointments         Provider Specialty Dept Phone

## 2025-05-06 ENCOUNTER — CARE COORDINATION (OUTPATIENT)
Dept: CARE COORDINATION | Facility: CLINIC | Age: 85
End: 2025-05-06

## 2025-05-06 NOTE — CARE COORDINATION
Health  Outreach for RPM    Called the patient and scheduled an in home visit to trouble shoot RPM tablet. Scheduled for Friday May 9 @ 1430

## 2025-05-09 ENCOUNTER — CARE COORDINATION (OUTPATIENT)
Dept: CARE COORDINATION | Facility: CLINIC | Age: 85
End: 2025-05-09

## 2025-05-09 DIAGNOSIS — I10 ESSENTIAL (PRIMARY) HYPERTENSION: Primary | ICD-10-CM

## 2025-05-09 NOTE — PROGRESS NOTES
Remote Patient Order Discontinued    Received request from Lucy Tobin RN   to discontinue order for remote patient monitoring of HTN and order completed.

## 2025-05-12 ENCOUNTER — CARE COORDINATION (OUTPATIENT)
Dept: CARE COORDINATION | Facility: CLINIC | Age: 85
End: 2025-05-12

## 2025-05-12 ENCOUNTER — CARE COORDINATION (OUTPATIENT)
Facility: CLINIC | Age: 85
End: 2025-05-12

## 2025-05-12 NOTE — CARE COORDINATION
Chloe Woody Ta  5/12/25    Care Coordination  placed call to  Health    to arrange RPM kit  through [employer].     CCSS spoke to  Health   reviewed with  Health   how to pack equipment in original packing.  Health   aware [employer] will  equipment in 2-4 days.   All questions and concerns answered.

## 2025-05-12 NOTE — CARE COORDINATION
Ambulatory Care Coordination Note     2025 11:17 AM     Patient Current Location:  [place]     ACM contacted the patient by telephone. Verified name and  with patient as identifiers.         ACM: Salud Sousa RN     Challenges to be reviewed by the provider   Additional needs identified to be addressed with provider No  none               Method of communication with provider: none.    Utilization: Patient has not had any utilization since our last call.     Care Summary Note: ccm outreached to patient. Patient states she is doing well at this time. Patient states she is eating and drinking well. Patient states no questions or concerns. [facility] discussed that I would outreach next week. Patient agreeable.          Assessments Completed:   No changes since last call    Medications Reviewed:   Patient denies any changes with medications and reports taking all medications as prescribed.    Advance Care Planning:   Not reviewed during this call     Care Planning:   Education Documentation  No documentation found.  Education Comments  No comments found.     ,    Goals Addressed                   This Visit's Progress     Conditions and Symptoms   On track     I will schedule office visits, as directed by my provider.  I will keep my appointment or reschedule if I have to cancel.  I will notify my provider of any barriers to my plan of care.  I will notify my provider of any symptoms that indicate a worsening of my condition.    Barriers: overwhelmed by complexity of regimen  Plan for overcoming my barriers: weekly calls and rpm  Confidence: 8/10  Anticipated Goal Completion Date: 25                 PCP/Specialist follow up:   Future Appointments         Provider Specialty Dept Phone    2025 11:00 AM Nancy Easton DO Internal Medicine [phone]            Follow Up:   Plan for next AC outreach in approximately 1 week to complete:  - goal progression  - education .   Patient  is

## 2025-05-12 NOTE — CARE COORDINATION
Health  Outreach for RPM    Arrived at the patients home and obtained verbal consent for home visit. Purpose of the visit was to diagnose issues with RPM tablet not working. During the course of evaluation for the issue patient asks how much longer she would have to monitor her BP, questioning the need. Patient advised that the program was to monitor BP. Patient stated that she was ready to end the program. ACM Manager Lucy Tobin contacted and advised of patients desire to discontinue RPM. RPM discontinued and equipment gathered and boxed for return. RPM CM and RPM SS notified of patients decision. Visit ended.

## 2025-05-19 ENCOUNTER — CARE COORDINATION (OUTPATIENT)
Dept: CARE COORDINATION | Facility: CLINIC | Age: 85
End: 2025-05-19

## 2025-05-19 NOTE — CARE COORDINATION
Ambulatory Care Coordination Note     2025 11:09 AM     Patient Current Location:  [place]     ACM contacted the patient by telephone. Verified name and  with patient as identifiers.         ACM: Salud Sousa RN     Challenges to be reviewed by the provider   Additional needs identified to be addressed with provider No  none               Method of communication with provider: none.    Utilization: Patient has not had any utilization since our last call.     Care Summary Note: ccm outreached to patient. Patient states she is doing well. Patient states no questions or concerns. [facility] discussed that I would outreach next week. Patient agreeable.          Assessments Completed:   No changes since last call    Medications Reviewed:   Patient denies any changes with medications and reports taking all medications as prescribed.    Advance Care Planning:   Not reviewed during this call     Care Planning:   Education Documentation  No documentation found.  Education Comments  No comments found.     ,    Goals Addressed                   This Visit's Progress     Conditions and Symptoms   On track     I will schedule office visits, as directed by my provider.  I will keep my appointment or reschedule if I have to cancel.  I will notify my provider of any barriers to my plan of care.  I will notify my provider of any symptoms that indicate a worsening of my condition.    Barriers: overwhelmed by complexity of regimen  Plan for overcoming my barriers: weekly calls and rpm  Confidence: 8/10  Anticipated Goal Completion Date: 25                 PCP/Specialist follow up:   Future Appointments         Provider Specialty Dept Phone    2025 11:00 AM Nancy Easton DO Internal Medicine [phone]            Follow Up:   Plan for next AC outreach in approximately 1 week to complete:  - goal progression  - education .   Patient  is agreeable to this plan.

## 2025-06-02 ENCOUNTER — CARE COORDINATION (OUTPATIENT)
Dept: CARE COORDINATION | Facility: CLINIC | Age: 85
End: 2025-06-02

## 2025-06-02 NOTE — CARE COORDINATION
Ambulatory Care Coordination Note     2025 3:05 PM     Patient Current Location:  [place]     ACM contacted the patient by telephone. Verified name and  with patient as identifiers.         ACM: Salud Sousa RN     Challenges to be reviewed by the provider   Additional needs identified to be addressed with provider No  none               Method of communication with provider: none.    Utilization: Patient has not had any utilization since our last call.     Care Summary Note: ccm outreached to patient. Patient states she is doing well at this time. Patient states she is getting around well. Patient states no questions or concerns. [facility] discussed that I would outreach next week. Patient agreeable.          Assessments Completed:   No changes since last call    Medications Reviewed:   Patient denies any changes with medications and reports taking all medications as prescribed.    Advance Care Planning:   Not reviewed during this call     Care Planning:   Education Documentation  No documentation found.  Education Comments  No comments found.     ,    Goals Addressed                   This Visit's Progress     Conditions and Symptoms   On track     I will schedule office visits, as directed by my provider.  I will keep my appointment or reschedule if I have to cancel.  I will notify my provider of any barriers to my plan of care.  I will notify my provider of any symptoms that indicate a worsening of my condition.    Barriers: overwhelmed by complexity of regimen  Plan for overcoming my barriers: weekly calls and rpm  Confidence: 8/10  Anticipated Goal Completion Date: 25                 PCP/Specialist follow up:   Future Appointments         Provider Specialty Dept Phone    2025 11:00 AM Nancy Easton DO Internal Medicine [phone]            Follow Up:   Plan for next AC outreach in approximately 1 week to complete:  - goal progression  - education .   Patient  is agreeable to

## 2025-06-09 ENCOUNTER — CARE COORDINATION (OUTPATIENT)
Dept: CARE COORDINATION | Facility: CLINIC | Age: 85
End: 2025-06-09

## 2025-06-09 NOTE — CARE COORDINATION
Ambulatory Care Coordination Note     2025 1:48 PM     Patient Current Location:  [place]     ACM contacted the patient by telephone. Verified name and  with patient as identifiers.     Patient graduated from the High Risk Care Management program on 2025.  Patient verbalizes confidence in the ability to self-manage at this time..  Care management goals have been completed. No further Ambulatory Care Manager follow up scheduled.

## 2025-07-01 ENCOUNTER — OFFICE VISIT (OUTPATIENT)
Dept: ORTHOPEDIC SURGERY | Age: 85
End: 2025-07-01
Payer: MEDICARE

## 2025-07-01 DIAGNOSIS — M70.62 GREATER TROCHANTERIC BURSITIS OF LEFT HIP: ICD-10-CM

## 2025-07-01 DIAGNOSIS — Z96.652 HISTORY OF TOTAL KNEE ARTHROPLASTY, LEFT: Primary | ICD-10-CM

## 2025-07-01 DIAGNOSIS — Z96.651 HISTORY OF TOTAL KNEE ARTHROPLASTY, RIGHT: ICD-10-CM

## 2025-07-01 DIAGNOSIS — M54.50 LOW BACK PAIN AT MULTIPLE SITES: ICD-10-CM

## 2025-07-01 PROCEDURE — 1090F PRES/ABSN URINE INCON ASSESS: CPT | Performed by: PHYSICIAN ASSISTANT

## 2025-07-01 PROCEDURE — G8420 CALC BMI NORM PARAMETERS: HCPCS | Performed by: PHYSICIAN ASSISTANT

## 2025-07-01 PROCEDURE — 1123F ACP DISCUSS/DSCN MKR DOCD: CPT | Performed by: PHYSICIAN ASSISTANT

## 2025-07-01 PROCEDURE — G8400 PT W/DXA NO RESULTS DOC: HCPCS | Performed by: PHYSICIAN ASSISTANT

## 2025-07-01 PROCEDURE — 99214 OFFICE O/P EST MOD 30 MIN: CPT | Performed by: PHYSICIAN ASSISTANT

## 2025-07-01 PROCEDURE — 1036F TOBACCO NON-USER: CPT | Performed by: PHYSICIAN ASSISTANT

## 2025-07-01 PROCEDURE — G8428 CUR MEDS NOT DOCUMENT: HCPCS | Performed by: PHYSICIAN ASSISTANT

## 2025-07-01 PROCEDURE — 20610 DRAIN/INJ JOINT/BURSA W/O US: CPT | Performed by: PHYSICIAN ASSISTANT

## 2025-07-01 RX ORDER — METHYLPREDNISOLONE ACETATE 40 MG/ML
40 INJECTION, SUSPENSION INTRA-ARTICULAR; INTRALESIONAL; INTRAMUSCULAR; SOFT TISSUE ONCE
Status: COMPLETED | OUTPATIENT
Start: 2025-07-01 | End: 2025-07-01

## 2025-07-01 RX ADMIN — METHYLPREDNISOLONE ACETATE 40 MG: 40 INJECTION, SUSPENSION INTRA-ARTICULAR; INTRALESIONAL; INTRAMUSCULAR; SOFT TISSUE at 14:17

## 2025-07-01 NOTE — PROGRESS NOTES
Name: Chloe Ta  YOB: 1940  Gender: female  MRN: [id number]      Chief complaint:  LEFT KNEE PAIN    History of Present Illness:     Chloe Ta is a 84 y.o. female  Patient returns today for their bilateral total knee performed 17 years ago by Dr. Roca.  Over the past few months they have noticed increased pain  over primarily the left knee.  They deny any fall or injury that may have caused this pain.  She does note that she feels up a lot of inclines and declines going down to her barn to feed her goats, and this does at times increase her pain and symptoms.  They have tried ice, activity modification, Tylenol, anti-inflammatories and have not helped with her symptoms.  They deny any fever, chills or malaise today.  At this time they are walking with a cane.  She does have a history of chronic low back pain including 2 previous lumbar spine surgeries by Dr. Lazcano, most recently in 2020. They have tried ice, activity modification, Tylenol, anti-inflammatories and have not helped with her symptoms.     Of note she is coming by her daughter today.  During her last visit she did receive a bursa injection, which she did not remember the bursa injection nor her visit with me in December.  There is an underlying dementia that may be progressing for the patient.    Past Medical History:    Allergies:  Allergies   Allergen Reactions    Adhesive Tape Rash       Medications:  Current Outpatient Medications   Medication Sig    diclofenac sodium (VOLTAREN) 1 % GEL Apply 4g to the affected area 4 times daily.    lisinopril (PRINIVIL;ZESTRIL) 5 MG tablet Take 1 tablet by mouth daily    lovastatin (MEVACOR) 10 MG tablet Take 1 tablet by mouth nightly    metoprolol tartrate (LOPRESSOR) 50 MG tablet Take 1 tablet by mouth 2 times daily    vitamin D (CHOLECALCIFEROL) 50 MCG (2000 UT) TABS tablet Take 2 tablets by mouth daily    cyanocobalamin 1000 MCG tablet Take 1 tablet by mouth daily    Aspirin 81 MG

## (undated) DEVICE — DRAPE XR C ARM 41X74IN LF --

## (undated) DEVICE — POSTERIOR LAMI VANPLT-LUCAS: Brand: MEDLINE INDUSTRIES, INC.

## (undated) DEVICE — SUTURE VCRL SZ 2-0 L27IN ABSRB UD L36MM CP-1 1/2 CIR REV J266H

## (undated) DEVICE — CORD,CAUTERY,BIPOLAR,STERILE: Brand: MEDLINE

## (undated) DEVICE — SOLUTION IV 1000ML 0.9% SOD CHL

## (undated) DEVICE — GARMENT,MEDLINE,DVT,INT,CALF,MED, GEN2: Brand: MEDLINE

## (undated) DEVICE — JACKSON TABLE POSITIONER KIT: Brand: MEDLINE INDUSTRIES, INC.

## (undated) DEVICE — SUTURE VCRL SZ 1 L27IN ABSRB UD L36MM CP-1 1/2 CIR REV CUT J268H

## (undated) DEVICE — REM POLYHESIVE ADULT PATIENT RETURN ELECTRODE: Brand: VALLEYLAB

## (undated) DEVICE — INTENDED FOR TISSUE SEPARATION, AND OTHER PROCEDURES THAT REQUIRE A SHARP SURGICAL BLADE TO PUNCTURE OR CUT.: Brand: BARD-PARKER SAFETY BLADES SIZE 15, STERILE

## (undated) DEVICE — NEEDLE HYPO 21GA L1.5IN INTRAMUSCULAR S STL LATCH BVL UP

## (undated) DEVICE — SYR 10ML LUER LOK 1/5ML GRAD --

## (undated) DEVICE — 3M™ TEGADERM™ TRANSPARENT FILM DRESSING FRAME STYLE, 1626W, 4 IN X 4-3/4 IN (10 CM X 12 CM), 50/CT 4CT/CASE: Brand: 3M™ TEGADERM™

## (undated) DEVICE — 2000CC GUARDIAN II: Brand: GUARDIAN

## (undated) DEVICE — 5.0MM PRECISION ROUND

## (undated) DEVICE — AGENT HEMSTAT 8ML FLX TIP MTRX + DISP SURGIFLO

## (undated) DEVICE — 3M™ STERI-DRAPE™ INSTRUMENT POUCH 1018: Brand: STERI-DRAPE™

## (undated) DEVICE — (D)PREP SKN CHLRAPRP APPL 26ML -- CONVERT TO ITEM 371833

## (undated) DEVICE — STRIP SKIN CLSR W1XL5IN NYL REINF CURAD

## (undated) DEVICE — 3M™ IOBAN™ 2 ANTIMICROBIAL INCISE DRAPE 6650EZ: Brand: IOBAN™ 2

## (undated) DEVICE — DRAPE SHT 3 QTR PROXIMA 53X77 --

## (undated) DEVICE — PACKING 8004003 NEURAY 200PK 25X25MM: Brand: NEURAY ®

## (undated) DEVICE — CARDINAL HEALTH FLEXIBLE LIGHT HANDLE COVER: Brand: CARDINAL HEALTH

## (undated) DEVICE — INTENDED FOR TISSUE SEPARATION, AND OTHER PROCEDURES THAT REQUIRE A SHARP SURGICAL BLADE TO PUNCTURE OR CUT.: Brand: BARD-PARKER SAFETY BLADES SIZE 10, STERILE

## (undated) DEVICE — MASTISOL ADHESIVE LIQ 2/3ML

## (undated) DEVICE — SUTURE VCRL + SZ 3-0 L18IN ABSRB UD PS-2 3/8 CIR REV CUT VCP497H

## (undated) DEVICE — 1010 S-DRAPE TOWEL DRAPE 10/BX: Brand: STERI-DRAPE™

## (undated) DEVICE — SPONGE LAP 18X18IN STRL -- 5/PK

## (undated) DEVICE — BONE WAX WHITE: Brand: BONE WAX WHITE